# Patient Record
Sex: FEMALE | Race: WHITE | NOT HISPANIC OR LATINO | Employment: OTHER | URBAN - METROPOLITAN AREA
[De-identification: names, ages, dates, MRNs, and addresses within clinical notes are randomized per-mention and may not be internally consistent; named-entity substitution may affect disease eponyms.]

---

## 2019-12-22 ENCOUNTER — APPOINTMENT (OUTPATIENT)
Dept: GENERAL RADIOLOGY | Facility: HOSPITAL | Age: 79
End: 2019-12-22

## 2019-12-22 ENCOUNTER — HOSPITAL ENCOUNTER (OUTPATIENT)
Facility: HOSPITAL | Age: 79
Discharge: HOME HEALTH | End: 2019-12-26
Attending: EMERGENCY MEDICINE | Admitting: EMERGENCY MEDICINE

## 2019-12-22 DIAGNOSIS — S32.110A: Primary | ICD-10-CM
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
## 2019-12-22 PROBLEM — G30.1 DEMENTIA OF THE ALZHEIMER'S TYPE WITH LATE ONSET WITHOUT BEHAVIORAL DISTURBANCE (HCC): Chronic | Status: ACTIVE | Noted: 2019-12-22

## 2019-12-22 PROBLEM — F32.A DEPRESSION: Chronic | Status: ACTIVE | Noted: 2019-12-22

## 2019-12-22 PROBLEM — M19.90 OSTEOARTHRITIS: Chronic | Status: ACTIVE | Noted: 2019-12-22

## 2019-12-22 PROBLEM — F02.80 DEMENTIA OF THE ALZHEIMER'S TYPE WITH LATE ONSET WITHOUT BEHAVIORAL DISTURBANCE (HCC): Chronic | Status: ACTIVE | Noted: 2019-12-22

## 2019-12-22 PROBLEM — E78.5 HYPERLIPIDEMIA: Status: ACTIVE | Noted: 2019-12-22

## 2019-12-22 PROBLEM — E78.5 HYPERLIPIDEMIA: Chronic | Status: ACTIVE | Noted: 2019-12-22

## 2019-12-22 LAB
ANION GAP SERPL CALCULATED.3IONS-SCNC: 13 MMOL/L (ref 5–15)
BASOPHILS # BLD AUTO: 0 10*3/MM3 (ref 0–0.2)
BASOPHILS NFR BLD AUTO: 0.3 % (ref 0–1.5)
BUN BLD-MCNC: 21 MG/DL (ref 8–23)
BUN/CREAT SERPL: 22.6 (ref 7–25)
CALCIUM SPEC-SCNC: 9.5 MG/DL (ref 8.6–10.5)
CHLORIDE SERPL-SCNC: 104 MMOL/L (ref 98–107)
CO2 SERPL-SCNC: 24 MMOL/L (ref 22–29)
CREAT BLD-MCNC: 0.93 MG/DL (ref 0.57–1)
DEPRECATED RDW RBC AUTO: 46.4 FL (ref 37–54)
EOSINOPHIL # BLD AUTO: 0.1 10*3/MM3 (ref 0–0.4)
EOSINOPHIL NFR BLD AUTO: 1.2 % (ref 0.3–6.2)
ERYTHROCYTE [DISTWIDTH] IN BLOOD BY AUTOMATED COUNT: 14.6 % (ref 12.3–15.4)
GFR SERPL CREATININE-BSD FRML MDRD: 58 ML/MIN/1.73
GLUCOSE BLD-MCNC: 99 MG/DL (ref 65–99)
HCT VFR BLD AUTO: 39.7 % (ref 34–46.6)
HGB BLD-MCNC: 13.2 G/DL (ref 12–15.9)
LYMPHOCYTES # BLD AUTO: 1.1 10*3/MM3 (ref 0.7–3.1)
LYMPHOCYTES NFR BLD AUTO: 10.9 % (ref 19.6–45.3)
MCH RBC QN AUTO: 30.1 PG (ref 26.6–33)
MCHC RBC AUTO-ENTMCNC: 33.4 G/DL (ref 31.5–35.7)
MCV RBC AUTO: 90.2 FL (ref 79–97)
MONOCYTES # BLD AUTO: 0.5 10*3/MM3 (ref 0.1–0.9)
MONOCYTES NFR BLD AUTO: 4.7 % (ref 5–12)
NEUTROPHILS # BLD AUTO: 8.2 10*3/MM3 (ref 1.7–7)
NEUTROPHILS NFR BLD AUTO: 82.9 % (ref 42.7–76)
NRBC BLD AUTO-RTO: 0 /100 WBC (ref 0–0.2)
PLATELET # BLD AUTO: 165 10*3/MM3 (ref 140–450)
PMV BLD AUTO: 8.9 FL (ref 6–12)
POTASSIUM BLD-SCNC: 3.5 MMOL/L (ref 3.5–5.2)
RBC # BLD AUTO: 4.39 10*6/MM3 (ref 3.77–5.28)
SODIUM BLD-SCNC: 141 MMOL/L (ref 136–145)
WBC NRBC COR # BLD: 9.9 10*3/MM3 (ref 3.4–10.8)

## 2019-12-22 PROCEDURE — 25010000002 ONDANSETRON PER 1 MG: Performed by: NURSE PRACTITIONER

## 2019-12-22 PROCEDURE — 72170 X-RAY EXAM OF PELVIS: CPT

## 2019-12-22 PROCEDURE — 80048 BASIC METABOLIC PNL TOTAL CA: CPT | Performed by: EMERGENCY MEDICINE

## 2019-12-22 PROCEDURE — 25010000002 KETOROLAC TROMETHAMINE PER 15 MG: Performed by: NURSE PRACTITIONER

## 2019-12-22 PROCEDURE — G0378 HOSPITAL OBSERVATION PER HR: HCPCS

## 2019-12-22 PROCEDURE — 96375 TX/PRO/DX INJ NEW DRUG ADDON: CPT

## 2019-12-22 PROCEDURE — 85025 COMPLETE CBC W/AUTO DIFF WBC: CPT | Performed by: EMERGENCY MEDICINE

## 2019-12-22 PROCEDURE — 96374 THER/PROPH/DIAG INJ IV PUSH: CPT

## 2019-12-22 PROCEDURE — 99283 EMERGENCY DEPT VISIT LOW MDM: CPT

## 2019-12-22 PROCEDURE — 25010000002 HYDRALAZINE PER 20 MG: Performed by: NURSE PRACTITIONER

## 2019-12-22 PROCEDURE — 99219 PR INITIAL OBSERVATION CARE/DAY 50 MINUTES: CPT | Performed by: NURSE PRACTITIONER

## 2019-12-22 RX ORDER — ACETAMINOPHEN 325 MG/1
650 TABLET ORAL EVERY 4 HOURS PRN
Status: DISCONTINUED | OUTPATIENT
Start: 2019-12-22 | End: 2019-12-26 | Stop reason: HOSPADM

## 2019-12-22 RX ORDER — ONDANSETRON 4 MG/1
4 TABLET, FILM COATED ORAL EVERY 6 HOURS PRN
Status: DISCONTINUED | OUTPATIENT
Start: 2019-12-22 | End: 2019-12-26 | Stop reason: HOSPADM

## 2019-12-22 RX ORDER — ALUMINA, MAGNESIA, AND SIMETHICONE 2400; 2400; 240 MG/30ML; MG/30ML; MG/30ML
15 SUSPENSION ORAL EVERY 6 HOURS PRN
Status: DISCONTINUED | OUTPATIENT
Start: 2019-12-22 | End: 2019-12-26 | Stop reason: HOSPADM

## 2019-12-22 RX ORDER — PRAVASTATIN SODIUM 40 MG
80 TABLET ORAL DAILY
COMMUNITY
End: 2019-12-26 | Stop reason: HOSPADM

## 2019-12-22 RX ORDER — MELOXICAM 15 MG/1
15 TABLET ORAL DAILY
COMMUNITY

## 2019-12-22 RX ORDER — CHOLECALCIFEROL (VITAMIN D3) 125 MCG
5 CAPSULE ORAL NIGHTLY PRN
Status: DISCONTINUED | OUTPATIENT
Start: 2019-12-22 | End: 2019-12-26 | Stop reason: HOSPADM

## 2019-12-22 RX ORDER — KETOROLAC TROMETHAMINE 15 MG/ML
15 INJECTION, SOLUTION INTRAMUSCULAR; INTRAVENOUS ONCE
Status: COMPLETED | OUTPATIENT
Start: 2019-12-22 | End: 2019-12-22

## 2019-12-22 RX ORDER — ATORVASTATIN CALCIUM 20 MG/1
20 TABLET, FILM COATED ORAL DAILY
Status: DISCONTINUED | OUTPATIENT
Start: 2019-12-23 | End: 2019-12-26 | Stop reason: HOSPADM

## 2019-12-22 RX ORDER — ACETAMINOPHEN 650 MG/1
650 SUPPOSITORY RECTAL EVERY 4 HOURS PRN
Status: DISCONTINUED | OUTPATIENT
Start: 2019-12-22 | End: 2019-12-26 | Stop reason: HOSPADM

## 2019-12-22 RX ORDER — CITALOPRAM 20 MG/1
20 TABLET ORAL DAILY
COMMUNITY

## 2019-12-22 RX ORDER — AMLODIPINE BESYLATE 2.5 MG/1
2.5 TABLET ORAL DAILY
COMMUNITY
End: 2020-05-06 | Stop reason: HOSPADM

## 2019-12-22 RX ORDER — SODIUM CHLORIDE 0.9 % (FLUSH) 0.9 %
10 SYRINGE (ML) INJECTION AS NEEDED
Status: DISCONTINUED | OUTPATIENT
Start: 2019-12-22 | End: 2019-12-26 | Stop reason: HOSPADM

## 2019-12-22 RX ORDER — DONEPEZIL HYDROCHLORIDE 5 MG/1
10 TABLET, FILM COATED ORAL NIGHTLY
Status: DISCONTINUED | OUTPATIENT
Start: 2019-12-22 | End: 2019-12-26 | Stop reason: HOSPADM

## 2019-12-22 RX ORDER — CITALOPRAM 20 MG/1
20 TABLET ORAL DAILY
Status: DISCONTINUED | OUTPATIENT
Start: 2019-12-22 | End: 2019-12-26 | Stop reason: HOSPADM

## 2019-12-22 RX ORDER — SODIUM CHLORIDE 0.9 % (FLUSH) 0.9 %
10 SYRINGE (ML) INJECTION EVERY 12 HOURS SCHEDULED
Status: DISCONTINUED | OUTPATIENT
Start: 2019-12-22 | End: 2019-12-26 | Stop reason: HOSPADM

## 2019-12-22 RX ORDER — HYDRALAZINE HYDROCHLORIDE 20 MG/ML
10 INJECTION INTRAMUSCULAR; INTRAVENOUS ONCE AS NEEDED
Status: COMPLETED | OUTPATIENT
Start: 2019-12-22 | End: 2019-12-22

## 2019-12-22 RX ORDER — ONDANSETRON 2 MG/ML
4 INJECTION INTRAMUSCULAR; INTRAVENOUS EVERY 6 HOURS PRN
Status: DISCONTINUED | OUTPATIENT
Start: 2019-12-22 | End: 2019-12-26 | Stop reason: HOSPADM

## 2019-12-22 RX ORDER — BISACODYL 5 MG/1
5 TABLET, DELAYED RELEASE ORAL DAILY PRN
Status: DISCONTINUED | OUTPATIENT
Start: 2019-12-22 | End: 2019-12-26 | Stop reason: HOSPADM

## 2019-12-22 RX ORDER — PANTOPRAZOLE SODIUM 40 MG/1
40 TABLET, DELAYED RELEASE ORAL EVERY MORNING
Status: DISCONTINUED | OUTPATIENT
Start: 2019-12-23 | End: 2019-12-26 | Stop reason: HOSPADM

## 2019-12-22 RX ORDER — BENAZEPRIL HYDROCHLORIDE 40 MG/1
40 TABLET, FILM COATED ORAL DAILY
COMMUNITY
End: 2020-05-06 | Stop reason: HOSPADM

## 2019-12-22 RX ORDER — AMLODIPINE BESYLATE 5 MG/1
2.5 TABLET ORAL DAILY
Status: DISCONTINUED | OUTPATIENT
Start: 2019-12-23 | End: 2019-12-26 | Stop reason: HOSPADM

## 2019-12-22 RX ORDER — OMEPRAZOLE 20 MG/1
20 CAPSULE, DELAYED RELEASE ORAL DAILY
COMMUNITY

## 2019-12-22 RX ORDER — LISINOPRIL 20 MG/1
40 TABLET ORAL
Status: DISCONTINUED | OUTPATIENT
Start: 2019-12-23 | End: 2019-12-26 | Stop reason: HOSPADM

## 2019-12-22 RX ORDER — ACETAMINOPHEN 160 MG/5ML
650 SOLUTION ORAL EVERY 4 HOURS PRN
Status: DISCONTINUED | OUTPATIENT
Start: 2019-12-22 | End: 2019-12-26 | Stop reason: HOSPADM

## 2019-12-22 RX ORDER — MELOXICAM 15 MG/1
15 TABLET ORAL DAILY
Status: DISCONTINUED | OUTPATIENT
Start: 2019-12-23 | End: 2019-12-26 | Stop reason: HOSPADM

## 2019-12-22 RX ORDER — DONEPEZIL HYDROCHLORIDE 10 MG/1
10 TABLET, FILM COATED ORAL NIGHTLY
COMMUNITY

## 2019-12-22 RX ADMIN — HYDRALAZINE HYDROCHLORIDE 10 MG: 20 INJECTION INTRAMUSCULAR; INTRAVENOUS at 23:46

## 2019-12-22 RX ADMIN — ACETAMINOPHEN 325 MG: 325 TABLET ORAL at 21:40

## 2019-12-22 RX ADMIN — KETOROLAC TROMETHAMINE 15 MG: 15 INJECTION, SOLUTION INTRAMUSCULAR; INTRAVENOUS at 22:23

## 2019-12-22 RX ADMIN — Medication 10 ML: at 21:56

## 2019-12-22 RX ADMIN — ONDANSETRON 4 MG: 2 INJECTION INTRAMUSCULAR; INTRAVENOUS at 22:16

## 2019-12-22 NOTE — ED PROVIDER NOTES
Subjective   Patient is a 79-year-old female with severe dementia who apparently had wandered from her  while they were at a hotel.  The patient apparently had walked away and fallen per security when they found her.   states she has been unable to walk and bear weight.  The patient is unable to offer complaints due to her severe dementia.          Review of Systems  According to the  negative for recent fever cough congestion vomiting diarrhea or other complaint of injury.  Further review of systems are unable to be obtained due to the patient's dementia.  No past medical history on file.    No Known Allergies    No past surgical history on file.    No family history on file.    Social History     Socioeconomic History   • Marital status:      Spouse name: Not on file   • Number of children: Not on file   • Years of education: Not on file   • Highest education level: Not on file           Objective   Physical Exam  HEENT exam has no point tenderness.  TMs are clear.  Oropharynx clear.  Neck has no test.  Lungs are clear.  Chest nontender.  M soft nontender.  Back has no spinous tenderness.  Extremity exam shows the patient have no point tenderness.  She does have mild pain on palpation of motion of both legs.  Although is difficult to pinpoint position.  Attempts were made to stand the patient however she was unable to bear weight due to what appeared to be pain but very difficult to evaluate.  Procedures           ED Course      Xr Pelvis 1 Or 2 View    Result Date: 12/22/2019  1. Subtle buckling of the right S2 sacral arcuate line which may represent a nondisplaced fracture. Correlate clinically with history of trauma to the sacrum. 2. No evidence of hip fracture or dislocation.  Electronically Signed By-Denys Reid On:12/22/2019 5:29 PM This report was finalized on [id number] by  Denys Reid, .                    No data recorded                        MDM  Number of  Diagnoses or Management Options  Diagnosis management comments: Patient has findings consistent with a nondisplaced sacral fracture.  There is no evidence of other injury at this time.  Patient will be brought in the hospital for pain control and further evaluation for possible rehab as well.    Risk of Complications, Morbidity, and/or Mortality  Presenting problems: high  Diagnostic procedures: high  Management options: high    Patient Progress  Patient progress: stable      Final diagnoses:   Nondisplaced zone i fracture of sacrum, initial encounter for closed fracture (CMS/[facility])   Fall, initial encounter              Bradford Niño MD  12/22/19 6932

## 2019-12-23 ENCOUNTER — APPOINTMENT (OUTPATIENT)
Dept: GENERAL RADIOLOGY | Facility: HOSPITAL | Age: 79
End: 2019-12-23

## 2019-12-23 LAB
ANION GAP SERPL CALCULATED.3IONS-SCNC: 15 MMOL/L (ref 5–15)
BASOPHILS # BLD AUTO: 0 10*3/MM3 (ref 0–0.2)
BASOPHILS NFR BLD AUTO: 0.2 % (ref 0–1.5)
BUN BLD-MCNC: 18 MG/DL (ref 8–23)
BUN/CREAT SERPL: 22.8 (ref 7–25)
CALCIUM SPEC-SCNC: 9.1 MG/DL (ref 8.6–10.5)
CHLORIDE SERPL-SCNC: 108 MMOL/L (ref 98–107)
CO2 SERPL-SCNC: 18 MMOL/L (ref 22–29)
CREAT BLD-MCNC: 0.79 MG/DL (ref 0.57–1)
DEPRECATED RDW RBC AUTO: 46.8 FL (ref 37–54)
EOSINOPHIL # BLD AUTO: 0.1 10*3/MM3 (ref 0–0.4)
EOSINOPHIL NFR BLD AUTO: 0.9 % (ref 0.3–6.2)
ERYTHROCYTE [DISTWIDTH] IN BLOOD BY AUTOMATED COUNT: 14.9 % (ref 12.3–15.4)
GFR SERPL CREATININE-BSD FRML MDRD: 70 ML/MIN/1.73
GLUCOSE BLD-MCNC: 119 MG/DL (ref 65–99)
HCT VFR BLD AUTO: 40 % (ref 34–46.6)
HGB BLD-MCNC: 13.3 G/DL (ref 12–15.9)
LYMPHOCYTES # BLD AUTO: 1.2 10*3/MM3 (ref 0.7–3.1)
LYMPHOCYTES NFR BLD AUTO: 9.6 % (ref 19.6–45.3)
MCH RBC QN AUTO: 29.8 PG (ref 26.6–33)
MCHC RBC AUTO-ENTMCNC: 33.3 G/DL (ref 31.5–35.7)
MCV RBC AUTO: 89.5 FL (ref 79–97)
MONOCYTES # BLD AUTO: 0.6 10*3/MM3 (ref 0.1–0.9)
MONOCYTES NFR BLD AUTO: 4.8 % (ref 5–12)
NEUTROPHILS # BLD AUTO: 10.9 10*3/MM3 (ref 1.7–7)
NEUTROPHILS NFR BLD AUTO: 84.5 % (ref 42.7–76)
NRBC BLD AUTO-RTO: 0 /100 WBC (ref 0–0.2)
PLATELET # BLD AUTO: 176 10*3/MM3 (ref 140–450)
PMV BLD AUTO: 9.3 FL (ref 6–12)
POTASSIUM BLD-SCNC: 3 MMOL/L (ref 3.5–5.2)
RBC # BLD AUTO: 4.46 10*6/MM3 (ref 3.77–5.28)
SODIUM BLD-SCNC: 141 MMOL/L (ref 136–145)
WBC NRBC COR # BLD: 12.9 10*3/MM3 (ref 3.4–10.8)

## 2019-12-23 PROCEDURE — 85025 COMPLETE CBC W/AUTO DIFF WBC: CPT | Performed by: NURSE PRACTITIONER

## 2019-12-23 PROCEDURE — 97535 SELF CARE MNGMENT TRAINING: CPT

## 2019-12-23 PROCEDURE — G0378 HOSPITAL OBSERVATION PER HR: HCPCS

## 2019-12-23 PROCEDURE — 73590 X-RAY EXAM OF LOWER LEG: CPT

## 2019-12-23 PROCEDURE — 97530 THERAPEUTIC ACTIVITIES: CPT

## 2019-12-23 PROCEDURE — 99225 PR SBSQ OBSERVATION CARE/DAY 25 MINUTES: CPT | Performed by: HOSPITALIST

## 2019-12-23 PROCEDURE — 73630 X-RAY EXAM OF FOOT: CPT

## 2019-12-23 PROCEDURE — 80048 BASIC METABOLIC PNL TOTAL CA: CPT | Performed by: NURSE PRACTITIONER

## 2019-12-23 PROCEDURE — 71111 X-RAY EXAM RIBS/CHEST4/> VWS: CPT

## 2019-12-23 PROCEDURE — 97165 OT EVAL LOW COMPLEX 30 MIN: CPT

## 2019-12-23 PROCEDURE — 97162 PT EVAL MOD COMPLEX 30 MIN: CPT

## 2019-12-23 RX ORDER — POTASSIUM CHLORIDE 7.45 MG/ML
10 INJECTION INTRAVENOUS
Status: DISCONTINUED | OUTPATIENT
Start: 2019-12-23 | End: 2019-12-26 | Stop reason: HOSPADM

## 2019-12-23 RX ORDER — POTASSIUM CHLORIDE 20 MEQ/1
40 TABLET, EXTENDED RELEASE ORAL AS NEEDED
Status: DISCONTINUED | OUTPATIENT
Start: 2019-12-23 | End: 2019-12-26 | Stop reason: HOSPADM

## 2019-12-23 RX ORDER — POTASSIUM CHLORIDE 1.5 G/1.77G
40 POWDER, FOR SOLUTION ORAL AS NEEDED
Status: DISCONTINUED | OUTPATIENT
Start: 2019-12-23 | End: 2019-12-26 | Stop reason: HOSPADM

## 2019-12-23 RX ADMIN — MELATONIN TAB 5 MG 5 MG: 5 TAB at 20:58

## 2019-12-23 RX ADMIN — AMLODIPINE BESYLATE 2.5 MG: 5 TABLET ORAL at 09:47

## 2019-12-23 RX ADMIN — POTASSIUM CHLORIDE 40 MEQ: 1500 TABLET, EXTENDED RELEASE ORAL at 09:47

## 2019-12-23 RX ADMIN — Medication 10 ML: at 09:51

## 2019-12-23 RX ADMIN — MELOXICAM 15 MG: 15 TABLET ORAL at 09:47

## 2019-12-23 RX ADMIN — PANTOPRAZOLE SODIUM 40 MG: 40 TABLET, DELAYED RELEASE ORAL at 09:47

## 2019-12-23 RX ADMIN — DONEPEZIL HYDROCHLORIDE 10 MG: 5 TABLET, FILM COATED ORAL at 20:58

## 2019-12-23 RX ADMIN — LISINOPRIL 40 MG: 20 TABLET ORAL at 09:58

## 2019-12-23 RX ADMIN — OYSTER SHELL CALCIUM WITH VITAMIN D 2 TABLET: 500; 200 TABLET, FILM COATED ORAL at 09:47

## 2019-12-23 RX ADMIN — ATORVASTATIN CALCIUM 20 MG: 20 TABLET, FILM COATED ORAL at 09:47

## 2019-12-23 RX ADMIN — CITALOPRAM HYDROBROMIDE 20 MG: 20 TABLET ORAL at 09:47

## 2019-12-23 RX ADMIN — Medication 10 ML: at 21:00

## 2019-12-23 NOTE — NURSING NOTE
Spoke with  about the patient's family's concerns. The family stated that the patient didn't seem like she was at her baseline cognitively. The patient was struggling to tell the RN her name. The patient has also been complaining about her right foot. Dr. Todd said she would see the patient and put in orders

## 2019-12-23 NOTE — THERAPY EVALUATION
Acute Care - Occupational Therapy Initial Evaluation   Denis     Patient Name: Taylor Price  : 1940  MRN: [id number]  Today's Date: 2019             Admit Date: 2019       ICD-10-CM ICD-9-CM   1. Nondisplaced zone i fracture of sacrum, initial encounter for closed fracture (CMS/HCC) S32.110A 805.6   2. Fall, initial encounter W19.XXXA E888.9     Patient Active Problem List   Diagnosis   • Nondisplaced zone i fracture of sacrum, initial encounter for closed fracture (CMS/HCC)   • Hyperlipidemia   • Hypertension   • Dementia of the Alzheimer's type with late onset without behavioral disturbance (CMS/[facility])   • Osteoarthritis   • Depression     Past Medical History:   Diagnosis Date   • Alzheimer disease (CMS/[facility])    • Arthritis    • Depression    • GERD (gastroesophageal reflux disease)    • Hypertension      Past Surgical History:   Procedure Laterality Date   • HYSTERECTOMY            OT ASSESSMENT FLOWSHEET (last 12 hours)      Occupational Therapy Evaluation     Row Name 19 1247                   OT Evaluation Time/Intention    Subjective Information  no complaints  -SR        Document Type  evaluation  -SR        Mode of Treatment  individual therapy;occupational therapy  -SR        Comment  Pt lives at home with . Due to dementia she requires assist for all dressing, bathing, and mobility. She is able to walk with family using HHA.   -SR           General Information    Pertinent History of Current Functional Problem  Pt admitted for fall iwth don-displaced sacral fracture and R foot pain (x-ray (-)).   -SR        Existing Precautions/Restrictions  fall  -SR           Relationship/Environment    Lives With  spouse  -SR        Family Caregiver if Needed  child(tamar), adult;spouse  -SR           Resource/Environmental Concerns    Current Living Arrangements  home/apartment/condo  -SR           Cognitive Assessment/Intervention- PT/OT    Orientation Status (Cognition)  disoriented  to;person;place;situation;time  -SR           Safety Issues, Functional Mobility    Impairments Affecting Function (Mobility)  balance;cognition;pain  -SR           Mobility Assessment/Treatment    Right Lower Extremity (Weight-bearing Status)  weight-bearing as tolerated (WBAT)  -SR           Bed Mobility Assessment/Treatment    Bed Mobility Assessment/Treatment  supine-sit  -SR        Supine-Sit Calvert (Bed Mobility)  moderate assist (50% patient effort)  -SR           Transfer Assessment/Treatment    Transfer Assessment/Treatment  bed-chair transfer  -SR        Comment (Transfers)  cues for stepping and cues for motor planning to sit.    -SR           Bed-Chair Transfer    Bed-Chair Calvert (Transfers)  moderate assist (50% patient effort)  -SR        Assistive Device (Bed-Chair Transfers)  walker, front-wheeled  -SR           ADL Assessment/Intervention    BADL Assessment/Intervention  toileting  -SR           Toileting Assessment/Training    Calvert Level (Toileting)  moderate assist (50% patient effort)  -SR        Comment (Toileting)  Pt required assist for abiodun hygiene, though was able to pull undergarmet over hips with assist for standing.   -SR           BADL Safety/Performance    Impairments, BADL Safety/Performance  balance;cognition;strength  -SR           General ROM    GENERAL ROM COMMENTS  BUE WFL   -SR           MMT (Manual Muscle Testing)    General MMT Comments  BUE 4-/5  -SR           Static Standing Balance    Level of Calvert (Supported Standing, Static Balance)  maximal assist, 25 to 49% patient effort  -SR        Time Able to Maintain Position (Supported Standing, Static Balance)  2 to 3 minutes  -SR           Positioning and Restraints    Pre-Treatment Position  in bed  -SR        Post Treatment Position  chair  -SR        In Chair  exit alarm on;with family/caregiver;encouraged to call for assist;call light within reach  -SR           Pain Scale: FACES  Pre/Post-Treatment    Pain: FACES Scale, Pretreatment  2-->hurts little bit  -SR        Pain: FACES Scale, Post-Treatment  4-->hurts little more  -SR           Clinical Impression (OT)    Criteria for Skilled Therapeutic Interventions Met (OT Eval)  yes;treatment indicated  -SR        Rehab Potential (OT Eval)  good, to achieve stated therapy goals  -SR        Therapy Frequency (OT Eval)  3 times/wk  -SR        Predicted Duration of Therapy Intervention (Therapy Eval)  Until discharge   -SR        Anticipated Discharge Disposition (OT)  inpatient rehabilitation facility  -SR           Planned OT Interventions    Planned Therapy Interventions (OT Eval)  adaptive equipment training;BADL retraining;cognitive/visual perception retraining;functional balance retraining;occupation/activity based interventions;transfer/mobility retraining;strengthening exercise  -SR           OT Goals    Bed Mobility Goal Selection (OT)  bed mobility, OT goal 1  -SR        Transfer Goal Selection (OT)  transfer, OT goal 1  -SR        Toileting Goal Selection (OT)  toileting, OT goal 1  -SR           Bed Mobility Goal 1 (OT)    Activity/Assistive Device (Bed Mobility Goal 1, OT)  bed mobility activities, all  -SR        Redwood Level/Cues Needed (Bed Mobility Goal 1, OT)  minimum assist (75% or more patient effort)  -SR        Time Frame (Bed Mobility Goal 1, OT)  2 weeks  -SR           Transfer Goal 1 (OT)    Activity/Assistive Device (Transfer Goal 1, OT)  bed-to-chair/chair-to-bed  -SR        Redwood Level/Cues Needed (Transfer Goal 1, OT)  minimum assist (75% or more patient effort)  -SR        Time Frame (Transfer Goal 1, OT)  2 weeks  -SR           Toileting Goal 1 (OT)    Activity/Device (Toileting Goal 1, OT)  toileting skills, all  -SR        Redwood Level/Cues Needed (Toileting Goal 1, OT)  minimum assist (75% or more patient effort)  -SR        Time Frame (Toileting Goal 1, OT)  2 weeks  -SR          User Key  (r) =  Recorded By, (t) = Taken By, (c) = Cosigned By    Initials Name Effective Dates    SR Debby Burgos OT 03/01/19 -                OT Recommendation and Plan  Outcome Summary/Treatment Plan (OT)  Anticipated Discharge Disposition (OT): inpatient rehabilitation facility  Planned Therapy Interventions (OT Eval): adaptive equipment training, BADL retraining, cognitive/visual perception retraining, functional balance retraining, occupation/activity based interventions, transfer/mobility retraining, strengthening exercise  Therapy Frequency (OT Eval): 3 times/wk  Outcome Summary: Pt admitted s/p fall with fracture to sacrum, though (-) fracture in RLE.  Pt has impaired cognition at baseline due to dementia and  assists with all ADLs and mobility. She requires max cues for motor planning and assist for weight shifting with transfers. She is not safe to return home and will reuqire IP rehab at discharge.        Time Calculation:   Time Calculation- OT     Row Name 12/23/19 1610             Time Calculation- OT    OT Start Time  1247  -SR      OT Stop Time  1324  -SR      OT Time Calculation (min)  37 min  -SR      Total Timed Code Minutes- OT  20 minute(s)  -SR      OT Non-Billable Time (min)  17 min  -SR      OT Received On  12/23/19  -SR      OT - Next Appointment  12/24/19  -SR      OT Goal Re-Cert Due Date  01/06/20  -        User Key  (r) = Recorded By, (t) = Taken By, (c) = Cosigned By    Initials Name Provider Type    SR Debby Burgos OT Occupational Therapist        Therapy Charges for Today     Code Description Service Date Service Provider Modifiers Qty    40116540687 HC OT EVAL LOW COMPLEXITY 3 12/23/2019 Debby Burgos OT GO 1    75134765665 HC OT SELF CARE/MGMT/TRAIN EA 15 MIN 12/23/2019 Debby Burgos OT GO 1    40176206461 HC OT THERAPEUTIC ACT EA 15 MIN 12/23/2019 Debby Burgos OT GO 1               Debby Burgos OT  12/23/2019

## 2019-12-23 NOTE — PLAN OF CARE
Problem: Patient Care Overview  Goal: Plan of Care Review  Flowsheets (Taken 12/23/2019 2215)  Outcome Summary: 78 y/o F admitted following a fall in hotel that she was staying in with her . Per imaging, pt with sacrum fracture. (-) for R LE fracture however pts gait is analgic when weight bearing through R LE. Patientwith PMH dementia which spouse reports cognition is worse since fall. He typically assists with ADLs but pt is able to ambulate unassisted. Pt currently disoriented x 4. Requiring max A for 10' ambulation with HHA- will attempt ambulation with RW next session. (limited by pt leaving for xray). Patient will benefit from IP rehab as she has experienced a significant decline from her functional baseline. Will follow 3x/week.

## 2019-12-23 NOTE — PROGRESS NOTES
Discharge Planning Assessment   Denis     Patient Name: Taylor Price  MRN: [id number]  Today's Date: 12/23/2019    Admit Date: 12/22/2019    Discharge Needs Assessment     Row Name 12/23/19 1434       Living Environment    Lives With  spouse    Current Living Arrangements  home/apartment/condo    Primary Care Provided by  spouse/significant other    Provides Primary Care For  no one, unable/limited ability to care for self    Family Caregiver if Needed  spouse;child(tamar), adult    Quality of Family Relationships  helpful    Able to Return to Prior Arrangements  yes       Resource/Environmental Concerns    Resource/Environmental Concerns  none    Transportation Concerns  car, none       Transition Planning    Patient/Family Anticipates Transition to  inpatient rehabilitation facility;home with help/services    Patient/Family Anticipated Services at Transition  home health care;skilled nursing;rehabilitation services    Transportation Anticipated  family or friend will provide;health plan transportation       Discharge Needs Assessment    Readmission Within the Last 30 Days  no previous admission in last 30 days    Concerns to be Addressed  discharge planning    Equipment Currently Used at Home  wheelchair    Outpatient/Agency/Support Group Needs  inpatient rehabilitation facility;skilled nursing facility    Discharge Facility/Level of Care Needs  nursing facility, skilled    Provided post acute provider list?  Yes    Post Acute Provider Lists  Home Health;Inpatient Rehab;Nursing Home    Post Acuite Provider List  Delivered    Delivered To  Support Person    Support Person  spouse and daughter    Method of Delivery  In person    Current Discharge Risk  cognitively impaired;dependent with mobility/activities of daily living    Discharge Coordination/Progress  PT and OT ordered fpr patient . Spoke to family at bedside. They are agreeable to inpatient rehab and gave choices of [facility], [facility], and [facility]  "3. Referrals given to [facility]. Pending acceptance and will need precert        Discharge Plan     Row Name 12/23/19 1438       Plan    Plan  [facility], [facility] and [facility]. referrals made and pending acceptance and precert and pas[facility]    Patient/Family in Agreement with Plan  yes    Plan Comments  see assessemtn notes        Destination      Service Provider Request Status Selected Services Address Phone Number Fax Number    [facility] Pending - Request Sent N/A [address] [facility] [address] [phone]       Demographic Summary     Row Name 12/23/19 1431       General Information    Admission Type  observation Text to UR nurse Nereida to send to EHR     Arrived From  emergency department    Referral Source  admission list    Reason for Consult  discharge planning    Preferred Language  English        Functional Status     Row Name 12/23/19 1432       Functional Status    Usual Activity Tolerance  fair    Current Activity Tolerance  poor    Functional Status Comments  Patients spouse states he gets her up and dresses her and does all her care       Functional Status, IADL    Medications  completely dependent    Meal Preparation  completely dependent    Housekeeping  completely dependent    Laundry  completely dependent    Shopping  completely dependent       Mental Status    General Appearance WDL  WDL       Mental Status Summary    Recent Changes in Mental Status/Cognitive Functioning  ability to speak clearly    Mental Status Comments  Patients daughter states she knows patient has dementia but she is \"far below her baseline with her speaking and seems like she cant get words out. \" Bedside nurse informed and she notified md. Lor Fernández, RN    "

## 2019-12-23 NOTE — DISCHARGE PLACEMENT REQUEST
"Viraj Price (79 y.o. Female)     Date of Birth Social Security Number Address Home Phone MRN    1940  [address] [place] [address] [phone] [id number]    Evangelical Marital Status          Non-Mu-ism        Admission Date Admission Type Admitting Provider Attending Provider Department, Room/Bed    12/22/19 Emergency Joanne Todd MD Nallapuram, Divya, MD [facility] SURGICAL INPATIENT, 4120/1    Discharge Date Discharge Disposition Discharge Destination                       Attending Provider:  Joanne Todd MD    Allergies:  No Known Allergies    Isolation:  None   Infection:  None   Code Status:  CPR    Ht:  149.9 cm (59\")   Wt:  79.6 kg (175 lb 7.8 oz)    Admission Cmt:  None   Principal Problem:  Nondisplaced zone i fracture of sacrum, initial encounter for closed fracture (CMS/[facility]) [S32.110A]                 Active Insurance as of 12/22/2019     Primary Coverage     Payor Plan Insurance Group Employer/Plan Group    HUMANA MEDICARE REPLACEMENT HUMANA MEDICARE REPLACEMENT [id number]     Payor Plan Address Payor Plan Phone Number Payor Plan Fax Number Effective Dates    [address] [address] [phone]  1/1/2018 - None Entered    [facility] [address]       Subscriber Name Subscriber Birth Date Member ID       VIRAJ PRICE 1940 [id number]                 Emergency Contacts      (Rel.) Home Phone Work Phone Mobile Phone    ANDRESSA PRCIE (Spouse) -- -- [phone]            Emergency Contact Information     Name Relation Home Work Mobile    ANDRESSA PRICE Spouse   [phone]          Insurance Information                HUMANA MEDICARE REPLACEMENT/HUMANA MEDICARE REPLACEMENT Phone: [phone]    Subscriber: Viraj Price Subscriber#: [id number]    Group#: [id number] Precert#:           "

## 2019-12-23 NOTE — H&P
[facility] Medicine Services      Patient Name: Taylor Price  : 1940  MRN: [id number]  Primary Care Physician: Camelia Miller APRN  Date of admission: 2019    Patient Care Team:  Camelia Miller APRN as PCP - General          Subjective   History Present Illness     Chief Complaint:   Chief Complaint   Patient presents with   • Fall   • Foot Pain       Ms. Price is a 79 y.o. with a history of Dementia, HTN, hyperlipidemia and depression, presented to the [facility] ED on 2019 with a complaint of fall, with tailbone pain.  states he and the patient were out of town at a hotel, when the patient left their room and was found on the floor by security. Pt is alert to name only. According to her  she was unable to stand and bear weight.  states her functional ability was that she was able to walk before this fall earlier today.     In the ED, pelvis xray: Buckling of the right S2 sacral arcuate line, may represent a non displaced fracture.  WBC 9.9, Hbg 13.2, Hct 39.7, BUN 21, Cr 0.93.       Review of Systems   Unable to perform ROS: dementia           Personal History     Past Medical History:   Past Medical History:   Diagnosis Date   • Alzheimer disease (CMS/[facility])    • Arthritis    • Depression    • GERD (gastroesophageal reflux disease)    • Hypertension        Surgical History:      Past Surgical History:   Procedure Laterality Date   • HYSTERECTOMY             Family History: Family history is unknown by patient. Otherwise pertinent FHx was reviewed and unremarkable.     Social History:  reports that she has never smoked. She has never used smokeless tobacco. She reports that she does not drink alcohol or use drugs.      Medications:  Prior to Admission medications    Medication Sig Start Date End Date Taking? Authorizing Provider   amLODIPine (NORVASC) 2.5 MG tablet Take 2.5 mg by mouth Daily.   Yes Provider, MD Vero    benazepril (LOTENSIN) 20 MG tablet Take 40 mg by mouth Daily.   Yes Vero Martin MD   calcium carb-cholecalciferol (CALCIUM 600+D) 600-800 MG-UNIT tablet Take 1 tablet by mouth Daily.   Yes Vero Martin MD   citalopram (CeleXA) 20 MG tablet Take 20 mg by mouth Daily.   Yes Vero Martin MD   donepezil (ARICEPT) 10 MG tablet Take 10 mg by mouth Every Night.   Yes Vero Martin MD   meloxicam (MOBIC) 15 MG tablet Take 15 mg by mouth Daily.   Yes Vero Martin MD   omeprazole (priLOSEC) 20 MG capsule Take 20 mg by mouth Daily.   Yes Vero Martin MD   pravastatin (PRAVACHOL) 40 MG tablet Take 80 mg by mouth Daily.   Yes Vero Martin MD       Allergies:  No Known Allergies    Objective   Objective     Vital Signs  Temp:  [97.1 °F (36.2 °C)] 97.1 °F (36.2 °C)  Heart Rate:  [59] 59  Resp:  [16] 16  BP: (174)/(63) 174/63  SpO2:  [100 %] 100 %  on   ;      Body mass index is 35.35 kg/m².    Physical Exam   Constitutional: She appears well-developed and well-nourished. No distress.   HENT:   Head: Normocephalic.   Eyes: Pupils are equal, round, and reactive to light.   Neck: Normal range of motion. Neck supple.   Cardiovascular: Normal rate, regular rhythm, normal heart sounds and intact distal pulses.   Pulmonary/Chest: Effort normal and breath sounds normal.   Abdominal: Soft. Bowel sounds are normal.   Musculoskeletal: She exhibits tenderness.   Neurological: She is alert.   Oriented x 1   Skin: Skin is warm and dry.   Vitals reviewed.      Results Review:  I have personally reviewed most recent lab results and radiology images and interpretations and agree with findings.    Results from last 7 days   Lab Units 12/22/19  1827   WBC 10*3/mm3 9.90   HEMOGLOBIN g/dL 13.2   HEMATOCRIT % 39.7   PLATELETS 10*3/mm3 165     Results from last 7 days   Lab Units 12/22/19  1827   SODIUM mmol/L 141   POTASSIUM mmol/L 3.5   CHLORIDE mmol/L 104   CO2 mmol/L 24.0   BUN mg/dL  21   CREATININE mg/dL 0.93   GLUCOSE mg/dL 99   CALCIUM mg/dL 9.5     Estimated Creatinine Clearance: 47.2 mL/min (by C-G formula based on SCr of 0.93 mg/dL).  Brief Urine Lab Results     None          Microbiology Results (last 10 days)     ** No results found for the last 240 hours. **          ECG/EMG Results (most recent)     None                    Xr Pelvis 1 Or 2 View    Result Date: 12/22/2019  1. Subtle buckling of the right S2 sacral arcuate line which may represent a nondisplaced fracture. Correlate clinically with history of trauma to the sacrum. 2. No evidence of hip fracture or dislocation.  Electronically Signed By-Denys Reid On:12/22/2019 5:29 PM This report was finalized on [id number] by  Denys Reid, .        Estimated Creatinine Clearance: 47.2 mL/min (by C-G formula based on SCr of 0.93 mg/dL).    Assessment/Plan   Assessment/Plan       Active Hospital Problems    Diagnosis  POA   • **Nondisplaced zone i fracture of sacrum, initial encounter for closed fracture (CMS/[facility]) [S32.110A]  Yes     Priority: High   • Dementia of the Alzheimer's type with late onset without behavioral disturbance (CMS/HCC) [G30.1, F02.80]  Yes     Priority: Medium   • Osteoarthritis [M19.90]  Yes     Priority: Medium   • Hyperlipidemia [E78.5]  Yes     Priority: Medium   • Depression [F32.9]  Yes     Priority: Medium   • Hypertension [I10]  Yes     Priority: Medium      Resolved Hospital Problems   No resolved problems to display.       Active Hospital Problems:  No notes have been filed under this hospital service.  Service: Hospitalist    Non displaced sacral fracture  -pain medications as needed.  -PT to see and eval  -May need rehab,     Osteoarthritis  -check urine , pt   -continue Mobic    Dementia  -Continue Aricept    HTN  -Continue Norvasc and Lotensin    Hyperlipidemia  -continue statin    Depression  -citalopram      VTE Prophylaxis - SCDs.    CODE STATUS:    Code Status and Medical Interventions:    Ordered at: 12/22/19 2004     Level Of Support Discussed With:    Patient     Code Status:    CPR     Medical Interventions (Level of Support Prior to Arrest):    Full       Admission Status:  I believe this patient meets inpatient criteria.      I discussed the patients findings and my recommendations with patient and family.        Electronically signed by SALAS Stack, 12/22/19, 8:06 PM.  [facility] Hospitalist Team

## 2019-12-23 NOTE — THERAPY EVALUATION
Patient Name: Taylor Price  : 1940    MRN: [id number]                              Today's Date: 2019       Admit Date: 2019    Visit Dx:     ICD-10-CM ICD-9-CM   1. Nondisplaced zone i fracture of sacrum, initial encounter for closed fracture (CMS/[facility]) S32.110A 805.6   2. Fall, initial encounter W19.XXXA E888.9     Patient Active Problem List   Diagnosis   • Nondisplaced zone i fracture of sacrum, initial encounter for closed fracture (CMS/[facility])   • Hyperlipidemia   • Hypertension   • Dementia of the Alzheimer's type with late onset without behavioral disturbance (CMS/[facility])   • Osteoarthritis   • Depression     Past Medical History:   Diagnosis Date   • Alzheimer disease (CMS/[facility])    • Arthritis    • Depression    • GERD (gastroesophageal reflux disease)    • Hypertension      Past Surgical History:   Procedure Laterality Date   • HYSTERECTOMY       General Information     Row Name 19 1413          PT Evaluation Time/Intention    Document Type  evaluation  -     Mode of Treatment  physical therapy  -     Row Name 19 1413          General Information    Patient Profile Reviewed?  yes  -     Prior Level of Function  -- patient lives with her  who assists her with ADLs. She typically ambulates with hand held assist.  assists with bathing, dressing, meals, medications. Pt is somewhat incontinent at baseline per son.  -     Existing Precautions/Restrictions  fall  -     Row Name 19 1413          Relationship/Environment    Lives With  spouse  -     Row Name 19 1413          Resource/Environmental Concerns    Current Living Arrangements  home/apartment/condo  -     Row Name 19 1413          Cognitive Assessment/Intervention- PT/OT    Orientation Status (Cognition)  disoriented to;person;place;situation;time  -     Row Name 19 1413          Safety Issues, Functional Mobility    Impairments Affecting Function (Mobility)   cognition;balance;pain  -       User Key  (r) = Recorded By, (t) = Taken By, (c) = Cosigned By    Initials Name Provider Type    Loren Hopkins PT Physical Therapist        Mobility     Row Name 12/23/19 1415          Bed Mobility Assessment/Treatment    Bed Mobility Assessment/Treatment  sit-supine  -SS     Sit-Supine [place] (Bed Mobility)  moderate assist (50% patient effort)  -     Comment (Bed Mobility)  requires dependent assist for scooting superiorly in bed  -     Row Name 12/23/19 1415          Transfer Assessment/Treatment    Comment (Transfers)  increased time for pt to motor plan come to stand from chair  -     Row Name 12/23/19 1415          Sit-Stand Transfer    Sit-Stand [place] (Transfers)  maximum assist (25% patient effort)  -     Row Name 12/23/19 1415          Gait/Stairs Assessment/Training    [place] Level (Gait)  maximum assist (25% patient effort)  -     Assistive Device (Gait)  -- hand held assist  -     Distance in Feet (Gait)  10'  -     Deviations/Abnormal Patterns (Gait)  antalgic;gait speed decreased  -     Bilateral Gait Deviations  forward flexed posture  -     Comment (Gait/Stairs)  patient with pain with weight bearing through R LE. Patient would benefit from use of RW. Left one in room however did not assess pt with this due to time limited-- patient needing transport to [facility].   -     Row Name 12/23/19 1415          Mobility Assessment/Intervention    Extremity Weight-bearing Status  right lower extremity  -     Right Lower Extremity (Weight-bearing Status)  weight-bearing as tolerated (WBAT)  -       User Key  (r) = Recorded By, (t) = Taken By, (c) = Cosigned By    Initials Name Provider Type    Loren Hopkins PT Physical Therapist        Obj/Interventions     Row Name 12/23/19 1418          General ROM    GENERAL ROM COMMENTS  grossly LE WFL  -     Row Name 12/23/19 1418          MMT (Manual Muscle Testing)    General  MMT Comments  grossly LE 3/5  -     Row Name 12/23/19 1418          Static Standing Balance    Level of [place] (Supported Standing, Static Balance)  maximal assist, 25 to 49% patient effort  -SS     Time Able to Maintain Position (Supported Standing, Static Balance)  1 to 2 minutes  -     Row Name 12/23/19 1418          Sensory Assessment/Intervention    Sensory General Assessment  -- cognition limited ability to assess  -       User Key  (r) = Recorded By, (t) = Taken By, (c) = Cosigned By    Initials Name Provider Type     Loren Mendoza, PT Physical Therapist        Goals/Plan     [facility] Name 12/23/19 1423          Bed Mobility Goal 1 (PT)    Activity/Assistive Device (Bed Mobility Goal 1, PT)  bed mobility activities, all  -SS     [place] Level/Cues Needed (Bed Mobility Goal 1, PT)  minimum assist (75% or more patient effort)  -SS     Time Frame (Bed Mobility Goal 1, PT)  long term goal (LTG);2 weeks  -[facility] Name 12/23/19 1423          Transfer Goal 1 (PT)    Activity/Assistive Device (Transfer Goal 1, PT)  transfers, all  -SS     [place] Level/Cues Needed (Transfer Goal 1, PT)  contact guard assist  -SS     Time Frame (Transfer Goal 1, PT)  long term goal (LTG);2 weeks  -[facility] Name 12/23/19 1423          Gait Training Goal 1 (PT)    Activity/Assistive Device (Gait Training Goal 1, PT)  gait (walking locomotion);walker, rolling  -SS     [place] Level (Gait Training Goal 1, PT)  contact guard assist  -SS     Time Frame (Gait Training Goal 1, PT)  long term goal (LTG);2 weeks  -       User Key  (r) = Recorded By, (t) = Taken By, (c) = Cosigned By    Initials Name Provider Type     Loren Mendoza, PT Physical Therapist        Clinical Impression     Row Name 12/23/19 1419          Pain Assessment    Additional Documentation  Pain Scale: FACES Pre/Post-Treatment (Group)  -[facility] Name 12/23/19 1419          Pain Scale: Numbers Pre/Post-Treatment    Pain Location -  Side  Right  -SS     Pain Location  foot  -SS     Row Name 12/23/19 1419          Pain Scale: FACES Pre/Post-Treatment    Pain: FACES Scale, Pretreatment  2-->hurts little bit  -SS     Pain: FACES Scale, Post-Treatment  6-->hurts even more  -SS     Row Name 12/23/19 1419          Plan of Care Review    Plan of Care Reviewed With  patient;family  -SS     Outcome Summary  80 y/o F admitted following a fall in hotel that she was staying in with her . Per imaging, pt with sacrum fracture. (-) for R LE fracture however pts gait is analgic when weight bearing through R LE. Patientwith PMH dementia which spouse reports cognition is worse since fall. He typically assists with ADLs but pt is able to ambulate unassisted. Pt currently disoriented x 4. Requiring max A for 10' ambulation with HHA- will attempt ambulation with RW next session. (limited by pt leaving for xray). Patient will benefit from IP rehab as she has experienced a significant decline from her functional baseline. Will follow 3x/week.   -     Row Name 12/23/19 1419          Physical Therapy Clinical Impression    Criteria for Skilled Interventions Met (PT Clinical Impression)  yes;treatment indicated  -SS     Rehab Potential (PT Clinical Summary)  good, to achieve stated therapy goals  -SS     Predicted Duration of Therapy (PT)  until d/c  -     Row Name 12/23/19 1419          Positioning and Restraints    Pre-Treatment Position  sitting in chair/recliner  -SS     Post Treatment Position  bed  -SS       User Key  (r) = Recorded By, (t) = Taken By, (c) = Cosigned By    Initials Name Provider Type    Loren Hopkins, PT Physical Therapist        Outcome Measures    No documentation.         PT Recommendation and Plan  Planned Therapy Interventions (PT Eval): balance training, bed mobility training, gait training, home exercise program, patient/family education, strengthening, transfer training  Outcome Summary/Treatment Plan (PT)  Anticipated  Discharge Disposition (PT): inpatient rehabilitation facility  Plan of Care Reviewed With: patient, family  Outcome Summary: 80 y/o F admitted following a fall in hotel that she was staying in with her . Per imaging, pt with sacrum fracture. (-) for R LE fracture however pts gait is analgic when weight bearing through R LE. Patientwith PMH dementia which spouse reports cognition is worse since fall. He typically assists with ADLs but pt is able to ambulate unassisted. Pt currently disoriented x 4. Requiring max A for 10' ambulation with HHA- will attempt ambulation with RW next session. (limited by pt leaving for xray). Patient will benefit from IP rehab as she has experienced a significant decline from her functional baseline. Will follow 3x/week.      Time Calculation:   PT Charges     Row Name 12/23/19 1424             Time Calculation    Start Time  1115  -      Stop Time  1134  -      Time Calculation (min)  19 min  -      PT Received On  12/23/19  -      PT - Next Appointment  12/24/19  -      PT Goal Re-Cert Due Date  01/06/20  -         Time Calculation- PT    Total Timed Code Minutes- PT  0 minute(s)  -        User Key  (r) = Recorded By, (t) = Taken By, (c) = Cosigned By    Initials Name Provider Type     Loren Mendoza, PT Physical Therapist        Therapy Charges for Today     Code Description Service Date Service Provider Modifiers Qty    73412361673  PT EVAL MOD COMPLEXITY 3 12/23/2019 Loren Mendoza PT GP 1               Loren Mendoza PT  12/23/2019

## 2019-12-23 NOTE — PLAN OF CARE
Problem: Patient Care Overview  Goal: Plan of Care Review  Outcome: Ongoing (interventions implemented as appropriate)  Flowsheets (Taken 12/23/2019 1603)  Outcome Summary: Pt admitted s/p fall with fracture to sacrum, though (-) fracture in RLE.  Pt has impaired cognition at baseline due to dementia and  assists with all ADLs and mobility. She requires max cues for motor planning and assist for weight shifting with transfers. She is not safe to return home and will reuqire IP rehab at discharge.

## 2019-12-24 PROCEDURE — 97116 GAIT TRAINING THERAPY: CPT

## 2019-12-24 PROCEDURE — 99224 PR SBSQ OBSERVATION CARE/DAY 15 MINUTES: CPT | Performed by: HOSPITALIST

## 2019-12-24 PROCEDURE — G0378 HOSPITAL OBSERVATION PER HR: HCPCS

## 2019-12-24 PROCEDURE — 97530 THERAPEUTIC ACTIVITIES: CPT

## 2019-12-24 RX ADMIN — AMLODIPINE BESYLATE 2.5 MG: 5 TABLET ORAL at 09:33

## 2019-12-24 RX ADMIN — Medication 10 ML: at 09:35

## 2019-12-24 RX ADMIN — ATORVASTATIN CALCIUM 20 MG: 20 TABLET, FILM COATED ORAL at 09:34

## 2019-12-24 RX ADMIN — ACETAMINOPHEN 650 MG: 325 TABLET ORAL at 18:15

## 2019-12-24 RX ADMIN — DONEPEZIL HYDROCHLORIDE 10 MG: 5 TABLET, FILM COATED ORAL at 20:29

## 2019-12-24 RX ADMIN — PANTOPRAZOLE SODIUM 40 MG: 40 TABLET, DELAYED RELEASE ORAL at 06:01

## 2019-12-24 RX ADMIN — MELOXICAM 15 MG: 15 TABLET ORAL at 09:35

## 2019-12-24 RX ADMIN — ACETAMINOPHEN 650 MG: 325 TABLET ORAL at 13:12

## 2019-12-24 RX ADMIN — Medication 10 ML: at 20:29

## 2019-12-24 RX ADMIN — CITALOPRAM HYDROBROMIDE 20 MG: 20 TABLET ORAL at 09:33

## 2019-12-24 RX ADMIN — LISINOPRIL 40 MG: 20 TABLET ORAL at 09:34

## 2019-12-24 RX ADMIN — OYSTER SHELL CALCIUM WITH VITAMIN D 2 TABLET: 500; 200 TABLET, FILM COATED ORAL at 09:34

## 2019-12-24 NOTE — PROGRESS NOTES
Continued Stay Note   Denis     Patient Name: Taylor Price  MRN: [id number]  Today's Date: 12/24/2019    Admit Date: 12/22/2019    Discharge Plan     Row Name 12/24/19 1417       Plan    Plan  Final d/c plan will bed home with spouse and [facility] rolling walker and bedside commode from [facility]    Row Name 12/24/19 1407       Plan    Plan Comments  SW and RN met with pt and spouse at bedside. Spouse states he wants to take pt home as he is worried about her going to rehab. Spouse requests HH and DME (rolling walker and beside commode)    Row Name 12/24/19 6371       Plan    Plan  D/C Plan : [place] vs [facility] h/[facility] . Rolling wlaker order through [facility].     Plan Comments  Spoke with  today  and he is refusing rehab at this time . The nurse to speek with pt's daughter when she comes in . MWS notified .        Discharge Codes    No documentation.             Carmen Jiménez, RN

## 2019-12-24 NOTE — DISCHARGE PLACEMENT REQUEST
"Viraj Price (79 y.o. Female)     Date of Birth Social Security Number Address Home Phone MRN    1940  [address] [place] [address] [phone] [id number]    Yazdanism Marital Status          Non-Tenriism        Admission Date Admission Type Admitting Provider Attending Provider Department, Room/Bed    12/22/19 Emergency Joanne Todd MD Nallapuram, Divya, MD [facility] SURGICAL INPATIENT, 4120/1    Discharge Date Discharge Disposition Discharge Destination                       Attending Provider:  Joanne Todd MD    Allergies:  No Known Allergies    Isolation:  None   Infection:  None   Code Status:  CPR    Ht:  149.9 cm (59\")   Wt:  79.6 kg (175 lb 7.8 oz)    Admission Cmt:  None   Principal Problem:  Nondisplaced zone i fracture of sacrum, initial encounter for closed fracture (CMS/[facility]) [S32.110A]                 Active Insurance as of 12/22/2019     Primary Coverage     Payor Plan Insurance Group Employer/Plan Group    HUMANA MEDICARE REPLACEMENT HUMANA MEDICARE REPLACEMENT [id number]     Payor Plan Address Payor Plan Phone Number Payor Plan Fax Number Effective Dates    [address] [address] [phone]  1/1/2018 - None Entered    [facility] [address]       Subscriber Name Subscriber Birth Date Member ID       VIRAJ PRICE 1940 [id number]                 Emergency Contacts      (Rel.) Home Phone Work Phone Mobile Phone    ANDRESSA PRICE (Spouse) -- -- [phone]              "

## 2019-12-24 NOTE — THERAPY TREATMENT NOTE
Patient Name: Taylor Price  : 1940    MRN: [id number]                              Today's Date: 2019       Admit Date: 2019    Visit Dx:     ICD-10-CM ICD-9-CM   1. Nondisplaced zone i fracture of sacrum, initial encounter for closed fracture (CMS/[facility]) S32.110A 805.6   2. Fall, initial encounter W19.XXXA E888.9     Patient Active Problem List   Diagnosis   • Nondisplaced zone i fracture of sacrum, initial encounter for closed fracture (CMS/[facility])   • Hyperlipidemia   • Hypertension   • Dementia of the Alzheimer's type with late onset without behavioral disturbance (CMS/[facility])   • Osteoarthritis   • Depression     Past Medical History:   Diagnosis Date   • Alzheimer disease (CMS/[facility])    • Arthritis    • Depression    • GERD (gastroesophageal reflux disease)    • Hypertension      Past Surgical History:   Procedure Laterality Date   • HYSTERECTOMY       General Information     Row Name 19 1516          PT Evaluation Time/Intention    Document Type  progress note/recertification;therapy note (daily note)  -SC     Mode of Treatment  physical therapy  -SC     Row Name 19 1516          Cognitive Assessment/Intervention- PT/OT    Cognitive Assessment/Intervention Comment  pt has dementia and unable to answer orientation questions  -SC     Row Name 19 1516          Safety Issues, Functional Mobility    Safety Issues Affecting Function (Mobility)  ability to follow commands;awareness of need for assistance;insight into deficits/self awareness;judgment;problem solving  -SC     Impairments Affecting Function (Mobility)  cognition;balance;pain  -SC       User Key  (r) = Recorded By, (t) = Taken By, (c) = Cosigned By    Initials Name Provider Type    SC Ilene Garcia PTA Physical Therapy Assistant        Mobility     Row Name 19 1518          Bed Mobility Assessment/Treatment    Bed Mobility Assessment/Treatment  sit-supine  -SC     Supine-Sit [place] (Bed Mobility)  moderate  assist (50% patient effort);1 person assist  -SC     Row Name 12/24/19 1518          Transfer Assessment/Treatment    Comment (Transfers)  sit to stand from eob, from bs comode, from chair 2 times.  pt with painful rle and stops assisting after sit to stand initiating  -SC     Row Name 12/24/19 1518          Bed-Chair Transfer    Bed-Chair [place] (Transfers)  moderate assist (50% patient effort);1 person to manage equipment  -SC     Assistive Device (Bed-Chair Transfers)  walker, front-wheeled  -SC     Row Name 12/24/19 1518          Sit-Stand Transfer    Sit-Stand [place] (Transfers)  moderate assist (50% patient effort);2 person assist sit to stand multiple times.  pt did best with walker anchored and pt pulling up to standing.    -SC     Assistive Device (Sit-Stand Transfers)  walker, front-wheeled  -SC     Row Name 12/24/19 1518          Gait/Stairs Assessment/Training    [place] Level (Gait)  moderate assist (50% patient effort);1 person assist;1 person to manage equipment  -SC     Assistive Device (Gait)  walker, front-wheeled  -SC     Distance in Feet (Gait)  12',  pt wont initiate steps forward due to pain.  walker pushed forward and therapist moving pt forward.  very painful rle with weight bearing  -SC     Deviations/Abnormal Patterns (Gait)  antalgic;gait speed decreased  -SC     Bilateral Gait Deviations  heel strike decreased  -SC     Row Name 12/24/19 1518          Mobility Assessment/Intervention    Extremity Weight-bearing Status  right lower extremity  -SC     Right Lower Extremity (Weight-bearing Status)  weight-bearing as tolerated (WBAT)  -SC       User Key  (r) = Recorded By, (t) = Taken By, (c) = Cosigned By    Initials Name Provider Type    SC Ilene Garcia, PTA Physical Therapy Assistant        Obj/Interventions     Row Name 12/24/19 1527          Static Sitting Balance    Level of [place] (Unsupported Sitting, Static Balance)  supervision  -SC     Sitting Position  (Unsupported Sitting, Static Balance)  sitting on edge of bed  -SC     Time Able to Maintain Position (Unsupported Sitting, Static Balance)  4 to 5 minutes  -SC     Row Name 12/24/19 1527          Static Standing Balance    Level of [place] (Supported Standing, Static Balance)  moderate assist, 50 to 74% patient effort;1 person assist  -SC     Time Able to Maintain Position (Supported Standing, Static Balance)  2 to 3 minutes  -SC     Assistive Device Utilized (Supported Standing, Static Balance)  walker, rolling  -SC     Comment (Supported Standing, Static Balance)  pt able to stand with walker with no  weight to rle  -SC     Row Name 12/24/19 1527          Dynamic Standing Balance    Level of [place], Reaches Outside Midline (Standing, Dynamic Balance)  moderate assist, 50 to 74% patient effort;2 person assist  -SC     Time Able to Maintain Position, Reaches Outside Midline (Standing, Dynamic Balance)  1 to 2 minutes  -SC     Assistive Device Utilized (Supported Standing, Dynamic Balance)  walker, rolling  -SC       User Key  (r) = Recorded By, (t) = Taken By, (c) = Cosigned By    Initials Name Provider Type    Ilene Shirley PTA Physical Therapy Assistant        Goals/Plan    No documentation.       Clinical Impression     Row Name 12/24/19 1529          Pain Assessment    Additional Documentation  Pain Scale: FACES Pre/Post-Treatment (Group)  -[facility] Name 12/24/19 1529          Pain Scale: Numbers Pre/Post-Treatment    Pain Location - Side  Right  -SC     Pain Location - Orientation  lower  -SC     Pain Location  extremity  -SC     Pain Intervention(s)  Rest;Repositioned;Elevated  -SC     Row Name 12/24/19 1529          Pain Scale: FACES Pre/Post-Treatment    Pain: FACES Scale, Pretreatment  2-->hurts little bit  -SC     Pain: FACES Scale, Post-Treatment  8-->hurts whole lot  -SC     Pre/Post Treatment Pain Comment  sacral pain with weight bearing to e  -SC     Row Name 12/24/19 1527           Positioning and Restraints    Pre-Treatment Position  in bed  -SC     Post Treatment Position  chair  -SC     In Chair  notified nsg;reclined;call light within reach;with family/caregiver;exit alarm on  -SC       User Key  (r) = Recorded By, (t) = Taken By, (c) = Cosigned By    Initials Name Provider Type    Ilene Shirley PTA Physical Therapy Assistant        Outcome Measures     Row Name 12/24/19 3430          How much help from another person do you currently need...    Turning from your back to your side while in flat bed without using bedrails?  2  -SC     Moving from lying on back to sitting on the side of a flat bed without bedrails?  2  -SC     Moving to and from a bed to a chair (including a wheelchair)?  2  -SC     Standing up from a chair using your arms (e.g., wheelchair, bedside chair)?  2  -SC     Climbing 3-5 steps with a railing?  1  -SC     To walk in hospital room?  2  -SC     AM-PAC 6 Clicks Score (PT)  11  -SC     Row Name 12/24/19 6689          Functional Assessment    Outcome Measure Options  AM-PAC 6 Clicks Basic Mobility (PT)  -SC       User Key  (r) = Recorded By, (t) = Taken By, (c) = Cosigned By    Initials Name Provider Type    Ilene Shirley PTA Physical Therapy Assistant          PT Recommendation and Plan     Outcome Summary/Treatment Plan (PT)  Anticipated Discharge Disposition (PT): inpatient rehabilitation facility  Plan of Care Reviewed With: patient, spouse  Progress: improving  Outcome Summary: pt with poor tolerance for gait training and transfers.  pt able to stand fairly well with walker but unable to weight shift to rle due to pain.  pt does demonstrate potential to improve but feel this will take some time.  pt's   wants to take pt home but he demonstrated during tx session he is unable to manage her on his own.  recommend IP rehab at d/c to allow pt time to recover her mobility before returning home safely     Time Calculation:   PT Charges     Row  Name 12/24/19 1537             Time Calculation    Start Time  1000  -SC      Stop Time  1035  -SC      Time Calculation (min)  35 min  -SC      PT Received On  12/24/19  -SC      PT - Next Appointment  12/26/19  -SC         Time Calculation- PT    Total Timed Code Minutes- PT  35 minute(s)  -SC         Timed Charges    29305 - Gait Training Minutes   10  -SC      77728 - PT Therapeutic Activity Minutes  25  -SC        User Key  (r) = Recorded By, (t) = Taken By, (c) = Cosigned By    Initials Name Provider Type    SC Ilene Garcia, XANDER Physical Therapy Assistant        Therapy Charges for Today     Code Description Service Date Service Provider Modifiers Qty    50827203048 HC GAIT TRAINING EA 15 MIN 12/24/2019 Ilene Garcia, XANDER GP 1    97941191970 HC PT THERAPEUTIC ACT EA 15 MIN 12/24/2019 Ilene Garcia, XANDER GP 2          PT G-Codes  Outcome Measure Options: AM-PAC 6 Clicks Basic Mobility (PT)  AM-PAC 6 Clicks Score (PT): 11    Ilene Garcia PTA  12/24/2019

## 2019-12-24 NOTE — PROGRESS NOTES
Continued Stay Note  OSBADLO Barrios     Patient Name: Taylor Price  MRN: [id number]  Today's Date: 12/24/2019    Admit Date: 12/22/2019    Discharge Plan     Row Name 12/24/19 1409       Plan    Plan Comments  MILADIS and RN met with pt and spouse at bedside. Spouse states he wants to take pt home as he is worried about her going to rehab. Spouse requests HH and DME (rolling walker and beside commode)    Row Name 12/24/19 8310                         Discharge Codes    No documentation.         COLIN Meredith    Phone # [phone]  Cell #[phone]  Fax#[phone]  [email or web address]      COLIN Meredith

## 2019-12-24 NOTE — PROGRESS NOTES
"      [facility] Medicine Services Daily Progress Note      Hospitalist Team  LOS 0 days      Patient Care Team:  Camelia Miller APRN as PCP - General    Patient Location: 4120/1      Subjective   Subjective     Chief Complaint / Subjective  Chief Complaint   Patient presents with   • Fall   • Foot Pain       Present on Admission:  • Nondisplaced zone i fracture of sacrum, initial encounter for closed fracture (CMS/[facility])  • Dementia of the Alzheimer's type with late onset without behavioral disturbance (CMS/[facility])  • Osteoarthritis  • Hyperlipidemia  • Hypertension  • Depression      Brief Synopsis of Hospital Course/HPI  No complaints of pain today. Continues to require significant assistance with minimal transfers/ADLs.       Review of Systems   Constitution: Negative for chills, decreased appetite, diaphoresis, fever, malaise/fatigue, night sweats, weight gain and weight loss.   Musculoskeletal: Positive for joint pain and joint swelling. Negative for arthritis, back pain, falls, gout, muscle cramps, muscle weakness, myalgias, neck pain and stiffness.   Psychiatric/Behavioral: Positive for altered mental status and memory loss. Negative for depression, hallucinations, hypervigilance, substance abuse, suicidal ideas and thoughts of violence. The patient does not have insomnia and is not nervous/anxious.          Objective   Objective      Vital Signs  Temp:  [98.1 °F (36.7 °C)-98.4 °F (36.9 °C)] 98.2 °F (36.8 °C)  Heart Rate:  [64-76] 69  Resp:  [12-17] 12  BP: (118-163)/(67-81) 118/67  Oxygen Therapy  SpO2: 95 %  Pulse Oximetry Type: Intermittent  Device (Oxygen Therapy): room air  Flowsheet Rows      First Filed Value   Admission Height  149.9 cm (59\") Documented at 12/22/2019 1628   Admission Weight  79.4 kg (175 lb) Documented at 12/22/2019 1628        Intake & Output (last 3 days)       12/21 0701 - 12/22 0700 12/22 0701 - 12/23 0700 12/23 0701 - 12/24 0700 12/24 0701 - 12/25 0700    P.O. "   440 360    Total Intake(mL/kg)   440 (5.5) 360 (4.5)    Urine (mL/kg/hr)  0      Stool  1      Total Output  1      Net  -1 +440 +360            Urine Unmeasured Occurrence  4 x 6 x 1 x    Stool Unmeasured Occurrence  2 x 4 x         Lines, Drains & Airways    Active LDAs     Name:   Placement date:   Placement time:   Site:   Days:    Peripheral IV 12/22/19 1827 Left Antecubital   12/22/19 1827    Antecubital   1                  Physical Exam:    Physical Exam   Constitutional: She appears well-developed and well-nourished. No distress.   HENT:   Head: Normocephalic and atraumatic.   Nose: Nose normal.   Mouth/Throat: Oropharynx is clear and moist.   Eyes: Pupils are equal, round, and reactive to light. Conjunctivae are normal.   Neck: Normal range of motion. Neck supple. No tracheal deviation present.   Cardiovascular: Normal rate, regular rhythm, normal heart sounds and intact distal pulses. Exam reveals no gallop and no friction rub.   No murmur heard.  Pulmonary/Chest: Effort normal and breath sounds normal. No stridor. No respiratory distress. She has no wheezes. She has no rales. She exhibits no tenderness.   Abdominal: Soft. Bowel sounds are normal. She exhibits no distension. There is no tenderness. There is no guarding.   Musculoskeletal: She exhibits tenderness and deformity. She exhibits no edema.   Neurological: She is alert.   Not oriented x3   Skin: Skin is warm and dry. No rash noted. She is not diaphoretic. No erythema. No pallor.         Results Review:     I reviewed the patient's new clinical results.      Lab Results (last 24 hours)     ** No results found for the last 24 hours. **        No results found for: HGBA1C                Microbiology Results (last 10 days)     ** No results found for the last 240 hours. **          ECG/EMG Results (most recent)     None                    Xr Ribs Bilateral 4+ View With Pa Chest    Result Date: 12/23/2019   1. No acute osseous abnormality. No rib  fracture identified. 2. No acute cardiopulmonary process identified. There is a questionable nodular opacity present within the left upper lobe. CT evaluation is suggested to evaluate for underlying mass or nodule.  Electronically Signed ByJonas Hernandez On:12/23/2019 12:16 PM This report was finalized on [id number] by  Jeanette Hernandez, .    Xr Tibia Fibula 2 View Right    Result Date: 12/23/2019  No acute osseous abnormality.  Electronically Signed By-Jeanette Hernandez On:12/23/2019 12:14 PM This report was finalized on [id number] by  Jeanette Hernandez, .    Xr Foot 3+ View Right    Result Date: 12/23/2019  No acute osseous abnormality. Degenerative changes as above.  Electronically Signed By-Jeanette Hernandez On:12/23/2019 12:17 PM This report was finalized on [id number] by  Jeanette Hernandez, .    Xr Pelvis 1 Or 2 View    Result Date: 12/22/2019  1. Subtle buckling of the right S2 sacral arcuate line which may represent a nondisplaced fracture. Correlate clinically with history of trauma to the sacrum. 2. No evidence of hip fracture or dislocation.  Electronically Signed By-Denys Reid On:12/22/2019 5:29 PM This report was finalized on [id number] by  Denys Reid .      Xrays, labs reviewed personally by physician.    Medication Review:   I have reviewed the patient's current medication list      Scheduled Meds    amLODIPine 2.5 mg Oral Daily   atorvastatin 20 mg Oral Daily   calcium-vitamin D 2 tablet Oral Daily   citalopram 20 mg Oral Daily   donepezil 10 mg Oral Nightly   lisinopril 40 mg Oral Q24H   meloxicam 15 mg Oral Daily   pantoprazole 40 mg Oral QAM   sodium chloride 10 mL Intravenous Q12H       Meds Infusions       Meds PRN  •  acetaminophen **OR** acetaminophen **OR** acetaminophen  •  aluminum-magnesium hydroxide-simethicone  •  bisacodyl  •  magnesium hydroxide  •  melatonin  •  ondansetron **OR** ondansetron  •  potassium chloride **OR** potassium chloride **OR** potassium chloride  •  [COMPLETED] Insert  peripheral IV **AND** sodium chloride  •  sodium chloride      Assessment/Plan   Assessment/Plan     Active Hospital Problems    Diagnosis  POA   • **Nondisplaced zone i fracture of sacrum, initial encounter for closed fracture (CMS/[facility]) [S32.110A]  Yes   • Dementia of the Alzheimer's type with late onset without behavioral disturbance (CMS/[facility]) [G30.1, F02.80]  Yes   • Osteoarthritis [M19.90]  Yes   • Hyperlipidemia [E78.5]  Yes   • Depression [F32.9]  Yes   • Hypertension [I10]  Yes      Resolved Hospital Problems   No resolved problems to display.     R foot pain/Chest wall pain- Patient is poor historian complaining of pain in several places. Unclear history per family as well.   -XR are negative, patient not complaining of pain today     Non displaced sacral fracture  -PT/OT eval pending- likely rehab after discharge   -pain medications as needed.     Osteoarthritis  -continue Mobic     Dementia  -Continue Aricept     HTN  -Continue Norvasc and Lotensin     Hyperlipidemia  -continue statin     Depression  -citalopram    Weakness- multifactorial due to underlying medical conditions      -Patient requiring significant assistance with transfer and ADLs. Discussed with  at bedside, will likely need placement in rehab after discharge due to increased needs. Encouraged family discussion and continue work with PT/OT.     Dispo- Patient currently awaiting rehab placement. Medically cleared for discharge.    VTE Prophylaxis - SCDs.      Code Status -   Code Status and Medical Interventions:   Ordered at: 12/22/19 2004     Level Of Support Discussed With:    Patient     Code Status:    CPR     Medical Interventions (Level of Support Prior to Arrest):    Full       Discharge Planning    Destination      Service Provider Request Status Selected Services Address Phone Number Fax Number    [facility] Pending - Request Sent N/A [address] [facility] [place] [address] [phone]       Durable Medical Equipment      Coordination has not been started for this encounter.      Dialysis/Infusion      Coordination has not been started for this encounter.      Home Medical Care      Coordination has not been started for this encounter.      Therapy      Coordination has not been started for this encounter.      Community Resources      Coordination has not been started for this encounter.            Electronically signed by Joanne Todd MD, 12/24/19, 6:26 PM.  [facility] Hospitalist Team

## 2019-12-24 NOTE — PLAN OF CARE
Problem: Patient Care Overview  Goal: Plan of Care Review  Outcome: Ongoing (interventions implemented as appropriate)  Flowsheets (Taken 12/24/2019 1532)  Progress: improving  Plan of Care Reviewed With: patient; spouse  Outcome Summary: pt with poor tolerance for gait training and transfers.  pt able to stand fairly well with walker but unable to weight shift to rle due to pain.  pt does demonstrate potential to improve but feel this will take some time.  pt's   wants to take pt home but he demonstrated during tx session he is unable to manage her on his own.  recommend IP rehab at d/c to allow pt time to recover her mobility before returning home safely

## 2019-12-24 NOTE — PROGRESS NOTES
"      [facility] Medicine Services Daily Progress Note      Hospitalist Team  LOS 1 days      Patient Care Team:  Camelia Miller APRN as PCP - General    Patient Location: 4120/1      Subjective   Subjective     Chief Complaint / Subjective  Chief Complaint   Patient presents with   • Fall   • Foot Pain       Present on Admission:  • Nondisplaced zone i fracture of sacrum, initial encounter for closed fracture (CMS/[facility])  • Dementia of the Alzheimer's type with late onset without behavioral disturbance (CMS/[facility])  • Osteoarthritis  • Hyperlipidemia  • Hypertension  • Depression      Brief Synopsis of Hospital Course/HPI  Complaining of pain in right foot, right ankle region as well as chest wall. Poor historian, family at bedside.       Review of Systems   Constitution: Negative for chills, decreased appetite, diaphoresis, fever, malaise/fatigue, night sweats, weight gain and weight loss.   Musculoskeletal: Positive for joint pain and joint swelling. Negative for arthritis, back pain, falls, gout, muscle cramps, muscle weakness, myalgias, neck pain and stiffness.   Psychiatric/Behavioral: Positive for altered mental status and memory loss. Negative for depression, hallucinations, hypervigilance, substance abuse, suicidal ideas and thoughts of violence. The patient does not have insomnia and is not nervous/anxious.          Objective   Objective      Vital Signs  Temp:  [97.5 °F (36.4 °C)-98.4 °F (36.9 °C)] 98.4 °F (36.9 °C)  Heart Rate:  [64-93] 93  Resp:  [15-20] 18  BP: (110-195)/(60-77) 110/60  Oxygen Therapy  SpO2: 92 %  Pulse Oximetry Type: Intermittent  Device (Oxygen Therapy): room air  Flowsheet Rows      First Filed Value   Admission Height  149.9 cm (59\") Documented at 12/22/2019 1628   Admission Weight  79.4 kg (175 lb) Documented at 12/22/2019 1628        Intake & Output (last 3 days)       12/21 0701 - 12/22 0700 12/22 0701 - 12/23 0700 12/23 0701 - 12/24 0700    P.O.   440    " Total Intake(mL/kg)   440 (5.5)    Urine (mL/kg/hr)  0     Stool  1     Total Output  1     Net  -1 +440           Urine Unmeasured Occurrence  4 x 4 x    Stool Unmeasured Occurrence  2 x 4 x        Lines, Drains & Airways    Active LDAs     Name:   Placement date:   Placement time:   Site:   Days:    Peripheral IV 12/22/19 1827 Left Antecubital   12/22/19 1827    Antecubital   1                  Physical Exam:    Physical Exam   Constitutional: She appears well-developed and well-nourished. No distress.   HENT:   Head: Normocephalic and atraumatic.   Nose: Nose normal.   Mouth/Throat: Oropharynx is clear and moist.   Eyes: Pupils are equal, round, and reactive to light. Conjunctivae are normal.   Neck: Normal range of motion. Neck supple. No tracheal deviation present.   Cardiovascular: Normal rate, regular rhythm, normal heart sounds and intact distal pulses. Exam reveals no gallop and no friction rub.   No murmur heard.  Pulmonary/Chest: Effort normal and breath sounds normal. No stridor. No respiratory distress. She has no wheezes. She has no rales. She exhibits no tenderness.   Abdominal: Soft. Bowel sounds are normal. She exhibits no distension. There is no tenderness. There is no guarding.   Musculoskeletal: She exhibits tenderness and deformity. She exhibits no edema.   Neurological: She is alert.   Not oriented x3   Skin: Skin is warm and dry. No rash noted. She is not diaphoretic. No erythema. No pallor.         Procedures:              Results Review:     I reviewed the patient's new clinical results.      Lab Results (last 24 hours)     Procedure Component Value Units Date/Time    Basic Metabolic Panel [[id number]]  (Abnormal) Collected:  12/23/19 0341    Specimen:  Blood Updated:  12/23/19 0450     Glucose 119 mg/dL      BUN 18 mg/dL      Creatinine 0.79 mg/dL      Sodium 141 mmol/L      Potassium 3.0 mmol/L      Chloride 108 mmol/L      CO2 18.0 mmol/L      Calcium 9.1 mg/dL      eGFR Non African  Amer 70 mL/min/1.73      BUN/Creatinine Ratio 22.8     Anion Gap 15.0 mmol/L     Narrative:       GFR Normal >60  Chronic Kidney Disease <60  Kidney Failure <15      CBC Auto Differential [[id number]]  (Abnormal) Collected:  12/23/19 0341    Specimen:  Blood Updated:  12/23/19 0417     WBC 12.90 10*3/mm3      RBC 4.46 10*6/mm3      Hemoglobin 13.3 g/dL      Hematocrit 40.0 %      MCV 89.5 fL      MCH 29.8 pg      MCHC 33.3 g/dL      RDW 14.9 %      RDW-SD 46.8 fl      MPV 9.3 fL      Platelets 176 10*3/mm3      Neutrophil % 84.5 %      Lymphocyte % 9.6 %      Monocyte % 4.8 %      Eosinophil % 0.9 %      Basophil % 0.2 %      Neutrophils, Absolute 10.90 10*3/mm3      Lymphocytes, Absolute 1.20 10*3/mm3      Monocytes, Absolute 0.60 10*3/mm3      Eosinophils, Absolute 0.10 10*3/mm3      Basophils, Absolute 0.00 10*3/mm3      nRBC 0.0 /100 WBC         No results found for: HGBA1C                Microbiology Results (last 10 days)     ** No results found for the last 240 hours. **          ECG/EMG Results (most recent)     None                    Xr Ribs Bilateral 4+ View With Pa Chest    Result Date: 12/23/2019   1. No acute osseous abnormality. No rib fracture identified. 2. No acute cardiopulmonary process identified. There is a questionable nodular opacity present within the left upper lobe. CT evaluation is suggested to evaluate for underlying mass or nodule.  Electronically Signed ByJonas Hernandez On:12/23/2019 12:16 PM This report was finalized on [id number] by  Jeanette Hernandez, .    Xr Tibia Fibula 2 View Right    Result Date: 12/23/2019  No acute osseous abnormality.  Electronically Signed ByJonas Hernandez On:12/23/2019 12:14 PM This report was finalized on [id number] by  Rosa Tristan    Xr Foot 3+ View Right    Result Date: 12/23/2019  No acute osseous abnormality. Degenerative changes as above.  Electronically Signed ByJonas Hernandez On:12/23/2019 12:17 PM This report was finalized on [id number] by  Jeanette  Mary, .    Xr Pelvis 1 Or 2 View    Result Date: 12/22/2019  1. Subtle buckling of the right S2 sacral arcuate line which may represent a nondisplaced fracture. Correlate clinically with history of trauma to the sacrum. 2. No evidence of hip fracture or dislocation.  Electronically Signed By-Denys Reid On:12/22/2019 5:29 PM This report was finalized on [id number] by  Denys Reid .      Xrays, labs reviewed personally by physician.    Medication Review:   I have reviewed the patient's current medication list      Scheduled Meds    amLODIPine 2.5 mg Oral Daily   atorvastatin 20 mg Oral Daily   calcium-vitamin D 2 tablet Oral Daily   citalopram 20 mg Oral Daily   donepezil 10 mg Oral Nightly   lisinopril 40 mg Oral Q24H   meloxicam 15 mg Oral Daily   pantoprazole 40 mg Oral QAM   sodium chloride 10 mL Intravenous Q12H       Meds Infusions       Meds PRN  •  acetaminophen **OR** acetaminophen **OR** acetaminophen  •  aluminum-magnesium hydroxide-simethicone  •  bisacodyl  •  magnesium hydroxide  •  melatonin  •  ondansetron **OR** ondansetron  •  potassium chloride **OR** potassium chloride **OR** potassium chloride  •  [COMPLETED] Insert peripheral IV **AND** sodium chloride  •  sodium chloride      Assessment/Plan   Assessment/Plan     Active Hospital Problems    Diagnosis  POA   • **Nondisplaced zone i fracture of sacrum, initial encounter for closed fracture (CMS/[facility]) [S32.110A]  Yes   • Dementia of the Alzheimer's type with late onset without behavioral disturbance (CMS/[facility]) [G30.1, F02.80]  Yes   • Osteoarthritis [M19.90]  Yes   • Hyperlipidemia [E78.5]  Yes   • Depression [F32.9]  Yes   • Hypertension [I10]  Yes      Resolved Hospital Problems   No resolved problems to display.     R foot pain/Chest wall pain- Patient is poor historian complaining of pain in several places. Unclear history per family as well.   -XR of foot   -XR of chest wall     Non displaced sacral fracture  -PT/OT eval pending-  likely rehab after discharge   -pain medications as needed.     Osteoarthritis  -continue Mobic     Dementia  -Continue Aricept     HTN  -Continue Norvasc and Lotensin     Hyperlipidemia  -continue statin     Depression  -citalopram        VTE Prophylaxis - SCDs.      Code Status -   Code Status and Medical Interventions:   Ordered at: 12/22/19 2004     Level Of Support Discussed With:    Patient     Code Status:    CPR     Medical Interventions (Level of Support Prior to Arrest):    Full       Discharge Planning    Destination      Service Provider Request Status Selected Services Address Phone Number Fax Number    [facility] Pending - Request Sent N/A [address] [facility] [place] [address] [phone]      Durable Medical Equipment      Coordination has not been started for this encounter.      Dialysis/Infusion      Coordination has not been started for this encounter.      Home Medical Care      Coordination has not been started for this encounter.      Therapy      Coordination has not been started for this encounter.      Community Resources      Coordination has not been started for this encounter.            Electronically signed by Joanne Todd MD, 12/23/19, 9:14 PM.  [facility] Hospitalist Team

## 2019-12-24 NOTE — DISCHARGE PLACEMENT REQUEST
"Viraj Price (79 y.o. Female)     Date of Birth Social Security Number Address Home Phone MRN    1940  [id number] [place] [address] [phone] [id number]    [employer] Status          Non-Worship        Admission Date Admission Type Admitting Provider Attending Provider Department, Room/Bed    12/22/19 Emergency Joanne Todd MD Nallapuram, Divya, MD [facility] SURGICAL INPATIENT, 4120/1    Discharge Date Discharge Disposition Discharge Destination                       Attending Provider:  Joanne Todd MD    Allergies:  No Known Allergies    Isolation:  None   Infection:  None   Code Status:  CPR    Ht:  149.9 cm (59\")   Wt:  79.6 kg (175 lb 7.8 oz)    Admission Cmt:  None   Principal Problem:  Nondisplaced zone i fracture of sacrum, initial encounter for closed fracture (CMS/[facility]) [S32.110A]                 Active Insurance as of 12/22/2019     Primary Coverage     Payor Plan Insurance Group Employer/Plan Group    HUMANA MEDICARE REPLACEMENT HU[employer]NA MEDICARE REPLACEMENT [id number]     Payor Plan Address Payor Plan Phone Number Payor Plan Fax Number Effective Dates    [address] [address] [phone]  1/1/2018 - None Entered    [facility] [address]       Subscriber Name Subscriber Birth Date Member ID       VIRAJ PRICE 1940 [id number]                 Emergency Contacts      (Rel.) Home Phone Work Phone Mobile Phone    ANDRESSA PRICE (Spouse) -- -- [phone]          "

## 2019-12-24 NOTE — PLAN OF CARE
Patient is pending IP Rehab placement. [place], [facility], or [facility] are her choices at this time.  is very supportive and remains at bedside. Plan of care ongoing.

## 2019-12-24 NOTE — PLAN OF CARE
Patient and spouse not receptive to IP rehab. But Patient's daughter thinks rehab is thee best choice for patient and states she will speak with her parents. Patient's daughter states the that if her patient and spouse can stay at the rehab together she thinks they will be more receptive.

## 2019-12-25 PROCEDURE — 63710000001 MELATONIN 5 MG TABLET: Performed by: NURSE PRACTITIONER

## 2019-12-25 PROCEDURE — A9270 NON-COVERED ITEM OR SERVICE: HCPCS | Performed by: NURSE PRACTITIONER

## 2019-12-25 PROCEDURE — 63710000001 ATORVASTATIN 20 MG TABLET: Performed by: NURSE PRACTITIONER

## 2019-12-25 PROCEDURE — 63710000001 LISINOPRIL 20 MG TABLET: Performed by: NURSE PRACTITIONER

## 2019-12-25 PROCEDURE — 63710000001 ACETAMINOPHEN 325 MG TABLET: Performed by: NURSE PRACTITIONER

## 2019-12-25 PROCEDURE — 63710000001 AMLODIPINE 5 MG TABLET: Performed by: NURSE PRACTITIONER

## 2019-12-25 PROCEDURE — G0378 HOSPITAL OBSERVATION PER HR: HCPCS

## 2019-12-25 PROCEDURE — 63710000001 BISACODYL 5 MG TABLET DELAYED-RELEASE: Performed by: NURSE PRACTITIONER

## 2019-12-25 PROCEDURE — 63710000001 CALCIUM-VITAMIN D 500-200 MG-UNIT TABLET: Performed by: NURSE PRACTITIONER

## 2019-12-25 PROCEDURE — 63710000001 CITALOPRAM 20 MG TABLET: Performed by: NURSE PRACTITIONER

## 2019-12-25 PROCEDURE — 63710000001 MELOXICAM 15 MG TABLET: Performed by: NURSE PRACTITIONER

## 2019-12-25 PROCEDURE — 63710000001 PANTOPRAZOLE 40 MG TABLET DELAYED-RELEASE: Performed by: NURSE PRACTITIONER

## 2019-12-25 PROCEDURE — 63710000001 DONEPEZIL 5 MG TABLET: Performed by: NURSE PRACTITIONER

## 2019-12-25 PROCEDURE — 96376 TX/PRO/DX INJ SAME DRUG ADON: CPT

## 2019-12-25 PROCEDURE — 99225 PR SBSQ OBSERVATION CARE/DAY 25 MINUTES: CPT | Performed by: HOSPITALIST

## 2019-12-25 RX ORDER — HYDRALAZINE HYDROCHLORIDE 20 MG/ML
10 INJECTION INTRAMUSCULAR; INTRAVENOUS ONCE
Status: COMPLETED | OUTPATIENT
Start: 2019-12-26 | End: 2019-12-25

## 2019-12-25 RX ADMIN — MELOXICAM 15 MG: 15 TABLET ORAL at 08:33

## 2019-12-25 RX ADMIN — MELATONIN TAB 5 MG 5 MG: 5 TAB at 20:07

## 2019-12-25 RX ADMIN — ACETAMINOPHEN 650 MG: 325 TABLET ORAL at 18:22

## 2019-12-25 RX ADMIN — ACETAMINOPHEN 650 MG: 325 TABLET ORAL at 22:22

## 2019-12-25 RX ADMIN — DONEPEZIL HYDROCHLORIDE 10 MG: 5 TABLET, FILM COATED ORAL at 20:07

## 2019-12-25 RX ADMIN — Medication 10 ML: at 22:13

## 2019-12-25 RX ADMIN — BISACODYL 5 MG: 5 TABLET, COATED ORAL at 21:06

## 2019-12-25 RX ADMIN — HYDRALAZINE HYDROCHLORIDE 10 MG: 20 INJECTION INTRAMUSCULAR; INTRAVENOUS at 23:30

## 2019-12-25 RX ADMIN — ACETAMINOPHEN 650 MG: 325 TABLET ORAL at 07:33

## 2019-12-25 RX ADMIN — ATORVASTATIN CALCIUM 20 MG: 20 TABLET, FILM COATED ORAL at 08:34

## 2019-12-25 RX ADMIN — OYSTER SHELL CALCIUM WITH VITAMIN D 2 TABLET: 500; 200 TABLET, FILM COATED ORAL at 08:33

## 2019-12-25 RX ADMIN — CITALOPRAM HYDROBROMIDE 20 MG: 20 TABLET ORAL at 08:37

## 2019-12-25 RX ADMIN — LISINOPRIL 40 MG: 20 TABLET ORAL at 08:33

## 2019-12-25 RX ADMIN — PANTOPRAZOLE SODIUM 40 MG: 40 TABLET, DELAYED RELEASE ORAL at 08:32

## 2019-12-25 RX ADMIN — Medication 10 ML: at 08:34

## 2019-12-25 RX ADMIN — AMLODIPINE BESYLATE 2.5 MG: 5 TABLET ORAL at 08:33

## 2019-12-25 NOTE — PROGRESS NOTES
"      [facility] Medicine Services Daily Progress Note      Hospitalist Team  LOS 0 days      Patient Care Team:  Camelia Miller APRN as PCP - General    Patient Location: 4120/1      Subjective   Subjective     Chief Complaint / Subjective  Chief Complaint   Patient presents with   • Fall   • Foot Pain       Present on Admission:  • Nondisplaced zone i fracture of sacrum, initial encounter for closed fracture (CMS/[facility])  • Dementia of the Alzheimer's type with late onset without behavioral disturbance (CMS/[facility])  • Osteoarthritis  • Hyperlipidemia  • Hypertension  • Depression      Brief Synopsis of Hospital Course/HPI  Patient without complaints, remains at mental baseline, pleasantly confused.       Review of Systems   Constitution: Negative for chills, decreased appetite, diaphoresis, fever, malaise/fatigue, night sweats, weight gain and weight loss.   Musculoskeletal: Positive for joint pain and joint swelling. Negative for arthritis, back pain, falls, gout, muscle cramps, muscle weakness, myalgias, neck pain and stiffness.   Psychiatric/Behavioral: Positive for altered mental status and memory loss. Negative for depression, hallucinations, hypervigilance, substance abuse, suicidal ideas and thoughts of violence. The patient does not have insomnia and is not nervous/anxious.          Objective   Objective      Vital Signs  Temp:  [98.1 °F (36.7 °C)-98.2 °F (36.8 °C)] 98.1 °F (36.7 °C)  Heart Rate:  [65-70] 65  Resp:  [15] 15  BP: (144-151)/(64-72) 151/72  Oxygen Therapy  SpO2: 95 %  Pulse Oximetry Type: Intermittent  Device (Oxygen Therapy): room air  Flowsheet Rows      First Filed Value   Admission Height  149.9 cm (59\") Documented at 12/22/2019 1628   Admission Weight  79.4 kg (175 lb) Documented at 12/22/2019 1628        Intake & Output (last 3 days)       12/22 0701 - 12/23 0700 12/23 0701 - 12/24 0700 12/24 0701 - 12/25 0700 12/25 0701 - 12/26 0700    P.O.  440 600 720    Total " Intake(mL/kg)  440 (5.5) 600 (7.5) 720 (9)    Urine (mL/kg/hr) 0  200 (0.1) 250 (0.3)    Stool 1       Total Output 1  200 250    Net -1 +440 +400 +470            Urine Unmeasured Occurrence 4 x 6 x 2 x     Stool Unmeasured Occurrence 2 x 4 x          Lines, Drains & Airways    Active LDAs     Name:   Placement date:   Placement time:   Site:   Days:    Peripheral IV 12/22/19 1827 Left Antecubital   12/22/19 1827    Antecubital   1                  Physical Exam:    Physical Exam   Constitutional: She appears well-developed and well-nourished. No distress.   HENT:   Head: Normocephalic and atraumatic.   Nose: Nose normal.   Mouth/Throat: Oropharynx is clear and moist.   Eyes: Pupils are equal, round, and reactive to light. Conjunctivae are normal.   Neck: Normal range of motion. Neck supple. No tracheal deviation present.   Cardiovascular: Normal rate, regular rhythm, normal heart sounds and intact distal pulses. Exam reveals no gallop and no friction rub.   No murmur heard.  Pulmonary/Chest: Effort normal and breath sounds normal. No stridor. No respiratory distress. She has no wheezes. She has no rales. She exhibits no tenderness.   Abdominal: Soft. Bowel sounds are normal. She exhibits no distension. There is no tenderness. There is no guarding.   Musculoskeletal: She exhibits tenderness and deformity. She exhibits no edema.   Neurological: She is alert.   Not oriented x3   Skin: Skin is warm and dry. No rash noted. She is not diaphoretic. No erythema. No pallor.         Results Review:     I reviewed the patient's new clinical results.      Lab Results (last 24 hours)     ** No results found for the last 24 hours. **        No results found for: HGBA1C                Microbiology Results (last 10 days)     ** No results found for the last 240 hours. **          ECG/EMG Results (most recent)     None                    Xr Ribs Bilateral 4+ View With Pa Chest    Result Date: 12/23/2019   1. No acute osseous  abnormality. No rib fracture identified. 2. No acute cardiopulmonary process identified. There is a questionable nodular opacity present within the left upper lobe. CT evaluation is suggested to evaluate for underlying mass or nodule.  Electronically Signed ByJonas Hernandez On:12/23/2019 12:16 PM This report was finalized on [id number] by  Jeanette Hernandze, .    Xr Tibia Fibula 2 View Right    Result Date: 12/23/2019  No acute osseous abnormality.  Electronically Signed ByJonas Hernandez On:12/23/2019 12:14 PM This report was finalized on [id number] by  Jeanette Hernandez, .    Xr Foot 3+ View Right    Result Date: 12/23/2019  No acute osseous abnormality. Degenerative changes as above.  Electronically Signed By-Jeanette Hernandez On:12/23/2019 12:17 PM This report was finalized on [id number] by  Jeanette Hernandez, .    Xr Pelvis 1 Or 2 View    Result Date: 12/22/2019  1. Subtle buckling of the right S2 sacral arcuate line which may represent a nondisplaced fracture. Correlate clinically with history of trauma to the sacrum. 2. No evidence of hip fracture or dislocation.  Electronically Signed ByArleth Reid On:12/22/2019 5:29 PM This report was finalized on [id number] by  Denys Reid .      Xrays, labs reviewed personally by physician.    Medication Review:   I have reviewed the patient's current medication list      Scheduled Meds    amLODIPine 2.5 mg Oral Daily   atorvastatin 20 mg Oral Daily   calcium-vitamin D 2 tablet Oral Daily   citalopram 20 mg Oral Daily   donepezil 10 mg Oral Nightly   lisinopril 40 mg Oral Q24H   meloxicam 15 mg Oral Daily   pantoprazole 40 mg Oral QAM   sodium chloride 10 mL Intravenous Q12H       Meds Infusions       Meds PRN  •  acetaminophen **OR** acetaminophen **OR** acetaminophen  •  aluminum-magnesium hydroxide-simethicone  •  bisacodyl  •  magnesium hydroxide  •  melatonin  •  ondansetron **OR** ondansetron  •  potassium chloride **OR** potassium chloride **OR** potassium chloride  •   [COMPLETED] Insert peripheral IV **AND** sodium chloride  •  sodium chloride      Assessment/Plan   Assessment/Plan     Active Hospital Problems    Diagnosis  POA   • **Nondisplaced zone i fracture of sacrum, initial encounter for closed fracture (CMS/[facility]) [S32.110A]  Yes   • Dementia of the Alzheimer's type with late onset without behavioral disturbance (CMS/[facility]) [G30.1, F02.80]  Yes   • Osteoarthritis [M19.90]  Yes   • Hyperlipidemia [E78.5]  Yes   • Depression [F32.9]  Yes   • Hypertension [I10]  Yes      Resolved Hospital Problems   No resolved problems to display.     R foot pain/Chest wall pain- Resolved per patient     Non displaced sacral fracture  -PT/OT eval pending- likely rehab after discharge   -pain medications as needed.     Osteoarthritis  -continue Mobic     Dementia  -Continue Aricept     HTN  -Continue Norvasc and Lotensin     Hyperlipidemia  -continue statin     Depression  -citalopram    Weakness- multifactorial due to underlying medical conditions      -Patient requiring significant assistance with transfer and ADLs. Discussed with  at bedside, will likely need placement in rehab after discharge due to increased needs. Encouraged family discussion and continue work with PT/OT.     Dispo- Patient currently awaiting rehab placement. Medically cleared for discharge.    Spent over 20 minutes today counseling patient's  in regards to patient's significant fall risk at home, however  continuing to request taking patient home. Will encourage PT/OT to work with  tomorrow.     VTE Prophylaxis - SCDs.      Code Status -   Code Status and Medical Interventions:   Ordered at: 12/22/19 2004     Level Of Support Discussed With:    Patient     Code Status:    CPR     Medical Interventions (Level of Support Prior to Arrest):    Full       Discharge Planning    Destination      Service Provider Request Status Selected Services Address Phone Number Fax Number    [facility]  [facility] Pending - Request Sent N/A [address], [place] [address] [phone]      Durable Medical Equipment      Coordination has not been started for this encounter.      Dialysis/Infusion      Coordination has not been started for this encounter.      Home Medical Care      Coordination has not been started for this encounter.      Therapy      Coordination has not been started for this encounter.      Community Resources      Coordination has not been started for this encounter.            Electronically signed by Joanne Todd MD, 12/25/19, 5:56 PM.  [facility] Hospitalist Team

## 2019-12-25 NOTE — PLAN OF CARE
Patient pain is control with tylenol. Patient ambulates x2 assist with walker and constant cues while ambulating. Hopefully a decision from  on rehab vs HH soon

## 2019-12-26 VITALS
HEART RATE: 66 BPM | OXYGEN SATURATION: 98 % | HEIGHT: 59 IN | TEMPERATURE: 97.9 F | WEIGHT: 175.49 LBS | BODY MASS INDEX: 35.38 KG/M2 | RESPIRATION RATE: 16 BRPM | SYSTOLIC BLOOD PRESSURE: 138 MMHG | DIASTOLIC BLOOD PRESSURE: 70 MMHG

## 2019-12-26 PROBLEM — R41.0 DELIRIUM: Status: ACTIVE | Noted: 2019-12-26

## 2019-12-26 PROBLEM — R54 AGE-RELATED PHYSICAL DEBILITY: Status: ACTIVE | Noted: 2019-12-26

## 2019-12-26 PROCEDURE — A9270 NON-COVERED ITEM OR SERVICE: HCPCS | Performed by: NURSE PRACTITIONER

## 2019-12-26 PROCEDURE — 63710000001 PANTOPRAZOLE 40 MG TABLET DELAYED-RELEASE: Performed by: NURSE PRACTITIONER

## 2019-12-26 PROCEDURE — 63710000001 CITALOPRAM 20 MG TABLET: Performed by: NURSE PRACTITIONER

## 2019-12-26 PROCEDURE — 97535 SELF CARE MNGMENT TRAINING: CPT

## 2019-12-26 PROCEDURE — 63710000001 ACETAMINOPHEN 325 MG TABLET: Performed by: NURSE PRACTITIONER

## 2019-12-26 PROCEDURE — 63710000001 AMLODIPINE 5 MG TABLET: Performed by: NURSE PRACTITIONER

## 2019-12-26 PROCEDURE — 63710000001: Performed by: NURSE PRACTITIONER

## 2019-12-26 PROCEDURE — 63710000001 ATORVASTATIN 20 MG TABLET: Performed by: NURSE PRACTITIONER

## 2019-12-26 PROCEDURE — 63710000001 LISINOPRIL 20 MG TABLET: Performed by: NURSE PRACTITIONER

## 2019-12-26 PROCEDURE — G0378 HOSPITAL OBSERVATION PER HR: HCPCS

## 2019-12-26 PROCEDURE — 63710000001 MELOXICAM 15 MG TABLET: Performed by: NURSE PRACTITIONER

## 2019-12-26 PROCEDURE — 25010000002 HYDRALAZINE PER 20 MG: Performed by: HOSPITALIST

## 2019-12-26 PROCEDURE — 97530 THERAPEUTIC ACTIVITIES: CPT

## 2019-12-26 PROCEDURE — 99217 PR OBSERVATION CARE DISCHARGE MANAGEMENT: CPT | Performed by: HOSPITALIST

## 2019-12-26 RX ADMIN — CITALOPRAM HYDROBROMIDE 20 MG: 20 TABLET ORAL at 08:32

## 2019-12-26 RX ADMIN — ATORVASTATIN CALCIUM 20 MG: 20 TABLET, FILM COATED ORAL at 08:32

## 2019-12-26 RX ADMIN — OYSTER SHELL CALCIUM WITH VITAMIN D 2 TABLET: 500; 200 TABLET, FILM COATED ORAL at 08:32

## 2019-12-26 RX ADMIN — MELOXICAM 15 MG: 15 TABLET ORAL at 08:32

## 2019-12-26 RX ADMIN — ACETAMINOPHEN 650 MG: 325 TABLET ORAL at 10:35

## 2019-12-26 RX ADMIN — LISINOPRIL 40 MG: 20 TABLET ORAL at 08:32

## 2019-12-26 RX ADMIN — Medication 10 ML: at 08:33

## 2019-12-26 RX ADMIN — PANTOPRAZOLE SODIUM 40 MG: 40 TABLET, DELAYED RELEASE ORAL at 06:01

## 2019-12-26 RX ADMIN — ACETAMINOPHEN 650 MG: 325 TABLET ORAL at 02:18

## 2019-12-26 RX ADMIN — ACETAMINOPHEN 650 MG: 325 TABLET ORAL at 05:55

## 2019-12-26 RX ADMIN — ACETAMINOPHEN 650 MG: 325 TABLET ORAL at 16:23

## 2019-12-26 RX ADMIN — AMLODIPINE BESYLATE 2.5 MG: 5 TABLET ORAL at 08:32

## 2019-12-26 NOTE — PLAN OF CARE
Patient was complaining of pain upon shift change. Pain now under control. Resting. Anticipating DC with HH. Plan of Care ongoing.

## 2019-12-26 NOTE — DISCHARGE PLACEMENT REQUEST
"Viraj Price (79 y.o. Female)     Date of Birth Social Security Number Address Home Phone MRN    1940  [address] [place] [address] [phone] [id number]    Buddhism Marital Status          Non-Adventism        Admission Date Admission Type Admitting Provider Attending Provider Department, Room/Bed    12/22/19 Emergency Joanne Todd MD Nallapuram, Divya, MD [facility] INPATIENT, 4120/1    Discharge Date Discharge Disposition Discharge Destination                       Attending Provider:  Joanne Todd MD    Allergies:  No Known Allergies    Isolation:  None   Infection:  None   Code Status:  CPR    Ht:  149.9 cm (59\")   Wt:  79.6 kg (175 lb 7.8 oz)    Admission Cmt:  None   Principal Problem:  Nondisplaced zone i fracture of sacrum, initial encounter for closed fracture (CMS/[facility]) [S32.110A]                 Active Insurance as of 12/22/2019     Primary Coverage     Payor Plan Insurance Group Employer/Plan Group    HUMANA MEDICARE REPLACEMENT HUMANA MEDICARE REPLACEMENT [id number]     Payor Plan Address Payor Plan Phone Number Payor Plan Fax Number Effective Dates    [address] [address] [phone]  1/1/2018 - None Entered    [facility] [address]       Subscriber Name Subscriber Birth Date Member ID       VIRAJ PRICE 1940 [id number]                 Emergency Contacts      (Rel.) Home Phone Work Phone Mobile Phone    ANDRESSA PRICE (Spouse) -- -- [phone]              "

## 2019-12-26 NOTE — THERAPY TREATMENT NOTE
Patient Name: Taylor Price  : 1940    MRN: [id number]                              Today's Date: 2019       Admit Date: 2019    Visit Dx:     ICD-10-CM ICD-9-CM   1. Nondisplaced zone i fracture of sacrum, initial encounter for closed fracture (CMS/[facility]) S32.110A 805.6   2. Fall, initial encounter W19.XXXA E888.9     Patient Active Problem List   Diagnosis   • Nondisplaced zone i fracture of sacrum, initial encounter for closed fracture (CMS/[facility])   • Hyperlipidemia   • Hypertension   • Dementia of the Alzheimer's type with late onset without behavioral disturbance (CMS/[facility])   • Osteoarthritis   • Depression   • Age-related physical debility   • Delirium     Past Medical History:   Diagnosis Date   • Alzheimer disease (CMS/[facility])    • Arthritis    • Depression    • GERD (gastroesophageal reflux disease)    • Hypertension      Past Surgical History:   Procedure Laterality Date   • HYSTERECTOMY       General Information     Row Name 19 1410          PT Evaluation Time/Intention    Document Type  therapy note (daily note)  -SC     Mode of Treatment  physical therapy;individual therapy  -SC     Row Name 19 1410          General Information    Existing Precautions/Restrictions  fall  -SC     Row Name 19 1410          Cognitive Assessment/Intervention- PT/OT    Orientation Status (Cognition)  oriented to;person  -SC     Cognitive Assessment/Intervention Comment  pt only able to tell me her name  -SC     Row Name 19 1410          Safety Issues, Functional Mobility    Safety Issues Affecting Function (Mobility)  ability to follow commands;insight into deficits/self awareness;problem solving;safety precautions follow-through/compliance;safety precaution awareness  -SC     Impairments Affecting Function (Mobility)  cognition;endurance/activity tolerance;pain;strength;balance  -SC       User Key  (r) = Recorded By, (t) = Taken By, (c) = Cosigned By    Initials Name Provider Type    SC  Ilene Garcia PTA Physical Therapy Assistant        Mobility     Row Name 12/26/19 1411          Bed Mobility Assessment/Treatment    Comment (Bed Mobility)  pt up to bedside comode  -SC     Row Name 12/26/19 1411          Transfer Assessment/Treatment    Comment (Transfers)  pt's  present for transfer training.  gait belt on patient.  pt's  correctly instructed pt to push up from arms of bs comode.  pt stood with assist of her .  pt then was able to correctly instruct pt to reach back to sitting surface and lower herself down into the chair.  practiced 2 times.  pt's  use gait  belt correctely.    -SC     Row Name 12/26/19 1411          Bed-Chair Transfer    Bed-Chair Summers (Transfers)  minimum assist (75% patient effort)  -SC     Assistive Device (Bed-Chair Transfers)  walker, front-wheeled  -SC     Row Name 12/26/19 1411          Sit-Stand Transfer    Sit-Stand Summers (Transfers)  minimum assist (75% patient effort)  -SC     Assistive Device (Sit-Stand Transfers)  walker, front-wheeled  -SC     Row Name 12/26/19 1411          Gait/Stairs Assessment/Training    Distance in Feet (Gait)  2 to 3 steps in order for pt to work on transfers  -SC     Deviations/Abnormal Patterns (Gait)  antalgic;gait speed decreased;base of support, narrow  -SC     Right Sided Gait Deviations  heel strike decreased  -SC     Comment (Gait/Stairs)  with weight bearing to rle pt did not demonstrate as much pain as she did on 12/24/2019  -SC     Row Name 12/26/19 1411          Mobility Assessment/Intervention    Extremity Weight-bearing Status  right lower extremity  -SC     Right Lower Extremity (Weight-bearing Status)  weight-bearing as tolerated (WBAT)  -SC       User Key  (r) = Recorded By, (t) = Taken By, (c) = Cosigned By    Initials Name Provider Type    SC Ilene Garcia PTA Physical Therapy Assistant        Obj/Interventions     Row Name 12/26/19 1416          Static Standing Balance     Level of Terry (Supported Standing, Static Balance)  contact guard assist  -SC     Time Able to Maintain Position (Supported Standing, Static Balance)  3 to 4 minutes  -SC     Assistive Device Utilized (Supported Standing, Static Balance)  walker, rolling  -SC     Comment (Supported Standing, Static Balance)  pt stood long enough for nurse to clean pt up and for pt's  to transfer pt to the chair  -[facility] Name 12/26/19 1416          Dynamic Standing Balance    Level of Terry, Reaches Outside Midline (Standing, Dynamic Balance)  minimal assist, 75% patient effort  -SC     Time Able to Maintain Position, Reaches Outside Midline (Standing, Dynamic Balance)  1 to 2 minutes  -SC     Assistive Device Utilized (Supported Standing, Dynamic Balance)  walker, rolling  -SC       User Key  (r) = Recorded By, (t) = Taken By, (c) = Cosigned By    Initials Name Provider Type    Ilene Shirley PTA Physical Therapy Assistant        Goals/Plan     [facility] Name 12/26/19 1423          Bed Mobility Goal 1 (PT)    Progress/Outcomes (Bed Mobility Goal 1, PT)  goal not met  -SC     Row Name 12/26/19 1423          Transfer Goal 1 (PT)    Progress/Outcome (Transfer Goal 1, PT)  goal not met  -SC     Row Name 12/26/19 1423          Gait Training Goal 1 (PT)    Progress/Outcome (Gait Training Goal 1, PT)  goal not met  -SC       User Key  (r) = Recorded By, (t) = Taken By, (c) = Cosigned By    Initials Name Provider Type    Ilene Shirley PTA Physical Therapy Assistant        Clinical Impression     [facility] Name 12/26/19 1418          Pain Assessment    Additional Documentation  Pain Scale: FACES Pre/Post-Treatment (Group)  -[facility] Name 12/26/19 1418          Pain Scale: Numbers Pre/Post-Treatment    Pain Location - Side  Right  -SC     Pain Location  hip  -[facility] Name 12/26/19 1418          Pain Scale: FACES Pre/Post-Treatment    Pain: FACES Scale, Pretreatment  0-->no hurt  -SC     Pain: FACES Scale,  Post-Treatment  6-->hurts even more  -SC     Pre/Post Treatment Pain Comment  sacral pain with weight bearing to rle.  no pain at rest  -SC       User Key  (r) = Recorded By, (t) = Taken By, (c) = Cosigned By    Initials Name Provider Type    Ilene Shirley PTA Physical Therapy Assistant        Outcome Measures     Row Name 12/26/19 2255          How much help from another person do you currently need...    Turning from your back to your side while in flat bed without using bedrails?  2  -SC     Moving from lying on back to sitting on the side of a flat bed without bedrails?  2  -SC     Moving to and from a bed to a chair (including a wheelchair)?  3  -SC     Standing up from a chair using your arms (e.g., wheelchair, bedside chair)?  3  -SC     Climbing 3-5 steps with a railing?  1  -SC     To walk in hospital room?  2  -SC     AM-PAC 6 Clicks Score (PT)  13  -SC     Row Name 12/26/19 5151          Functional Assessment    Outcome Measure Options  AM-PAC 6 Clicks Basic Mobility (PT)  -SC       User Key  (r) = Recorded By, (t) = Taken By, (c) = Cosigned By    Initials Name Provider Type    Ilene Shirley PTA Physical Therapy Assistant          PT Recommendation and Plan     Outcome Summary/Treatment Plan (PT)  Anticipated Discharge Disposition (PT): inpatient rehabilitation facility(PT has recommended IP rehab but  has declined.  he want to take her home with HHPT)  Plan of Care Reviewed With: spouse, patient  Progress: improving  Outcome Summary: pt's  demonstrated improved ability to assist pt (his wife) with transfers.  He demonstrated correct use of gait belt to assist with transfers.  PT recommends IP rehab this time but pt's  declined due to her dementia.  pt's  reported that they now have a wheelchair and will have HHPT when they get home.     Time Calculation:   PT Charges     Row Name 12/26/19 4714             Time Calculation    Start Time  1020  -SC      Stop Time   1045  -SC      Time Calculation (min)  25 min  -SC      PT Received On  12/26/19  -SC      PT - Next Appointment  12/30/19  -SC         Time Calculation- PT    Total Timed Code Minutes- PT  25 minute(s)  -SC         Timed Charges    98936 - Gait Training Minutes   --  -SC      97640 - PT Therapeutic Activity Minutes  25  -SC        User Key  (r) = Recorded By, (t) = Taken By, (c) = Cosigned By    Initials Name Provider Type    SC Ilene Garcia, XANDER Physical Therapy Assistant        Therapy Charges for Today     Code Description Service Date Service Provider Modifiers Qty    [id number]  PT THERAPEUTIC ACT EA 15 MIN 12/26/2019 Ilene Garcia PTA GP 2          PT G-Codes  Outcome Measure Options: AM-PAC 6 Clicks Basic Mobility (PT)  AM-PAC 6 Clicks Score (PT): 13    Ilene Garcia PTA  12/26/2019

## 2019-12-26 NOTE — PROGRESS NOTES
Continued Stay Note   Denis     Patient Name: Taylor Price  MRN: [id number]  Today's Date: 12/26/2019    Admit Date: 12/22/2019    Discharge Plan     Row Name 12/26/19 1525       Plan    Plan  home with [facility], order in [facility] and liason notified.  family refused rehab    Patient/Family in Agreement with Plan  yes    Plan Comments   refusing rehab, states wants to take patient home with home health, [facility] not [facility] able to [facility] untill mid next week.  contacted [facility], [facility] and [facility] per patient choices, all unable to accept contacted [facility].  [facility] able to accept patient, MD notified            Expected Discharge Date and Time     Expected Discharge Date Expected Discharge Time    Dec 26, 2019             Rosalina Lizama RN

## 2019-12-26 NOTE — NURSING NOTE
Patients  turned off bed alarm and assisted patient to bedside commode independently. Spouse had difficulty getting her back to bed and educated spouse on the importance of letting us help her for her safety. Spouse states he understands. Bed alarm set. Will continue to monitor.

## 2019-12-26 NOTE — PLAN OF CARE
Pt is discharging today to home with home health. Did well with PT and family has been given discharge instructions.

## 2019-12-26 NOTE — THERAPY TREATMENT NOTE
Acute Care - Occupational Therapy Treatment Note   [facility]     Patient Name: Taylor Price  : 1940  MRN: [id number]  Today's Date: 2019             Admit Date: 2019       ICD-10-CM ICD-9-CM   1. Nondisplaced zone i fracture of sacrum, initial encounter for closed fracture (CMS/HCC) S32.110A 805.6   2. Fall, initial encounter W19.XXXA E888.9     Patient Active Problem List   Diagnosis   • Nondisplaced zone i fracture of sacrum, initial encounter for closed fracture (CMS/HCC)   • Hyperlipidemia   • Hypertension   • Dementia of the Alzheimer's type with late onset without behavioral disturbance (CMS/[facility])   • Osteoarthritis   • Depression   • Age-related physical debility   • Delirium     Past Medical History:   Diagnosis Date   • Alzheimer disease (CMS/[facility])    • Arthritis    • Depression    • GERD (gastroesophageal reflux disease)    • Hypertension      Past Surgical History:   Procedure Laterality Date   • HYSTERECTOMY         Therapy Treatment    Rehabilitation Treatment Summary     Row Name 19 1108             Treatment Time/Intention    Discipline  occupational therapist  -SR      Document Type  therapy note (daily note)  -SR      Subjective Information  no complaints  -SR      Recorded by [SR] Debby Burgos OT 19 4095      Row Name 19 1101             Cognitive Assessment/Intervention- PT/OT    Orientation Status (Cognition)  disoriented to;person;place;situation  -SR      Recorded by [SR] Debby Burgos OT 19 9765      Row Name 19 1102             Transfer Assessment/Treatment    Transfer Assessment/Treatment  stand pivot/stand step transfer;sit-stand transfer  -SR      Comment (Transfers)  Upon transferring to and from [facility] pt requires cues/assist for each step.  Daughter was present and participated in education.  She was advised to take gait belt home for transfers.    -SR      Recorded by [SR] Debby Burgos OT 19 1140       Row Name 12/26/19 1108             Sit-Stand Transfer    Sit-Stand [place] (Transfers)  moderate assist (50% patient effort)  -SR      Assistive Device (Sit-Stand Transfers)  walker, front-wheeled  -SR      Recorded by [SR] Debby Burgos, OT 12/26/19 1335      Row Name 12/26/19 1108             Stand Pivot/Stand Step Transfer    Stand Pivot/Stand Step [place]  moderate assist (50% patient effort)  -SR      Assistive Device (Stand Pivot Stand Step Transfer)  walker, front-wheeled  -SR      Recorded by [SR] Debby Burgos, OT 12/26/19 1335      Row Name 12/26/19 1108             ADL Assessment/Intervention    BADL Assessment/Intervention  grooming  -SR      Recorded by [SR] Debyb Burgos, OT 12/26/19 1335      Row Name 12/26/19 1108             Grooming Assessment/Training    [place] Level (Grooming)  maximum assist (25% patient effort);hair care, combing/brushing  -SR      Recorded by [SR] Debby Burgos, OT 12/26/19 1335      Row Name 12/26/19 1108             Toileting Assessment/Training    [place] Level (Toileting)  maximum assist (25% patient effort)  -SR      Recorded by [SR] Debby Burgos, OT 12/26/19 1335      Row [facility] 12/26/19 1108             Static Sitting Balance    Level of [place] (Unsupported Sitting, Static Balance)  supervision  -SR      Recorded by [SR] Debby Burgos, OT 12/26/19 1335      Row Name 12/26/19 1108             Static Standing Balance    Level of [place] (Supported Standing, Static Balance)  minimal assist, 75% patient effort  -SR      Assistive Device Utilized (Supported Standing, Static Balance)  walker, rolling  -SR      Recorded by [SR] Debby Burgos, OT 12/26/19 1335      Row Name 12/26/19 1108             Dynamic Standing Balance    Level of [place], Reaches Outside Midline (Standing, Dynamic Balance)  moderate assist, 50 to 74% patient effort  -SR      Assistive Device Utilized  (Supported Standing, Dynamic Balance)  walker, rolling  -SR      Recorded by [SR] Debby Burgos, OT 12/26/19 1335      Row Name 12/26/19 1108             Positioning and Restraints    Pre-Treatment Position  sitting in chair/recliner  -SR      Post Treatment Position  chair  -SR      In Chair  call light within reach;encouraged to call for assist;exit alarm on;with family/caregiver  -SR      Recorded by [SR] Debby Burgos, OT 12/26/19 1335      Row Name 12/26/19 1108             Pain Scale: FACES Pre/Post-Treatment    Pain: FACES Scale, Pretreatment  0-->no hurt  -SR      Pain: FACES Scale, Post-Treatment  2-->hurts little bit  -SR      Recorded by [SR] Debby Burgos, OT 12/26/19 1335      Row Name 12/26/19 1108             Plan of Care Review    Outcome Summary  Pt demonstrates improvement with mobility this date. She requires assist for all activities due to cognitive status.  Family very supportive and planning to take patient home.  Family was educated on transfer techniques.  She will require HH therapy at discharge.   -SR      Recorded by [SR] Debby Burgos, OT 12/26/19 3989      Row Name 12/26/19 1108             Outcome Summary/Treatment Plan (OT)    Daily Summary of Progress (OT)  progress towards functional goals is fair  -SR      Recorded by [SR] Debby Burgos, OT 12/26/19 133        User Key  (r) = Recorded By, (t) = Taken By, (c) = Cosigned By    Initials Name Effective Dates Discipline    SR Debby Burgos, OT 03/01/19 -  OT                 OT Recommendation and Plan  Outcome Summary/Treatment Plan (OT)  Daily Summary of Progress (OT): progress towards functional goals is fair  Anticipated Discharge Disposition (OT): inpatient rehabilitation facility  Planned Therapy Interventions (OT Eval): adaptive equipment training, BADL retraining, cognitive/visual perception retraining, functional balance retraining, occupation/activity based interventions,  transfer/mobility retraining, strengthening exercise  Therapy Frequency (OT Eval): 3 times/wk  Daily Summary of Progress (OT): progress towards functional goals is fair  Outcome Summary: Pt demonstrates improvement with mobility this date. She requires assist for all activities due to cognitive status.  Family very supportive and planning to take patient home.  Family was educated on transfer techniques.  She will require HH therapy at discharge.        Time Calculation:   Time Calculation- OT     Row Name 12/26/19 1337             Time Calculation- OT    OT Start Time  1108  -SR      OT Stop Time  1127  -SR      OT Time Calculation (min)  19 min  -SR      Total Timed Code Minutes- OT  19 minute(s)  -SR      OT Received On  12/26/19  -SR      OT - Next Appointment  12/27/19  -SR        User Key  (r) = Recorded By, (t) = Taken By, (c) = Cosigned By    Initials Name Provider Type    SR Debby Burgos OT Occupational Therapist        Therapy Charges for Today     Code Description Service Date Service Provider Modifiers Qty    [id number] HC OT SELF CARE/MGMT/TRAIN EA 15 MIN 12/26/2019 Debby Burgos OT GO 1               Debby Burgos OT  12/26/2019

## 2019-12-26 NOTE — PLAN OF CARE
Problem: Patient Care Overview  Goal: Plan of Care Review  Outcome: Ongoing (interventions implemented as appropriate)  Flowsheets (Taken 12/26/2019 1108)  Outcome Summary: Pt demonstrates improvement with mobility this date. She requires assist for all activities due to cognitive status.  Family very supportive and planning to take patient home.  Family was educated on transfer techniques.  She will require HH therapy at discharge.

## 2019-12-26 NOTE — PLAN OF CARE
Problem: Patient Care Overview  Goal: Plan of Care Review  Outcome: Ongoing (interventions implemented as appropriate)  Flowsheets (Taken 12/26/2019 142)  Progress: improving  Plan of Care Reviewed With: spouse; patient  Outcome Summary: pt's  demonstrated improved ability to assist pt (his wife) with transfers.  He demonstrated correct use of gait belt to assist with transfers.  PT recommends IP rehab this time but pt's  declined due to her dementia.  pt's  reported that they now have a wheelchair and will have HHPT when they get home.

## 2019-12-27 ENCOUNTER — READMISSION MANAGEMENT (OUTPATIENT)
Dept: CALL CENTER | Facility: HOSPITAL | Age: 79
End: 2019-12-27

## 2019-12-27 NOTE — OUTREACH NOTE
Prep Survey      Responses   Facility patient discharged from?  [facility]   Is patient eligible?  Yes   Discharge diagnosis  fall,  foot pain,  fracture of sacrum   Does the patient have one of the following disease processes/diagnoses(primary or secondary)?  Other   Does the patient have Home health ordered?  Yes   What is the Home health agency?   [facility]   Is there a DME ordered?  No   Prep survey completed?  Yes          Georgina Mosquera RN

## 2019-12-27 NOTE — PROGRESS NOTES
Case Management Discharge Note      Final Note: DC to home with [facility]    Provided post acute provider list?: Yes  Post Acute Provider Lists: Home Health, Inpatient Rehab, Nursing Home  Post Acuite Provider List: Delivered  Delivered To: Support Person  Support Person: spouse and daughter  Method of Delivery: In person          Home Medical Care      Service Provider Request Status Selected Services Address Phone Number Fax Number    [facility] [address] [address] [phone]        Final Discharge Disposition Code: 06 - home with home health care

## 2019-12-30 ENCOUNTER — READMISSION MANAGEMENT (OUTPATIENT)
Dept: CALL CENTER | Facility: HOSPITAL | Age: 79
End: 2019-12-30

## 2019-12-30 NOTE — OUTREACH NOTE
Medical Week 1 Survey      Responses   Facility patient discharged from?  [facility]   Does the patient have one of the following disease processes/diagnoses(primary or secondary)?  Other   Is there a successful TCM telephone encounter documented?  No   Week 1 attempt successful?  No   Rescheduled  Revoked   Revoke  Decline to participate [NO ANSWER, LEFT VM]          Angeles Mendoza LPN

## 2020-05-03 ENCOUNTER — APPOINTMENT (OUTPATIENT)
Dept: GENERAL RADIOLOGY | Facility: HOSPITAL | Age: 80
End: 2020-05-03

## 2020-05-03 ENCOUNTER — APPOINTMENT (OUTPATIENT)
Dept: CT IMAGING | Facility: HOSPITAL | Age: 80
End: 2020-05-03

## 2020-05-03 ENCOUNTER — HOSPITAL ENCOUNTER (INPATIENT)
Facility: HOSPITAL | Age: 80
LOS: 3 days | Discharge: HOME HEALTH | End: 2020-05-06
Attending: EMERGENCY MEDICINE | Admitting: INTERNAL MEDICINE

## 2020-05-03 DIAGNOSIS — E87.0 HYPERNATREMIA: ICD-10-CM

## 2020-05-03 DIAGNOSIS — E87.6 HYPOKALEMIA: ICD-10-CM

## 2020-05-03 DIAGNOSIS — N17.9 AKI (ACUTE KIDNEY INJURY) (HCC): ICD-10-CM

## 2020-05-03 DIAGNOSIS — N39.0 URINARY TRACT INFECTION WITHOUT HEMATURIA, SITE UNSPECIFIED: ICD-10-CM

## 2020-05-03 DIAGNOSIS — R53.1 WEAKNESS: Primary | ICD-10-CM

## 2020-05-03 DIAGNOSIS — R41.82 ALTERED MENTAL STATUS, UNSPECIFIED ALTERED MENTAL STATUS TYPE: ICD-10-CM

## 2020-05-03 DIAGNOSIS — E46 MALNUTRITION, UNSPECIFIED TYPE (HCC): Chronic | ICD-10-CM

## 2020-05-03 PROBLEM — E87.1 HYPONATREMIA: Status: ACTIVE | Noted: 2020-05-03

## 2020-05-03 LAB
ALBUMIN SERPL-MCNC: 4.2 G/DL (ref 3.5–5.2)
ALBUMIN/GLOB SERPL: 1.3 G/DL
ALP SERPL-CCNC: 72 U/L (ref 39–117)
ALT SERPL W P-5'-P-CCNC: 48 U/L (ref 1–33)
ANION GAP SERPL CALCULATED.3IONS-SCNC: 15 MMOL/L (ref 5–15)
AST SERPL-CCNC: 55 U/L (ref 1–32)
BACTERIA UR QL AUTO: ABNORMAL /HPF
BASOPHILS # BLD AUTO: 0.1 10*3/MM3 (ref 0–0.2)
BASOPHILS NFR BLD AUTO: 0.6 % (ref 0–1.5)
BILIRUB SERPL-MCNC: 0.5 MG/DL (ref 0.2–1.2)
BILIRUB UR QL STRIP: NEGATIVE
BUN BLD-MCNC: 36 MG/DL (ref 8–23)
BUN/CREAT SERPL: 33.3 (ref 7–25)
CALCIUM SPEC-SCNC: 10 MG/DL (ref 8.6–10.5)
CHLORIDE SERPL-SCNC: 113 MMOL/L (ref 98–107)
CLARITY UR: CLEAR
CO2 SERPL-SCNC: 23 MMOL/L (ref 22–29)
COLOR UR: ABNORMAL
CREAT BLD-MCNC: 1.08 MG/DL (ref 0.57–1)
DEPRECATED RDW RBC AUTO: 45.5 FL (ref 37–54)
EOSINOPHIL # BLD AUTO: 0 10*3/MM3 (ref 0–0.4)
EOSINOPHIL NFR BLD AUTO: 0.1 % (ref 0.3–6.2)
ERYTHROCYTE [DISTWIDTH] IN BLOOD BY AUTOMATED COUNT: 14.1 % (ref 12.3–15.4)
GFR SERPL CREATININE-BSD FRML MDRD: 49 ML/MIN/1.73
GLOBULIN UR ELPH-MCNC: 3.3 GM/DL
GLUCOSE BLD-MCNC: 118 MG/DL (ref 65–99)
GLUCOSE UR STRIP-MCNC: NEGATIVE MG/DL
HCT VFR BLD AUTO: 40.9 % (ref 34–46.6)
HGB BLD-MCNC: 13.9 G/DL (ref 12–15.9)
HGB UR QL STRIP.AUTO: NEGATIVE
HYALINE CASTS UR QL AUTO: ABNORMAL /LPF
KETONES UR QL STRIP: ABNORMAL
LEUKOCYTE ESTERASE UR QL STRIP.AUTO: ABNORMAL
LIPASE SERPL-CCNC: 88 U/L (ref 13–60)
LYMPHOCYTES # BLD AUTO: 1.9 10*3/MM3 (ref 0.7–3.1)
LYMPHOCYTES NFR BLD AUTO: 18.3 % (ref 19.6–45.3)
MAGNESIUM SERPL-MCNC: 2.5 MG/DL (ref 1.6–2.4)
MCH RBC QN AUTO: 31.1 PG (ref 26.6–33)
MCHC RBC AUTO-ENTMCNC: 34.1 G/DL (ref 31.5–35.7)
MCV RBC AUTO: 91.2 FL (ref 79–97)
MONOCYTES # BLD AUTO: 0.7 10*3/MM3 (ref 0.1–0.9)
MONOCYTES NFR BLD AUTO: 6.6 % (ref 5–12)
MUCOUS THREADS URNS QL MICRO: ABNORMAL /HPF
NEUTROPHILS # BLD AUTO: 7.6 10*3/MM3 (ref 1.7–7)
NEUTROPHILS NFR BLD AUTO: 74.4 % (ref 42.7–76)
NITRITE UR QL STRIP: NEGATIVE
NRBC BLD AUTO-RTO: 0.1 /100 WBC (ref 0–0.2)
NT-PROBNP SERPL-MCNC: 453.9 PG/ML (ref 5–1800)
PH UR STRIP.AUTO: 6 [PH] (ref 5–8)
PLATELET # BLD AUTO: 283 10*3/MM3 (ref 140–450)
PMV BLD AUTO: 8.5 FL (ref 6–12)
POTASSIUM BLD-SCNC: 2.7 MMOL/L (ref 3.5–5.2)
POTASSIUM BLD-SCNC: 3 MMOL/L (ref 3.5–5.2)
PROT SERPL-MCNC: 7.5 G/DL (ref 6–8.5)
PROT UR QL STRIP: ABNORMAL
RBC # BLD AUTO: 4.49 10*6/MM3 (ref 3.77–5.28)
RBC # UR: ABNORMAL /HPF
REF LAB TEST METHOD: ABNORMAL
SODIUM BLD-SCNC: 151 MMOL/L (ref 136–145)
SP GR UR STRIP: 1.03 (ref 1–1.03)
SQUAMOUS #/AREA URNS HPF: ABNORMAL /HPF
TRANS CELLS #/AREA URNS HPF: ABNORMAL /HPF
TROPONIN T SERPL-MCNC: <0.01 NG/ML (ref 0–0.03)
TROPONIN T SERPL-MCNC: <0.01 NG/ML (ref 0–0.03)
TSH SERPL DL<=0.05 MIU/L-ACNC: 1.35 UIU/ML (ref 0.27–4.2)
UROBILINOGEN UR QL STRIP: ABNORMAL
WBC NRBC COR # BLD: 10.2 10*3/MM3 (ref 3.4–10.8)
WBC UR QL AUTO: ABNORMAL /HPF

## 2020-05-03 PROCEDURE — 99285 EMERGENCY DEPT VISIT HI MDM: CPT

## 2020-05-03 PROCEDURE — 70450 CT HEAD/BRAIN W/O DYE: CPT

## 2020-05-03 PROCEDURE — 84132 ASSAY OF SERUM POTASSIUM: CPT | Performed by: PHYSICIAN ASSISTANT

## 2020-05-03 PROCEDURE — 99222 1ST HOSP IP/OBS MODERATE 55: CPT | Performed by: PHYSICIAN ASSISTANT

## 2020-05-03 PROCEDURE — 84443 ASSAY THYROID STIM HORMONE: CPT | Performed by: EMERGENCY MEDICINE

## 2020-05-03 PROCEDURE — 84484 ASSAY OF TROPONIN QUANT: CPT | Performed by: EMERGENCY MEDICINE

## 2020-05-03 PROCEDURE — 71045 X-RAY EXAM CHEST 1 VIEW: CPT

## 2020-05-03 PROCEDURE — 25010000002 CEFTRIAXONE PER 250 MG: Performed by: EMERGENCY MEDICINE

## 2020-05-03 PROCEDURE — 83690 ASSAY OF LIPASE: CPT | Performed by: EMERGENCY MEDICINE

## 2020-05-03 PROCEDURE — 83880 ASSAY OF NATRIURETIC PEPTIDE: CPT | Performed by: PHYSICIAN ASSISTANT

## 2020-05-03 PROCEDURE — 83735 ASSAY OF MAGNESIUM: CPT | Performed by: EMERGENCY MEDICINE

## 2020-05-03 PROCEDURE — 84484 ASSAY OF TROPONIN QUANT: CPT | Performed by: PHYSICIAN ASSISTANT

## 2020-05-03 PROCEDURE — 81001 URINALYSIS AUTO W/SCOPE: CPT | Performed by: EMERGENCY MEDICINE

## 2020-05-03 PROCEDURE — 74176 CT ABD & PELVIS W/O CONTRAST: CPT

## 2020-05-03 PROCEDURE — 80053 COMPREHEN METABOLIC PANEL: CPT | Performed by: EMERGENCY MEDICINE

## 2020-05-03 PROCEDURE — 25010000002 ENOXAPARIN PER 10 MG: Performed by: PHYSICIAN ASSISTANT

## 2020-05-03 PROCEDURE — 85025 COMPLETE CBC W/AUTO DIFF WBC: CPT | Performed by: EMERGENCY MEDICINE

## 2020-05-03 PROCEDURE — 87086 URINE CULTURE/COLONY COUNT: CPT | Performed by: EMERGENCY MEDICINE

## 2020-05-03 PROCEDURE — 93005 ELECTROCARDIOGRAM TRACING: CPT | Performed by: EMERGENCY MEDICINE

## 2020-05-03 PROCEDURE — P9612 CATHETERIZE FOR URINE SPEC: HCPCS

## 2020-05-03 RX ORDER — MAGNESIUM SULFATE HEPTAHYDRATE 40 MG/ML
2 INJECTION, SOLUTION INTRAVENOUS AS NEEDED
Status: DISCONTINUED | OUTPATIENT
Start: 2020-05-03 | End: 2020-05-06 | Stop reason: HOSPADM

## 2020-05-03 RX ORDER — ONDANSETRON 2 MG/ML
4 INJECTION INTRAMUSCULAR; INTRAVENOUS EVERY 6 HOURS PRN
Status: DISCONTINUED | OUTPATIENT
Start: 2020-05-03 | End: 2020-05-06 | Stop reason: HOSPADM

## 2020-05-03 RX ORDER — POTASSIUM CHLORIDE 20 MEQ/1
40 TABLET, EXTENDED RELEASE ORAL AS NEEDED
Status: DISCONTINUED | OUTPATIENT
Start: 2020-05-03 | End: 2020-05-06 | Stop reason: HOSPADM

## 2020-05-03 RX ORDER — LISINOPRIL 20 MG/1
40 TABLET ORAL
Status: DISCONTINUED | OUTPATIENT
Start: 2020-05-04 | End: 2020-05-03

## 2020-05-03 RX ORDER — SODIUM CHLORIDE 0.9 % (FLUSH) 0.9 %
10 SYRINGE (ML) INJECTION AS NEEDED
Status: DISCONTINUED | OUTPATIENT
Start: 2020-05-03 | End: 2020-05-06 | Stop reason: HOSPADM

## 2020-05-03 RX ORDER — ASPIRIN 81 MG/1
81 TABLET, CHEWABLE ORAL DAILY
COMMUNITY

## 2020-05-03 RX ORDER — ATORVASTATIN CALCIUM 20 MG/1
20 TABLET, FILM COATED ORAL DAILY
Status: DISCONTINUED | OUTPATIENT
Start: 2020-05-04 | End: 2020-05-06 | Stop reason: HOSPADM

## 2020-05-03 RX ORDER — HYDRALAZINE HYDROCHLORIDE 20 MG/ML
10 INJECTION INTRAMUSCULAR; INTRAVENOUS EVERY 6 HOURS PRN
Status: DISCONTINUED | OUTPATIENT
Start: 2020-05-03 | End: 2020-05-06 | Stop reason: HOSPADM

## 2020-05-03 RX ORDER — PANTOPRAZOLE SODIUM 40 MG/1
40 TABLET, DELAYED RELEASE ORAL EVERY MORNING
Status: DISCONTINUED | OUTPATIENT
Start: 2020-05-04 | End: 2020-05-03

## 2020-05-03 RX ORDER — POTASSIUM CHLORIDE 1.5 G/1.77G
40 POWDER, FOR SOLUTION ORAL AS NEEDED
Status: DISCONTINUED | OUTPATIENT
Start: 2020-05-03 | End: 2020-05-06 | Stop reason: HOSPADM

## 2020-05-03 RX ORDER — SODIUM CHLORIDE 0.9 % (FLUSH) 0.9 %
10 SYRINGE (ML) INJECTION EVERY 12 HOURS SCHEDULED
Status: DISCONTINUED | OUTPATIENT
Start: 2020-05-03 | End: 2020-05-06 | Stop reason: HOSPADM

## 2020-05-03 RX ORDER — DEXTROSE AND SODIUM CHLORIDE 5; .45 G/100ML; G/100ML
100 INJECTION, SOLUTION INTRAVENOUS CONTINUOUS
Status: DISCONTINUED | OUTPATIENT
Start: 2020-05-03 | End: 2020-05-04

## 2020-05-03 RX ORDER — CHOLECALCIFEROL (VITAMIN D3) 125 MCG
5 CAPSULE ORAL NIGHTLY PRN
Status: DISCONTINUED | OUTPATIENT
Start: 2020-05-03 | End: 2020-05-06 | Stop reason: HOSPADM

## 2020-05-03 RX ORDER — ACETAMINOPHEN 160 MG/5ML
650 SOLUTION ORAL EVERY 4 HOURS PRN
Status: DISCONTINUED | OUTPATIENT
Start: 2020-05-03 | End: 2020-05-06 | Stop reason: HOSPADM

## 2020-05-03 RX ORDER — CALCIUM GLUCONATE 20 MG/ML
2 INJECTION, SOLUTION INTRAVENOUS AS NEEDED
Status: DISCONTINUED | OUTPATIENT
Start: 2020-05-03 | End: 2020-05-06 | Stop reason: HOSPADM

## 2020-05-03 RX ORDER — PRAVASTATIN SODIUM 80 MG/1
80 TABLET ORAL DAILY
COMMUNITY

## 2020-05-03 RX ORDER — CITALOPRAM 20 MG/1
20 TABLET ORAL DAILY
Status: DISCONTINUED | OUTPATIENT
Start: 2020-05-04 | End: 2020-05-06 | Stop reason: HOSPADM

## 2020-05-03 RX ORDER — MAGNESIUM SULFATE HEPTAHYDRATE 40 MG/ML
4 INJECTION, SOLUTION INTRAVENOUS AS NEEDED
Status: DISCONTINUED | OUTPATIENT
Start: 2020-05-03 | End: 2020-05-06 | Stop reason: HOSPADM

## 2020-05-03 RX ORDER — ACETAMINOPHEN 650 MG/1
650 SUPPOSITORY RECTAL EVERY 4 HOURS PRN
Status: DISCONTINUED | OUTPATIENT
Start: 2020-05-03 | End: 2020-05-06 | Stop reason: HOSPADM

## 2020-05-03 RX ORDER — ASPIRIN 81 MG/1
81 TABLET, CHEWABLE ORAL DAILY
Status: DISCONTINUED | OUTPATIENT
Start: 2020-05-04 | End: 2020-05-06 | Stop reason: HOSPADM

## 2020-05-03 RX ORDER — NITROGLYCERIN 0.4 MG/1
0.4 TABLET SUBLINGUAL
Status: DISCONTINUED | OUTPATIENT
Start: 2020-05-03 | End: 2020-05-06 | Stop reason: HOSPADM

## 2020-05-03 RX ORDER — DONEPEZIL HYDROCHLORIDE 5 MG/1
10 TABLET, FILM COATED ORAL NIGHTLY
Status: DISCONTINUED | OUTPATIENT
Start: 2020-05-03 | End: 2020-05-06 | Stop reason: HOSPADM

## 2020-05-03 RX ORDER — DOCUSATE SODIUM 100 MG/1
100 CAPSULE, LIQUID FILLED ORAL 2 TIMES DAILY PRN
Status: DISCONTINUED | OUTPATIENT
Start: 2020-05-03 | End: 2020-05-06 | Stop reason: HOSPADM

## 2020-05-03 RX ORDER — ALUMINA, MAGNESIA, AND SIMETHICONE 2400; 2400; 240 MG/30ML; MG/30ML; MG/30ML
15 SUSPENSION ORAL EVERY 6 HOURS PRN
Status: DISCONTINUED | OUTPATIENT
Start: 2020-05-03 | End: 2020-05-06 | Stop reason: HOSPADM

## 2020-05-03 RX ORDER — ACETAMINOPHEN 325 MG/1
650 TABLET ORAL EVERY 4 HOURS PRN
Status: DISCONTINUED | OUTPATIENT
Start: 2020-05-03 | End: 2020-05-06 | Stop reason: HOSPADM

## 2020-05-03 RX ORDER — ONDANSETRON 4 MG/1
4 TABLET, FILM COATED ORAL EVERY 6 HOURS PRN
Status: DISCONTINUED | OUTPATIENT
Start: 2020-05-03 | End: 2020-05-06 | Stop reason: HOSPADM

## 2020-05-03 RX ORDER — CALCIUM GLUCONATE 20 MG/ML
1 INJECTION, SOLUTION INTRAVENOUS AS NEEDED
Status: DISCONTINUED | OUTPATIENT
Start: 2020-05-03 | End: 2020-05-06 | Stop reason: HOSPADM

## 2020-05-03 RX ORDER — AMLODIPINE BESYLATE 5 MG/1
2.5 TABLET ORAL DAILY
Status: DISCONTINUED | OUTPATIENT
Start: 2020-05-04 | End: 2020-05-04

## 2020-05-03 RX ADMIN — CEFTRIAXONE SODIUM 1 G: 10 INJECTION, POWDER, FOR SOLUTION INTRAVENOUS at 20:44

## 2020-05-03 RX ADMIN — DEXTROSE AND SODIUM CHLORIDE 100 ML/HR: 5; .45 INJECTION, SOLUTION INTRAVENOUS at 23:15

## 2020-05-03 RX ADMIN — ENOXAPARIN SODIUM 40 MG: 40 INJECTION SUBCUTANEOUS at 23:16

## 2020-05-03 RX ADMIN — DONEPEZIL HYDROCHLORIDE 10 MG: 5 TABLET, FILM COATED ORAL at 23:16

## 2020-05-03 RX ADMIN — SODIUM CHLORIDE 1000 ML: 0.9 INJECTION, SOLUTION INTRAVENOUS at 17:45

## 2020-05-03 RX ADMIN — Medication 10 ML: at 22:30

## 2020-05-04 LAB
ANION GAP SERPL CALCULATED.3IONS-SCNC: 13 MMOL/L (ref 5–15)
BASOPHILS # BLD AUTO: 0 10*3/MM3 (ref 0–0.2)
BASOPHILS NFR BLD AUTO: 0.3 % (ref 0–1.5)
BUN BLD-MCNC: 35 MG/DL (ref 8–23)
BUN/CREAT SERPL: 42.7 (ref 7–25)
CALCIUM SPEC-SCNC: 9.2 MG/DL (ref 8.6–10.5)
CHLORIDE SERPL-SCNC: 115 MMOL/L (ref 98–107)
CHOLEST SERPL-MCNC: 283 MG/DL (ref 0–200)
CK SERPL-CCNC: 177 U/L (ref 20–180)
CO2 SERPL-SCNC: 25 MMOL/L (ref 22–29)
CREAT BLD-MCNC: 0.82 MG/DL (ref 0.57–1)
DEPRECATED RDW RBC AUTO: 45.9 FL (ref 37–54)
EOSINOPHIL # BLD AUTO: 0.1 10*3/MM3 (ref 0–0.4)
EOSINOPHIL NFR BLD AUTO: 0.9 % (ref 0.3–6.2)
ERYTHROCYTE [DISTWIDTH] IN BLOOD BY AUTOMATED COUNT: 14.1 % (ref 12.3–15.4)
GFR SERPL CREATININE-BSD FRML MDRD: 67 ML/MIN/1.73
GLUCOSE BLD-MCNC: 124 MG/DL (ref 65–99)
HBA1C MFR BLD: 5 % (ref 3.5–5.6)
HCT VFR BLD AUTO: 38.2 % (ref 34–46.6)
HDLC SERPL-MCNC: 57 MG/DL (ref 40–60)
HGB BLD-MCNC: 12.9 G/DL (ref 12–15.9)
LDLC SERPL CALC-MCNC: 200 MG/DL (ref 0–100)
LDLC/HDLC SERPL: 3.5 {RATIO}
LYMPHOCYTES # BLD AUTO: 2.6 10*3/MM3 (ref 0.7–3.1)
LYMPHOCYTES NFR BLD AUTO: 24.7 % (ref 19.6–45.3)
MAGNESIUM SERPL-MCNC: 2.3 MG/DL (ref 1.6–2.4)
MCH RBC QN AUTO: 31 PG (ref 26.6–33)
MCHC RBC AUTO-ENTMCNC: 33.7 G/DL (ref 31.5–35.7)
MCV RBC AUTO: 92 FL (ref 79–97)
MONOCYTES # BLD AUTO: 0.7 10*3/MM3 (ref 0.1–0.9)
MONOCYTES NFR BLD AUTO: 6.4 % (ref 5–12)
NEUTROPHILS # BLD AUTO: 7.3 10*3/MM3 (ref 1.7–7)
NEUTROPHILS NFR BLD AUTO: 67.7 % (ref 42.7–76)
NRBC BLD AUTO-RTO: 0.1 /100 WBC (ref 0–0.2)
PHOSPHATE SERPL-MCNC: 1.7 MG/DL (ref 2.5–4.5)
PLATELET # BLD AUTO: 242 10*3/MM3 (ref 140–450)
PMV BLD AUTO: 9 FL (ref 6–12)
POTASSIUM BLD-SCNC: 2.6 MMOL/L (ref 3.5–5.2)
RBC # BLD AUTO: 4.15 10*6/MM3 (ref 3.77–5.28)
SODIUM BLD-SCNC: 153 MMOL/L (ref 136–145)
TRIGL SERPL-MCNC: 132 MG/DL (ref 0–150)
TROPONIN T SERPL-MCNC: <0.01 NG/ML (ref 0–0.03)
TROPONIN T SERPL-MCNC: <0.01 NG/ML (ref 0–0.03)
VIT B12 BLD-MCNC: 950 PG/ML (ref 211–946)
VLDLC SERPL-MCNC: 26.4 MG/DL
WBC NRBC COR # BLD: 10.7 10*3/MM3 (ref 3.4–10.8)

## 2020-05-04 PROCEDURE — 80048 BASIC METABOLIC PNL TOTAL CA: CPT | Performed by: PHYSICIAN ASSISTANT

## 2020-05-04 PROCEDURE — 97530 THERAPEUTIC ACTIVITIES: CPT

## 2020-05-04 PROCEDURE — 82607 VITAMIN B-12: CPT | Performed by: PHYSICIAN ASSISTANT

## 2020-05-04 PROCEDURE — 97166 OT EVAL MOD COMPLEX 45 MIN: CPT

## 2020-05-04 PROCEDURE — 25010000002 CEFTRIAXONE PER 250 MG: Performed by: INTERNAL MEDICINE

## 2020-05-04 PROCEDURE — 84100 ASSAY OF PHOSPHORUS: CPT | Performed by: PHYSICIAN ASSISTANT

## 2020-05-04 PROCEDURE — 97162 PT EVAL MOD COMPLEX 30 MIN: CPT

## 2020-05-04 PROCEDURE — 84484 ASSAY OF TROPONIN QUANT: CPT | Performed by: PHYSICIAN ASSISTANT

## 2020-05-04 PROCEDURE — 83735 ASSAY OF MAGNESIUM: CPT | Performed by: PHYSICIAN ASSISTANT

## 2020-05-04 PROCEDURE — 99233 SBSQ HOSP IP/OBS HIGH 50: CPT | Performed by: INTERNAL MEDICINE

## 2020-05-04 PROCEDURE — 80061 LIPID PANEL: CPT | Performed by: PHYSICIAN ASSISTANT

## 2020-05-04 PROCEDURE — 83036 HEMOGLOBIN GLYCOSYLATED A1C: CPT | Performed by: PHYSICIAN ASSISTANT

## 2020-05-04 PROCEDURE — 85025 COMPLETE CBC W/AUTO DIFF WBC: CPT | Performed by: PHYSICIAN ASSISTANT

## 2020-05-04 PROCEDURE — 25010000002 POTASSIUM CHLORIDE PER 2 MEQ OF POTASSIUM: Performed by: INTERNAL MEDICINE

## 2020-05-04 PROCEDURE — 82550 ASSAY OF CK (CPK): CPT | Performed by: PHYSICIAN ASSISTANT

## 2020-05-04 PROCEDURE — 92610 EVALUATE SWALLOWING FUNCTION: CPT

## 2020-05-04 RX ORDER — POTASSIUM CHLORIDE 1.5 G/1.77G
40 POWDER, FOR SOLUTION ORAL AS NEEDED
Status: DISCONTINUED | OUTPATIENT
Start: 2020-05-04 | End: 2020-05-04 | Stop reason: SDUPTHER

## 2020-05-04 RX ORDER — POTASSIUM CHLORIDE 20 MEQ/1
40 TABLET, EXTENDED RELEASE ORAL AS NEEDED
Status: DISCONTINUED | OUTPATIENT
Start: 2020-05-04 | End: 2020-05-04 | Stop reason: SDUPTHER

## 2020-05-04 RX ORDER — POTASSIUM CHLORIDE, DEXTROSE MONOHYDRATE 150; 5 MG/100ML; G/100ML
75 INJECTION, SOLUTION INTRAVENOUS CONTINUOUS
Status: DISCONTINUED | OUTPATIENT
Start: 2020-05-04 | End: 2020-05-04

## 2020-05-04 RX ORDER — MAGNESIUM SULFATE HEPTAHYDRATE 40 MG/ML
2 INJECTION, SOLUTION INTRAVENOUS AS NEEDED
Status: DISCONTINUED | OUTPATIENT
Start: 2020-05-04 | End: 2020-05-04 | Stop reason: SDUPTHER

## 2020-05-04 RX ORDER — MAGNESIUM SULFATE HEPTAHYDRATE 40 MG/ML
4 INJECTION, SOLUTION INTRAVENOUS AS NEEDED
Status: DISCONTINUED | OUTPATIENT
Start: 2020-05-04 | End: 2020-05-04 | Stop reason: SDUPTHER

## 2020-05-04 RX ORDER — AMLODIPINE BESYLATE 5 MG/1
5 TABLET ORAL DAILY
Status: DISCONTINUED | OUTPATIENT
Start: 2020-05-05 | End: 2020-05-06

## 2020-05-04 RX ADMIN — POTASSIUM CHLORIDE 40 MEQ: 1.5 POWDER, FOR SOLUTION ORAL at 02:00

## 2020-05-04 RX ADMIN — Medication 10 ML: at 20:56

## 2020-05-04 RX ADMIN — POTASSIUM CHLORIDE 75 ML/HR: 149 INJECTION, SOLUTION, CONCENTRATE INTRAVENOUS at 11:13

## 2020-05-04 RX ADMIN — DONEPEZIL HYDROCHLORIDE 10 MG: 5 TABLET, FILM COATED ORAL at 20:56

## 2020-05-04 RX ADMIN — CEFTRIAXONE SODIUM 1 G: 1 INJECTION, POWDER, FOR SOLUTION INTRAMUSCULAR; INTRAVENOUS at 20:56

## 2020-05-04 RX ADMIN — Medication 10 ML: at 08:41

## 2020-05-04 RX ADMIN — POTASSIUM CHLORIDE 40 MEQ: 1.5 POWDER, FOR SOLUTION ORAL at 05:35

## 2020-05-04 NOTE — THERAPY EVALUATION
Acute Care - Speech Language Pathology   Swallow Initial Evaluation  [facility]     Patient Name: Taylor Price  : 1940  MRN: [id number]  Today's Date: 2020               Admit Date: 5/3/2020    Visit Dx:     ICD-10-CM ICD-9-CM   1. Weakness R53.1 780.79   2. Urinary tract infection without hematuria, site unspecified N39.0 599.0   3. Hypokalemia E87.6 276.8   4. Altered mental status, unspecified altered mental status type R41.82 780.97     Patient Active Problem List   Diagnosis   • Nondisplaced zone i fracture of sacrum, initial encounter for closed fracture (CMS/[facility])   • Hyperlipidemia   • Hypertension   • Dementia of the Alzheimer's type with late onset without behavioral disturbance (CMS/[facility])   • Osteoarthritis   • Depression   • Age-related physical debility   • Delirium   • GERD (gastroesophageal reflux disease)   • Alzheimer disease (CMS/[facility])   • Weakness   • Hypernatremia   • CORNELIO (acute kidney injury) (CMS/[facility])   • Malnutrition (CMS/[facility])   • Hypokalemia     Past Medical History:   Diagnosis Date   • Alzheimer disease (CMS/[facility])    • Arthritis    • Depression    • GERD (gastroesophageal reflux disease)    • Hypertension      Past Surgical History:   Procedure Laterality Date   • HYSTERECTOMY          SWALLOW EVALUATION (last 72 hours)      SLP Adult Swallow Evaluation     Row Name 20 1200       Rehab Evaluation    Document Type  evaluation  -MM    Subjective Information  no complaints  -MM    Patient Observations  alert;cooperative;agree to therapy  -MM    Patient/Family Observations  Patient is verbal and answers yes no questions.  Sometimes she will answer questions in a phrase or have appropriate sponanteous speech  -MM    Patient Effort  good  -MM    Comment  Per report patient has been refusing majority of PO at home and pushing it out of mouth.  Patient was accepting of all PO trials on this date  -MM       General Information    Patient Profile Reviewed  yes  -MM    Pertinent History  Of Current Problem  Patient with PMH of Alzheimers, depression, GERD, HLD, HTN presents to [facility] after family noted she was having increasing weakness and failure to thrive.  Per family patient has lost 65 pounds since December.  Patient has been unwilling to move from recliner and when attempted to given p.o. will either push it away or spit it out.    -MM    Current Method of Nutrition  NPO  -MM    Prior Level of Function-Swallowing  no diet consistency restrictions  -MM    Plans/Goals Discussed with  patient  -MM       Oral Motor and Function    Dentition Assessment  natural, present and adequate  -MM    Secretion Management  WNL/WFL  -MM    Mucosal Quality  moist, healthy  -MM       Oral Musculature and Cranial Nerve Assessment    Oral Motor General Assessment  WFL  -MM       General Eating/Swallowing Observations    Respiratory Support Currently in Use  room air  -MM    Eating/Swallowing Skills  fed by SLP  -MM    Positioning During Eating  upright in bed;upright 90 degree  -MM    Utensils Used  spoon;straw  -MM    Consistencies Trialed  regular textures;chopped;pureed;thin liquids  -MM       Respiratory    Respiratory Status  WFL  -MM       Clinical Swallow Eval    Oral Prep Phase  impaired  -MM    Oral Transit  WFL  -MM    Oral Residue  WFL  -MM    Pharyngeal Phase  no overt signs/symptoms of pharyngeal impairment  -MM    Clinical Swallow Evaluation Summary  Patient accepting of trial of thin water by straw, applesauce, peaches and cracker.  Slow but functional mastication noted for peaches and cracker.  Minimal oral residue post swallow.  Patient takes multiple consecutive sips of water by straw no coughing, throat clearing or wet vocal quality following.  Timely oral transit noted for all consistencies.  No overt s/s of aspiration with any trial.  Recommend a soft to chew and thin liquid diet.  -MM       Clinical Impression    SLP Swallowing Diagnosis  functional oral phase;functional pharyngeal phase  -MM     Functional Impact  no impact on function  -MM    Rehab Potential/Prognosis, Swallowing  good, to achieve stated therapy goals  -MM    Swallow Criteria for Skilled Therapeutic Interventions Met  demonstrates skilled criteria  -MM       Recommendations    Therapy Frequency (Swallow)  PRN  -MM    Predicted Duration Therapy Intervention (Days)  until discharge  -MM    SLP Diet Recommendation  soft textures;thin liquids  -MM    Recommended Precautions and Strategies  upright posture during/after eating;small bites of food and sips of liquid  -MM    Monitor for Signs of Aspiration  yes;notify SLP if any concerns;cough  -MM       Swallow Goals (SLP)    Oral Nutrition/Hydration Goal Selection (SLP)  oral nutrition/hydration, SLP goal 1;oral nutrition/hydration, SLP goal 2  -MM       Oral Nutrition/Hydration Goal 1 (SLP)    Oral Nutrition/Hydration Goal 1, SLP  Patient will be seen at a meal within 24-48 hours to assess tolerance of current diet with further recommendations to be given as indicated.  -MM    Time Frame (Oral Nutrition/Hydration Goal 1, SLP)  2 days  -MM       Oral Nutrition/Hydration Goal 2 (SLP)    Oral Nutrition/Hydration Goal 2, SLP  Patient will tolearting safest and least restrictive diet with no complications from aspiration.   -MM    Time Frame (Oral Nutrition/Hydration Goal 2, SLP)  by discharge  -MM      User Key  (r) = Recorded By, (t) = Taken By, (c) = Cosigned By    Initials Name Effective Dates    MM Julita Wren, PHILL 03/01/19 -           EDUCATION  The patient has been educated in the following areas:   Modified Diet Instruction.    SLP Recommendation and Plan  SLP Swallowing Diagnosis: functional oral phase, functional pharyngeal phase  SLP Diet Recommendation: soft textures, thin liquids  Recommended Precautions and Strategies: upright posture during/after eating, small bites of food and sips of liquid        Monitor for Signs of Aspiration: yes, notify SLP if any concerns, cough      Swallow Criteria for Skilled Therapeutic Interventions Met: demonstrates skilled criteria     Rehab Potential/Prognosis, Swallowing: good, to achieve stated therapy goals  Therapy Frequency (Swallow): PRN  Predicted Duration Therapy Intervention (Days): until discharge            SLP GOALS     Row Name 05/04/20 1200       Oral Nutrition/Hydration Goal 1 (SLP)    Oral Nutrition/Hydration Goal 1, SLP  Patient will be seen at a meal within 24-48 hours to assess tolerance of current diet with further recommendations to be given as indicated.  -MM    Time Frame (Oral Nutrition/Hydration Goal 1, SLP)  2 days  -MM       Oral Nutrition/Hydration Goal 2 (SLP)    Oral Nutrition/Hydration Goal 2, SLP  Patient will tolearting safest and least restrictive diet with no complications from aspiration.   -MM    Time Frame (Oral Nutrition/Hydration Goal 2, SLP)  by discharge  -MM      User Key  (r) = Recorded By, (t) = Taken By, (c) = Cosigned By    Initials Name Provider Type    Julita Gray SLP Speech and Language Pathologist             Time Calculation:       Therapy Charges for Today     Code Description Service Date Service Provider Modifiers Qty    [id number]  ST EVAL ORAL PHARYNG SWALLOW 4 5/4/2020 Julita Wren SLP GN 1               PHILL Stratton  5/4/2020

## 2020-05-04 NOTE — THERAPY EVALUATION
Patient Name: Taylor Price  : 1940    MRN: [id number]                              Today's Date: 2020       Admit Date: 5/3/2020    Visit Dx:     ICD-10-CM ICD-9-CM   1. Weakness R53.1 780.79   2. Urinary tract infection without hematuria, site unspecified N39.0 599.0   3. Hypokalemia E87.6 276.8   4. Altered mental status, unspecified altered mental status type R41.82 780.97     Patient Active Problem List   Diagnosis   • Nondisplaced zone i fracture of sacrum, initial encounter for closed fracture (CMS/Formerly [facility])   • Hyperlipidemia   • Hypertension   • Dementia of the Alzheimer's type with late onset without behavioral disturbance (CMS/Formerly [facility])   • Osteoarthritis   • Depression   • Age-related physical debility   • Delirium   • GERD (gastroesophageal reflux disease)   • Alzheimer disease (CMS/Formerly [facility])   • Weakness   • Hypernatremia   • CORNELIO (acute kidney injury) (CMS/Formerly [facility])   • Malnutrition (CMS/Formerly [facility])   • Hypokalemia     Past Medical History:   Diagnosis Date   • Alzheimer disease (CMS/[facility]ly [facility])    • Arthritis    • Depression    • GERD (gastroesophageal reflux disease)    • Hypertension      Past Surgical History:   Procedure Laterality Date   • HYSTERECTOMY       General Information     Row Name 20 1257          PT Evaluation Time/Intention    Document Type  evaluation  -HC     Mode of Treatment  physical therapy  -HC     Row Name 20 1257          General Information    Patient Profile Reviewed?  yes  -HC     Prior Level of Function  -- pt with baseline dementia, husbands assists with ADLs at home, pt was ambulating with SUP without AD  -HC     Existing Precautions/Restrictions  fall confusion  -HC     Row Name 20 1257          Relationship/Environment    Lives With  spouse spouse reports he is available    -HC     Row Name 20 1257          Resource/Environmental Concerns    Current Living Arrangements  home/apartment/condo  -HC     Row Name 20 1257          Home Main Entrance    Number  of Stairs, Main Entrance  three  -     Row Name 05/04/20 1257          Cognitive Assessment/Intervention- PT/OT    Orientation Status (Cognition)  disoriented to;person;place;situation;time  -     Cognitive Assessment/Intervention Comment  able to state first name only  -     Row Name 05/04/20 1257          Safety Issues, Functional Mobility    Safety Issues Affecting Function (Mobility)  insight into deficits/self awareness;judgment;safety precaution awareness;problem solving;impulsivity  -     Impairments Affecting Function (Mobility)  balance;cognition;postural/trunk control;endurance/activity tolerance;strength  -       User Key  (r) = Recorded By, (t) = Taken By, (c) = Cosigned By    Initials Name Provider Type     Love Camacho, PT Physical Therapist        Mobility     Row Name 05/04/20 1258          Bed Mobility Assessment/Treatment    Bed Mobility Assessment/Treatment  supine-sit  -     Supine-Sit [place] (Bed Mobility)  minimum assist (75% patient effort)  -     Row Name 05/04/20 1258          Bed-Chair Transfer    Bed-Chair [place] (Transfers)  minimum assist (75% patient effort)  -     Row Name 05/04/20 1258          Sit-Stand Transfer    Sit-Stand [place] (Transfers)  minimum assist (75% patient effort)  -     Row Name 05/04/20 1258          Gait/Stairs Assessment/Training    [place] Level (Gait)  unable to assess  -       User Key  (r) = Recorded By, (t) = Taken By, (c) = Cosigned By    Initials Name Provider Type     Love Camacho, PT Physical Therapist        Obj/Interventions     Row Name 05/04/20 1258          General ROM    GENERAL ROM COMMENTS  PROM BUEs WFL, PROM BLEs WFL  -     Row Name 05/04/20 1258          MMT (Manual Muscle Testing)    General MMT Comments  unable to formally test due to impaired cognition, BLEs grossly 4-/5 MMT   -     Row Name 05/04/20 1258          Static Sitting Balance    Level of [place] (Unsupported  Sitting, Static Balance)  contact guard assist  -HC     Sitting Position (Unsupported Sitting, Static Balance)  sitting on edge of bed  -HC     Time Able to Maintain Position (Unsupported Sitting, Static Balance)  1 to 2 minutes  -HC     Row Name 05/04/20 1258          Dynamic Sitting Balance    Level of [place], Reaches Outside Midline (Sitting, Dynamic Balance)  contact guard assist;minimal assist, 75% patient effort  -HC     Sitting Position, Reaches Outside Midline (Sitting, Dynamic Balance)  sitting on edge of bed  -HC     Row Name 05/04/20 1258          Static Standing Balance    Level of [place] (Supported Standing, Static Balance)  minimal assist, 75% patient effort  -HC     Time Able to Maintain Position (Supported Standing, Static Balance)  3 to 4 minutes  -HC     Row Name 05/04/20 1258          Dynamic Standing Balance    Level of [place], Reaches Outside Midline (Standing, Dynamic Balance)  moderate assist, 50 to 74% patient effort  -HC     Time Able to Maintain Position, Reaches Outside Midline (Standing, Dynamic Balance)  1 to 2 minutes  -       User Key  (r) = Recorded By, (t) = Taken By, (c) = Cosigned By    Initials Name Provider Type    HC Love Camacho, PT Physical Therapist        Goals/Plan     Row Name 05/04/20 1302          Bed Mobility Goal 1 (PT)    Activity/Assistive Device (Bed Mobility Goal 1, PT)  bed mobility activities, all  -HC     [place] Level/Cues Needed (Bed Mobility Goal 1, PT)  supervision required  -HC     Time Frame (Bed Mobility Goal 1, PT)  2 weeks  -     Row Name 05/04/20 1302          Transfer Goal 1 (PT)    Activity/Assistive Device (Transfer Goal 1, PT)  transfers, all  -HC     [place] Level/Cues Needed (Transfer Goal 1, PT)  supervision required  -HC     Time Frame (Transfer Goal 1, PT)  2 weeks  -     Row Name 05/04/20 1302          Gait Training Goal 1 (PT)    Activity/Assistive Device (Gait Training Goal 1, PT)  gait (walking  locomotion);assistive device use  -HC     [place] Level (Gait Training Goal 1, PT)  contact guard assist  -HC     Distance (Gait Goal 1, PT)  100 ft  -HC     Time Frame (Gait Training Goal 1, PT)  2 weeks  -HC       User Key  (r) = Recorded By, (t) = Taken By, (c) = Cosigned By    Initials Name Provider Type     Love Camacho, PT Physical Therapist        Clinical Impression     Row Name 05/04/20 125          Pain Assessment    Additional Documentation  Pain Scale: FACES Pre/Post-Treatment (Group)  -HC     Row Name 05/04/20 1254          Pain Scale: FACES Pre/Post-Treatment    Pain: FACES Scale, Pretreatment  0-->no hurt  -HC     Pain: FACES Scale, Post-Treatment  0-->no hurt  -HC     Row Name 05/04/20 1252          Physical Therapy Clinical Impression    Patient/Family Goals Statement (PT Clinical Impression)  Pt is 81 yo female admitted for AMS, weakness, UTI, hypokalemia.  CT head (-).  CT abdomen showing b/l inferior pubic rami fx that appear subacute.  Pt with PMH of Alzheimer's dementia.    -HC     Criteria for Skilled Interventions Met (PT Clinical Impression)  yes;treatment indicated  -HC     Rehab Potential (PT Clinical Summary)  fair, will monitor progress closely  -HC     Predicted Duration of Therapy (PT)  2 weeks   -HC     Row Name 05/04/20 3989          Positioning and Restraints    Pre-Treatment Position  in bed  -HC     Post Treatment Position  chair  -HC     In Chair  notified nsg;call light within reach;encouraged to call for assist;exit alarm on;with nsg  -HC       User Key  (r) = Recorded By, (t) = Taken By, (c) = Cosigned By    Initials Name Provider Type     Love Camacho, PT Physical Therapist        Outcome Measures     Row Name 05/04/20 9978          How much help from another person do you currently need...    Turning from your back to your side while in flat bed without using bedrails?  2  -HC     Moving from lying on back to sitting on the side of a flat bed without  bedrails?  2  -HC     Moving to and from a bed to a chair (including a wheelchair)?  2  -HC     Standing up from a chair using your arms (e.g., wheelchair, bedside chair)?  2  -HC     Climbing 3-5 steps with a railing?  1  -HC     To walk in hospital room?  1  -HC     AM-PAC 6 Clicks Score (PT)  10  -HC     Row Name 05/04/20 1302          Modified [place] Scale    Modified [place] Scale  5 - Severe disability.  Bedridden, incontinent, and requiring constant nursing care and attention.  -     Row Name 05/04/20 1302          Functional Assessment    Outcome Measure Options  Modified [place];AM-PAC 6 Clicks Basic Mobility (PT)  -HC       User Key  (r) = Recorded By, (t) = Taken By, (c) = Cosigned By    Initials Name Provider Type    HC Love Camacho, PT Physical Therapist          PT Recommendation and Plan  Planned Therapy Interventions (PT Eval): balance training, bed mobility training, gait training, postural re-education, transfer training, neuromuscular re-education, strengthening, patient/family education  Outcome Summary/Treatment Plan (PT)  Anticipated Discharge Disposition (PT): inpatient rehabilitation facility  Plan of Care Reviewed With: patient  Outcome Summary: Pt is 81 yo female admitted for AMS, weakness, UTI, hypokalemia.  CT head (-).  CT abdomen showing b/l inferior pubic rami fx that appear subacute.  Pt with PMH of Alzheimer's dementia.   reports via phone that pt was ambulating with SUP in house and has 24/7 assist.  Pt requiring Theodore-modA for safety with bed mobility and transfers.  Pt exhibits impaired cognitive functioning and not oriented to full name, time, or situation.  Pt far from functional mobility baseline and PT is recommending IP rehab to address deficits.  However if pt's  is wanting to take pt home, PT is recommending home with 24/7 assist and HHPT.  PPE donned: mask with faceshield, gloves.     Time Calculation:   PT Charges     Row Name 05/04/20 6444              Time Calculation    Start Time  1051  -      Stop Time  1114  -      Time Calculation (min)  23 min  -      PT Received On  05/04/20  -      PT - Next Appointment  05/05/20  -      PT Goal Re-Cert Due Date  05/18/20  -         Time Calculation- PT    Total Timed Code Minutes- PT  10 minute(s)  -        User Key  (r) = Recorded By, (t) = Taken By, (c) = Cosigned By    Initials Name Provider Type     Love Camacho, PT Physical Therapist        Therapy Charges for Today     Code Description Service Date Service Provider Modifiers Qty    [id number]  PT EVAL MOD COMPLEXITY 2 5/4/2020 Love Camacho, PT GP 1    [id number]  PT THERAPEUTIC ACT EA 15 MIN 5/4/2020 Love Camacho, PT GP 1          PT G-Codes  Outcome Measure Options: Modified [place], AM-PAC 6 Clicks Basic Mobility (PT)  AM-PAC 6 Clicks Score (PT): 10  Modified [place] Scale: 5 - Severe disability.  Bedridden, incontinent, and requiring constant nursing care and attention.    Love Camacho, PT  5/4/2020

## 2020-05-04 NOTE — PLAN OF CARE
Problem: Patient Care Overview  Goal: Plan of Care Review  Outcome: Ongoing (interventions implemented as appropriate)  Flowsheets (Taken 5/4/2020 0912)  Outcome Summary: Pt admitted for UTI, increased weakness, AMS, and subacute pubic rami fracture.  She presents with significant confusion and unable to follow any commands.  She trasnferred with mod assist though unable to participate in any ADLs.   assists pt with all ADLs at baseline, though pt normally fairly independent with mobility at baseline.  At this time, recommend IP rehab at discharge, though  reports he plans to take pt home.  If so, pt will reuqire 24 hour assist and HH PT/OT at discharge.  PPE: mask, gloves, faceshield.

## 2020-05-04 NOTE — PROGRESS NOTES
Discharge Planning Assessment   Denis     Patient Name: Taylor Price  MRN: [id number]  Today's Date: 5/4/2020    Admit Date: 5/3/2020    Discharge Needs Assessment     Row Name 05/04/20 1455       Living Environment    Lives With  spouse    Name(s) of Who Lives With Patient  David - spoke with per phone. [phone]    Unique Family Situation  [place]    Current Living Arrangements  home/apartment/condo    Primary Care Provided by  self;spouse/significant other    Provides Primary Care For  no one, unable/limited ability to care for self    Family Caregiver if Needed  spouse    Family Caregiver Names  Daughter and son also help parents.    Able to Return to Prior Arrangements  yes       Resource/Environmental Concerns    Resource/Environmental Concerns  none    Transportation Concerns  car, none       Transition Planning    Patient/Family Anticipates Transition to  home with family    Patient/Family Anticipated Services at Transition  none    Transportation Anticipated  family or friend will provide       Discharge Needs Assessment    Readmission Within the Last 30 Days  no previous admission in last 30 days    Concerns to be Addressed  discharge planning    Concerns Comments  PT/OT pending - may require placement.    Equipment Currently Used at Home  walker, rolling;commode    Anticipated Changes Related to Illness  inability to care for self    Discharge Coordination/Progress  DC Plan: Pending PT/OT - probable placement for inpt rehab.        Discharge Plan     Row Name 05/04/20 1459       Plan    Plan  DC Plan: Pending PT/OT - probable placement for inpt rehab.    Plan Comments  Palliative Care Team consult pending.                  Demographic Summary     Row Name 05/04/20 1459       General Information    Admission Type  inpatient    Arrived From  emergency department    Required Notices Provided  Important Message from Medicare    Referral Source  admission list    Reason for Consult  discharge  planning    Preferred Language  English                     Torsten Alves RN

## 2020-05-04 NOTE — H&P
[facility] Hospital Medicine Services      Patient Name: Taylor Price  : 1940  MRN: [id number]  Primary Care Physician: Camelia Miller APRN  Date of admission: 5/3/2020    Patient Care Team:  Camelia Miller APRN as PCP - General          Subjective   History Present Illness     Chief Complaint:   Chief Complaint   Patient presents with   • Failure To Thrive      reports pt not eating or drinking        Ms. Price is a 80 y.o. female with past medical history of Alzheimer's, depression, GERD, hyperlipidemia, hypertension and osteoarthritis who presents to [facility] via ambulance after patient's family noted she was having increasing weakness and failure to thrive.  The time of my exam patient is alert and she will say okay thank you any time she has explained information but speakes incomprehensible words when asked most questions.  ED provider spoke with patient's family who state for the past several days she has been unwilling to move from her recliner and when attempted to give any p.o. intake she either pushes it away or spits it out.  No recent trauma, fever or other obvious illnesses reported by family.  Patient is unable to provide any additional ROS information    Labs in the ED were significant for troponin of less than 0.010, sodium: 151, potassium: 3.0, creatinine: 1.08, BUN: 36, BUN/creatinine ratio: 33.3, EGFR: 49, lipase: 88, magnesium: 2.5 and TSH: 1.350.  CBC reviewed and is generally unremarkable.  UA noted with 6-12 WBCs, no bacteria, 0-2 RBCs, 1+ protein, 2+ leukocyte esterase, 1+ ketones however specimen appears to be contaminated with 3-sick squamous epithelial cells noted.  Urine cultures obtained and is pending.  Chest x-ray reviewed and shows no acute cardiopulmonary process.  CT of the abdomen and pelvis reported with air in the urinary bladder, fractures of the bilateral inferior pubic rami and left superior pubic rami which appear to  be subacute.  There also appears to be an insufficiency fracture involving the right sacral ala.  A small nonobstructing left renal stone is noted as well as some sigmoid diverticulosis and a 6.4 cm left adnexal cyst slightly increased from prior exam.  CT of the head without contrast reported as showing no acute intracranial process with findings suggestive of moderate chronic small vessel ischemic disease.  EKG shows sinus arrhythmia at 66 with multiple premature supraventricular complexes noted as well as diffuse T wave inversion and a QTC of 459 ms.         History of Present Illness    Review of Systems   Unable to perform ROS: dementia           Personal History     Past Medical History:   Past Medical History:   Diagnosis Date   • Alzheimer disease (CMS/HCC)    • Arthritis    • Depression    • GERD (gastroesophageal reflux disease)    • Hypertension        Surgical History:      Past Surgical History:   Procedure Laterality Date   • HYSTERECTOMY             Family History: Family history is unknown by patient. Otherwise pertinent FHx was reviewed and unremarkable.     Social History:  reports that she has never smoked. She has never used smokeless tobacco. She reports that she does not drink alcohol or use drugs.      Medications:  Prior to Admission medications    Medication Sig Start Date End Date Taking? Authorizing Provider   aspirin 81 MG chewable tablet Chew 81 mg Daily.   Yes Vero Martin MD   pravastatin (PRAVACHOL) 80 MG tablet Take 80 mg by mouth Daily.   Yes Vero Martin MD   amLODIPine (NORVASC) 2.5 MG tablet Take 2.5 mg by mouth Daily.    Vero Martin MD   benazepril (LOTENSIN) 40 MG tablet Take 40 mg by mouth Daily.    Vero Martin MD   calcium carb-cholecalciferol (CALCIUM 600+D) 600-800 MG-UNIT tablet Take 1 tablet by mouth Daily.    Vero Martin MD   citalopram (CeleXA) 20 MG tablet Take 20 mg by mouth Daily.    Vero Martin MD   donepezil  (ARICEPT) 10 MG tablet Take 10 mg by mouth Every Night.    Provider, MD Vero   meloxicam (MOBIC) 15 MG tablet Take 15 mg by mouth Daily.    Provider, MD Vero   omeprazole (priLOSEC) 20 MG capsule Take 20 mg by mouth Daily.    Provider, MD Vero       Allergies:  No Known Allergies    Objective   Objective     Vital Signs  Temp:  [99.1 °F (37.3 °C)] 99.1 °F (37.3 °C)  Heart Rate:  [62-79] 73  Resp:  [14-21] 21  BP: (118-171)/(74-89) 165/74  SpO2:  [97 %-100 %] 97 %  on   ;      Body mass index is 25.39 kg/m².    Physical Exam   Constitutional: She appears well-developed. No distress.   HENT:   Head: Normocephalic and atraumatic.   Right Ear: External ear normal.   Left Ear: External ear normal.   Nose: Nose normal.   Eyes: Conjunctivae and EOM are normal.   Pupils equally constricted bilaterally   Neck: Normal range of motion. Neck supple. No JVD present.   Cardiovascular: Normal rate, regular rhythm, normal heart sounds and intact distal pulses.   Pulmonary/Chest: Effort normal and breath sounds normal.   Abdominal: Soft. Bowel sounds are normal.   Musculoskeletal: Normal range of motion.   Lymphadenopathy:     She has no cervical adenopathy.   Neurological: She is alert.   Will follow basic commands, speaks incomprehensive words when asked questions but will look at examiner and smile throughout exam   Skin: Skin is warm and dry.   Psychiatric: She has a normal mood and affect.   Vitals reviewed.      Results Review:  I have personally reviewed most recent cardiac tracings, lab results and radiology images and interpretations and agree with findings, most notably: Sodium, potassium, creatinine, BUN, CT of head and abdomen and EKG.    Results from last 7 days   Lab Units 05/03/20  1745   WBC 10*3/mm3 10.20   HEMOGLOBIN g/dL 13.9   HEMATOCRIT % 40.9   PLATELETS 10*3/mm3 283     Results from last 7 days   Lab Units 05/03/20  1745   SODIUM mmol/L 151*   POTASSIUM mmol/L 3.0*   CHLORIDE mmol/L 113*      CO2 mmol/L 23.0   BUN mg/dL 36*   CREATININE mg/dL 1.08*   GLUCOSE mg/dL 118*   CALCIUM mg/dL 10.0   ALT (SGPT) U/L 48*   AST (SGOT) U/L 55*   TROPONIN T ng/mL <0.010   PROBNP pg/mL 453.9     Estimated Creatinine Clearance: 33.4 mL/min (A) (by C-G formula based on SCr of 1.08 mg/dL (H)).  Brief Urine Lab Results  (Last result in the past 365 days)      Color   Clarity   Blood   Leuk Est   Nitrite   Protein   CREAT   Urine HCG        05/03/20 1838 Dark Yellow  Comment:  Result checked Visual Confirmation Clear Negative Moderate (2+) Negative 30 mg/dL (1+)               Microbiology Results (last 10 days)     ** No results found for the last 240 hours. **          ECG/EMG Results (most recent)     Procedure Component Value Units Date/Time    ECG 12 Lead [[id number]] Collected:  05/03/20 1757     Updated:  05/03/20 1759    Narrative:       HEART RATE= 66  bpm  RR Interval= 1060  ms  MT Interval= 61  ms  P Horizontal Axis=   deg  P Front Axis= 0  deg  QRSD Interval= 76  ms  QT Interval= 445  ms  QRS Axis= 51  deg  T Wave Axis= 257  deg  - ABNORMAL ECG -  Sinus rhythm  Multiple premature complexes, vent & supraven  Abnormal T, consider ischemia, diffuse leads  Electronically Signed By:   Date and Time of Study: 2020-05-03 17:57:57                    Ct Abdomen Pelvis Without Contrast    Result Date: 5/3/2020  1.Air within urinary bladder. Correlate for recent instrumentation or cystitis. 2.Fractures of bilateral inferior pubic rami and left superior pubic rami that appear subacute. There also appears to be an insufficiency fracture involving the right sacral ala. Correlate with focal tenderness and history of recent trauma. 3.Small nonobstructing left renal stone. 4.Sigmoid diverticulosis. 5.6.4 cm left adnexal cyst, slightly increased from prior exam. Recommend follow-up pelvic ultrasound in one year.    Electronically Signed By-DR. Hira Rocha MD On:5/3/2020 8:13 PM This report was finalized on [id number] by  DR. Hira Rocha MD.    Ct Head Without Contrast    Result Date: 5/3/2020  1.No acute intracranial process identified. 2.Findings suggestive of moderate chronic small vessel ischemic disease.  Electronically Signed By-DR. Hira Rocha MD On:5/3/2020 8:07 PM This report was finalized on [id number] by DR. Hira Rocha MD.    Xr Chest 1 View    Result Date: 5/3/2020  No acute cardiopulmonary process identified.  Electronically Signed By-DR. Hira Rocha MD On:5/3/2020 6:17 PM This report was finalized on [id number] by DR. Hira Rocha MD.        Estimated Creatinine Clearance: 33.4 mL/min (A) (by C-G formula based on SCr of 1.08 mg/dL (H)).    Assessment/Plan   Assessment/Plan       Active Hospital Problems    Diagnosis  POA   • **Weakness [R53.1]  Yes     Priority: High   • CORNELIO (acute kidney injury) (CMS/HCC) [N17.9]  Yes     Priority: High   • Hypernatremia [E87.0]  Yes     Priority: Medium   • Malnutrition (CMS/HCC) [E46]  Yes     Priority: Medium   • Hypokalemia [E87.6]  Yes     Priority: Medium   • Hypertension [I10]  Yes     Priority: Medium   • Alzheimer disease (CMS/HCC) [G30.9, F02.80]  Yes     Priority: Low   • GERD (gastroesophageal reflux disease) [K21.9]  Yes     Priority: Low   • Depression [F32.9]  Yes     Priority: Low   • Hyperlipidemia [E78.5]  Yes     Priority: Low   • Osteoarthritis [M19.90]  Yes     Priority: Low      Resolved Hospital Problems   No resolved problems to display.     1.  Increasing weakness with failure to thrive  -Family notes patient having increasing weakness, confusion and generalized failure to thrive over the past 3 days with significantly decreased p.o. Intake  -CT of the abdomen and pelvis reported with air in the urinary bladder, fractures of the bilateral inferior pubic rami and left superior pubic rami which appear to be subacute.  There also appears to be an insufficiency fracture involving the right sacral ala.  A small nonobstructing left  renal stone is noted as well as some sigmoid diverticulosis and a 6.4 cm left adnexal cyst slightly increased from prior exam.    -CT of the head without contrast reported as showing no acute intracranial process with findings suggestive of moderate chronic small vessel ischemic disease.    -EKG shows sinus arrhythmia at 66 with multiple premature supraventricular complexes noted as well as diffuse T wave inversion and a QTC of 459 ms.  -Sterile pyuria noted on UA though some contamination appears to be present, ceftriaxone given in the ED  -PT/OT consult  -N.p.o. pending swallow study  -Continue fluids as below  -Case management consult placed for possible placement at discharge  -Patient family may benefit from possible GI or palliative care consult to discuss future goals and treatment options    2.  CORNELIO (likely prerenal)  -Creatinine: 1.08, BUN: 36, BUN/creatinine ratio: 33.3 with an EGFR of 49  -1 L fluid bolus given in the ED  -Continue D5 half-normal saline at 100 mL/h x 5 hours  -ACEI, Mobic and PPI held  -Monitor BMP    3.  Hypernatremia  -Sodium: 151, patient given 1 L bolus normal saline in the ED  -D5W with half-normal saline ordered at 100 mL's per hour x5 hours  -Monitor BMP    4.  Malnutrition (current BMI: 21.3)  -Patient appears to have lost approximately 65 pounds since 12/3/2019  -Swallow study ordered  -Nutrition consult    5.  Hypokalemia  -Potassium: 3.0  -Magnesium: 2.5  -Replacement protocol ordered    6.  Hypertension  -Poorly controlled with a blood pressure on admission of 171/81  -ACEI held pending resolution of CORNELIO noted above  - Continue amlodipine  -Hydralazine PRN  - Monitor while admitted    7.  Alzheimer's dementia  -Patient's mental condition noted is worsened from baseline per ED provider who spoke with family  -Continue Aricept    8.  GERD  -PPI held pending resolution of CORNELIO noted above  -Maalox as needed    9.  Depression  -Continue Celexa    10.  Hyperlipidemia  -Continue  statin    12.  Osteoarthritis  -CT reports multiple subacute fractures of the bilateral inferior pubic rami and left superior pubic rami  -Continue calcium/vitamin D  - maintain falls precautions            VTE Prophylaxis -Lovenox  Mechanical Order History:     None      Pharmalogical Order History:     None          CODE STATUS: Full  Code Status and Medical Interventions:   Ordered at: 05/03/20 2116     Level Of Support Discussed With:    Patient     Code Status:    CPR     Medical Interventions (Level of Support Prior to Arrest):    Full           I discussed the patient's findings and my recommendations with patient.        Electronically signed by Keith Love PA-C, 05/03/20, 8:37 PM.  [facility], [place], operated by [facility] Hospitalist Team

## 2020-05-04 NOTE — THERAPY EVALUATION
Acute Care - Occupational Therapy Initial Evaluation   Denis     Patient Name: Taylor Price  : 1940  MRN: [id number]  Today's Date: 2020             Admit Date: 5/3/2020       ICD-10-CM ICD-9-CM   1. Weakness R53.1 780.79   2. Urinary tract infection without hematuria, site unspecified N39.0 599.0   3. Hypokalemia E87.6 276.8   4. Altered mental status, unspecified altered mental status type R41.82 780.97     Patient Active Problem List   Diagnosis   • Nondisplaced zone i fracture of sacrum, initial encounter for closed fracture (CMS/[facility])   • Hyperlipidemia   • Hypertension   • Dementia of the Alzheimer's type with late onset without behavioral disturbance (CMS/[facility])   • Osteoarthritis   • Depression   • Age-related physical debility   • Delirium   • GERD (gastroesophageal reflux disease)   • Alzheimer disease (CMS/[facility])   • Weakness   • Hypernatremia   • CORNELIO (acute kidney injury) (CMS/[facility])   • Malnutrition (CMS/[facility])   • Hypokalemia     Past Medical History:   Diagnosis Date   • Alzheimer disease (CMS/[facility])    • Arthritis    • Depression    • GERD (gastroesophageal reflux disease)    • Hypertension      Past Surgical History:   Procedure Laterality Date   • HYSTERECTOMY            OT ASSESSMENT FLOWSHEET (last 12 hours)      Occupational Therapy Evaluation     Row Name 20 0912                   OT Evaluation Time/Intention    Subjective Information  no complaints  -SR        Document Type  evaluation  -SR        Mode of Treatment  occupational therapy  -SR        Comment  Pt lives at home with .   assists her with all ADLs.  Assists her to complete sink baths.  She normally walks without rwx though has one if needed.  She is limited conversationally due to dementia.     -SR           General Information    Pertinent History of Current Functional Problem  Pt admitted for UTI, increased weakness, AMS, and subacute pubic rami fracture.    -SR        Existing Precautions/Restrictions   fall  -SR           Relationship/Environment    Lives With  spouse  -SR        Family Caregiver if Needed  spouse  -SR           Resource/Environmental Concerns    Current Living Arrangements  home/apartment/condo  -SR           Cognitive Assessment/Intervention- PT/OT    Orientation Status (Cognition)  disoriented to;person;place;situation;time Unable to state name   -SR           Bed Mobility Assessment/Treatment    Bed Mobility Assessment/Treatment  supine-sit;sit-supine  -SR        Supine-Sit [place] (Bed Mobility)  minimum assist (75% patient effort)  -SR        Sit-Supine [place] (Bed Mobility)  minimum assist (75% patient effort)  -SR           Transfer Assessment/Treatment    Transfer Assessment/Treatment  chair-bed transfer;bed-chair transfer  -SR           Bed-Chair Transfer    Bed-Chair [place] (Transfers)  moderate assist (50% patient effort)  -SR           Chair-Bed Transfer    Chair-Bed [place] (Transfers)  moderate assist (50% patient effort)  -SR           ADL Assessment/Intervention    BADL Assessment/Intervention  grooming;toileting;lower body dressing  -SR           Lower Body Dressing Assessment/Training    Lower Body Dressing [place] Level  doff;socks;dependent (less than 25% patient effort)  -SR           Grooming Assessment/Training    [place] Level (Grooming)  hair care, combing/brushing;dependent (less than 25% patient effort)  -SR           Toileting Assessment/Training    [place] Level (Toileting)  dependent (less than 25% patient effort)  -SR           General ROM    GENERAL ROM COMMENTS  Unable to follow commands for ROM testing, though shoulders seemed slightly impaired with flexion.  Good distal ROM functionally.    -SR           MMT (Manual Muscle Testing)    General MMT Comments  Unable to test due to significant confusion.    -SR           Motor Assessment/Interventions    Additional Documentation  Balance (Group)  -SR           Balance    Balance   static sitting balance;static standing balance;dynamic sitting balance;dynamic standing balance  -SR           Static Sitting Balance    Level of [place] (Unsupported Sitting, Static Balance)  supervision  -SR           Dynamic Sitting Balance    Level of [place], Reaches Outside Midline (Sitting, Dynamic Balance)  contact guard assist  -SR           Static Standing Balance    Level of [place] (Supported Standing, Static Balance)  moderate assist, 50 to 74% patient effort  -SR        Time Able to Maintain Position (Supported Standing, Static Balance)  less than 15 seconds  -SR           Dynamic Standing Balance    Level of [place], Reaches Outside Midline (Standing, Dynamic Balance)  moderate assist, 50 to 74% patient effort  -SR        Time Able to Maintain Position, Reaches Outside Midline (Standing, Dynamic Balance)  less than 15 seconds  -SR           Sensory Assessment/Intervention    Sensory General Assessment  no sensation deficits identified  -SR           Positioning and Restraints    Pre-Treatment Position  in bed  -SR        Post Treatment Position  bed  -SR        In Bed  sitting EOB;encouraged to call for assist;call light within reach  -SR           Pain Assessment    Additional Documentation  Pain Scale: FACES Pre/Post-Treatment (Group)  -SR           Pain Scale: FACES Pre/Post-Treatment    Pain: FACES Scale, Pretreatment  0-->no hurt  -SR        Pain: FACES Scale, Post-Treatment  0-->no hurt  -SR           Plan of Care Review    Outcome Summary  Pt admitted for UTI, increased weakness, AMS, and subacute pubic rami fracture.  She presents with significant confusion and unable to follow any commands.  She trasnferred with mod assist though unable to participate in any ADLs.   assists pt with all ADLs at baseline, though pt normally fairly independent with mobility at baseline.  At this time, recommend IP rehab at discharge, though  reports he plans to take pt home.  If  so, pt will reuqire 24 hour assist and HH PT/OT at discharge.  PPE: mask, gloves, faceshield.   -SR           Clinical Impression (OT)    Criteria for Skilled Therapeutic Interventions Met (OT Eval)  yes;treatment indicated  -SR        Rehab Potential (OT Eval)  good, to achieve stated therapy goals  -SR        Therapy Frequency (OT Eval)  daily  -SR        Predicted Duration of Therapy Intervention (Therapy Eval)  Until discharge   -SR        Anticipated Discharge Disposition (OT)  inpatient rehabilitation facility  -SR           Planned OT Interventions    Planned Therapy Interventions (OT Eval)  activity tolerance training;BADL retraining;functional balance retraining;transfer/mobility retraining;strengthening exercise;occupation/activity based interventions  -SR           OT Goals    Transfer Goal Selection (OT)  transfer, OT goal 1  -SR        Toileting Goal Selection (OT)  toileting, OT goal 1  -SR           Transfer Goal 1 (OT)    Activity/Assistive Device (Transfer Goal 1, OT)  bed-to-chair/chair-to-bed  -SR        [place] Level/Cues Needed (Transfer Goal 1, OT)  supervision required  -SR        Time Frame (Transfer Goal 1, OT)  2 weeks  -SR           Toileting Goal 1 (OT)    Activity/Device (Toileting Goal 1, OT)  toileting skills, all  -SR        [place] Level/Cues Needed (Toileting Goal 1, OT)  minimum assist (75% or more patient effort)  -SR        Time Frame (Toileting Goal 1, OT)  2 weeks  -SR          User Key  (r) = Recorded By, (t) = Taken By, (c) = Cosigned By    Initials Name Effective Dates    SR Debby Burgos, OT 03/01/19 -                OT Recommendation and Plan  Outcome Summary/Treatment Plan (OT)  Anticipated Discharge Disposition (OT): inpatient rehabilitation facility  Planned Therapy Interventions (OT Eval): activity tolerance training, BADL retraining, functional balance retraining, transfer/mobility retraining, strengthening exercise, occupation/activity based  interventions  Therapy Frequency (OT Eval): daily  Outcome Summary: Pt admitted for UTI, increased weakness, AMS, and subacute pubic rami fracture.  She presents with significant confusion and unable to follow any commands.  She trasnferred with mod assist though unable to participate in any ADLs.   assists pt with all ADLs at baseline, though pt normally fairly independent with mobility at baseline.  At this time, recommend IP rehab at discharge, though  reports he plans to take pt home.  If so, pt will reuqire 24 hour assist and HH PT/OT at discharge.  PPE: mask, gloves, faceshield.         Time Calculation:   Time Calculation- OT     Row Name 05/04/20 1302             Time Calculation- OT    OT Start Time  0912  -SR      OT Stop Time  0935  -SR      OT Time Calculation (min)  23 min  -SR      Total Timed Code Minutes- OT  10 minute(s)  -SR      OT Non-Billable Time (min)  13 min  -SR      OT Received On  05/04/20  -SR      OT - Next Appointment  05/05/20  -SR      OT Goal Re-Cert Due Date  05/18/20  -SR        User Key  (r) = Recorded By, (t) = Taken By, (c) = Cosigned By    Initials Name Provider Type    SR Debby Burgos OT Occupational Therapist        Therapy Charges for Today     Code Description Service Date Service Provider Modifiers Qty    [id number]  OT EVAL MOD COMPLEXITY 3 5/4/2020 Debby Burgos OT GO 1               Debby Burgos OT  5/4/2020

## 2020-05-04 NOTE — PLAN OF CARE
Problem: Patient Care Overview  Goal: Plan of Care Review  Outcome: Ongoing (interventions implemented as appropriate)  Flowsheets  Taken 5/4/2020 0255  Progress: no change  Taken 5/3/2020 2158  Plan of Care Reviewed With: patient  Goal: Individualization and Mutuality  Outcome: Ongoing (interventions implemented as appropriate)  Goal: Discharge Needs Assessment  Outcome: Ongoing (interventions implemented as appropriate)  Flowsheets  Taken 5/3/2020 2151  Equipment Currently Used at Home: none  Taken 5/3/2020 2153  Transportation Anticipated: family or friend will provide  Patient/Family Anticipated Services at Transition: none  Patient/Family Anticipates Transition to: home  Goal: Interprofessional Rounds/Family Conf  Outcome: Ongoing (interventions implemented as appropriate)     Problem: Fall Risk (Adult)  Goal: Identify Related Risk Factors and Signs and Symptoms  Outcome: Ongoing (interventions implemented as appropriate)  Goal: Absence of Fall  Outcome: Ongoing (interventions implemented as appropriate)  Flowsheets (Taken 5/4/2020 0255)  Absence of Fall: making progress toward outcome     Problem: Pain, Acute (Adult)  Goal: Identify Related Risk Factors and Signs and Symptoms  Outcome: Ongoing (interventions implemented as appropriate)  Goal: Acceptable Pain Control/Comfort Level  Outcome: Ongoing (interventions implemented as appropriate)  Flowsheets (Taken 5/4/2020 0255)  Acceptable Pain Control/Comfort Level: making progress toward outcome     Problem: Skin Injury Risk (Adult)  Goal: Identify Related Risk Factors and Signs and Symptoms  Outcome: Ongoing (interventions implemented as appropriate)  Flowsheets (Taken 5/4/2020 0255)  Related Risk Factors (Skin Injury Risk): cognitive impairment; advanced age; fluid intake inadequate  Goal: Skin Health and Integrity  Outcome: Ongoing (interventions implemented as appropriate)  Flowsheets (Taken 5/4/2020 0255)  Skin Health and Integrity: making progress toward  outcome

## 2020-05-04 NOTE — PROGRESS NOTES
[facility] Medicine Services Daily Progress Note      Hospitalist Team  LOS 1 days      Patient Care Team:  Camelia Miller APRN as PCP - General    Patient Location: 233/1      Subjective   Subjective     Chief Complaint / Subjective  Chief Complaint   Patient presents with   • Failure To Thrive      reports pt not eating or drinking          Brief Synopsis of Hospital Course/HPI    Ms. Price is a 80 y.o. female with past medical history of Alzheimer's, depression, GERD, hyperlipidemia, hypertension and osteoarthritis who presents to [facility] via ambulance after patient's family noted she was having increasing weakness and failure to thrive.  The time of my exam patient is alert and she will say okay thank you any time she has explained information but speakes incomprehensible words when asked most questions.  ED provider spoke with patient's family who state for the past several days she has been unwilling to move from her recliner and when attempted to give any p.o. intake she either pushes it away or spits it out.  No recent trauma, fever or other obvious illnesses reported by family.  Patient is unable to provide any additional ROS information     Labs in the ED were significant for troponin of less than 0.010, sodium: 151, potassium: 3.0, creatinine: 1.08, BUN: 36, BUN/creatinine ratio: 33.3, EGFR: 49, lipase: 88, magnesium: 2.5 and TSH: 1.350.  CBC reviewed and is generally unremarkable.  UA noted with 6-12 WBCs, no bacteria, 0-2 RBCs, 1+ protein, 2+ leukocyte esterase, 1+ ketones however specimen appears to be contaminated with 3-sick squamous epithelial cells noted.  Urine cultures obtained and is pending.  Chest x-ray reviewed and shows no acute cardiopulmonary process.  CT of the abdomen and pelvis reported with air in the urinary bladder, fractures of the bilateral inferior pubic rami and left superior pubic rami which appear to be subacute.  There also  "appears to be an insufficiency fracture involving the right sacral ala.  A small nonobstructing left renal stone is noted as well as some sigmoid diverticulosis and a 6.4 cm left adnexal cyst slightly increased from prior exam.  CT of the head without contrast reported as showing no acute intracranial process with findings suggestive of moderate chronic small vessel ischemic disease.  EKG shows sinus arrhythmia at 66 with multiple premature supraventricular complexes noted as well as diffuse T wave inversion and a QTC of 459 ms.                  Date::    5/4  No verbal responses  Data and notes reveiwed  Awake but only utters some words      ROS  Unable to review as, no verbal reposnse    Objective   Objective      Vital Signs  Temp:  [97.9 °F (36.6 °C)-99.1 °F (37.3 °C)] 97.9 °F (36.6 °C)  Heart Rate:  [62-79] 74  Resp:  [14-21] 18  BP: (118-171)/(74-89) 166/78  Oxygen Therapy  SpO2: 98 %  Pulse Oximetry Type: Continuous  Device (Oxygen Therapy): room air  Flowsheet Rows      First Filed Value   Admission Height  152.4 cm (60\") Documented at 05/03/2020 1703   Admission Weight  59 kg (130 lb) Documented at 05/03/2020 1703        Intake & Output (last 3 days)       05/01 0701 - 05/02 0700 05/02 0701 - 05/03 0700 05/03 0701 - 05/04 0700 05/04 0701 - 05/05 0700    Urine (mL/kg/hr)    0 (0)    Stool    0    Total Output    0    Net    0            Urine Unmeasured Occurrence   1 x 1 x        Lines, Drains & Airways    Active LDAs     Name:   Placement date:   Placement time:   Site:   Days:    Peripheral IV 05/03/20 1743 Right Forearm   05/03/20 1743    Forearm   less than 1                  Physical Exam:    Physical Exam   Constitutional: She appears well-developed and well-nourished. No distress.   HENT:   Head: Normocephalic and atraumatic.   Right Ear: External ear normal.   Left Ear: External ear normal.   Nose: Nose normal.   Mouth/Throat: Oropharynx is clear and moist. No oropharyngeal exudate.   Eyes: " Pupils are equal, round, and reactive to light. Conjunctivae, EOM and lids are normal. Right eye exhibits no discharge. Left eye exhibits no discharge. No scleral icterus.   Neck: Normal range of motion. No JVD present. No tracheal deviation present. No thyromegaly present.   Cardiovascular: Normal rate, regular rhythm, normal heart sounds and intact distal pulses. Exam reveals no gallop and no friction rub.   No murmur heard.  Pulmonary/Chest: Effort normal and breath sounds normal. No stridor. No respiratory distress. She has no wheezes. She has no rales. She exhibits no tenderness.   Abdominal: Soft. Bowel sounds are normal. She exhibits no distension and no mass. There is no tenderness. There is no rebound and no guarding. No hernia.   Musculoskeletal: Normal range of motion. She exhibits no edema, tenderness or deformity.   Lymphadenopathy:     She has no cervical adenopathy.   Neurological: She is alert. No cranial nerve deficit or sensory deficit. She exhibits normal muscle tone. Coordination normal.   No verbal reponses , no focal lateralizing deficits   Skin: Skin is warm and dry. No rash noted. She is not diaphoretic. No erythema.   Nursing note and vitals reviewed.            Procedures:              Results Review:     I reviewed the patient's new clinical results.      Lab Results (last 24 hours)     Procedure Component Value Units Date/Time    Basic Metabolic Panel [[id number]]  (Abnormal) Collected:  05/04/20 0340    Specimen:  Blood Updated:  05/04/20 0543     Glucose 124 mg/dL      BUN 35 mg/dL      Creatinine 0.82 mg/dL      Sodium 153 mmol/L      Potassium 2.6 mmol/L      Chloride 115 mmol/L      CO2 25.0 mmol/L      Calcium 9.2 mg/dL      eGFR Non African Amer 67 mL/min/1.73      BUN/Creatinine Ratio 42.7     Anion Gap 13.0 mmol/L     Narrative:       GFR Normal >60  Chronic Kidney Disease <60  Kidney Failure <15      Phosphorus [[id number]]  (Abnormal) Collected:  05/04/20 0340    Specimen:  Blood  Updated:  05/04/20 0542     Phosphorus 1.7 mg/dL     Troponin [[id number]]  (Normal) Collected:  05/04/20 0340    Specimen:  Blood Updated:  05/04/20 0530     Troponin T <0.010 ng/mL     Narrative:       Troponin T Reference Range:  <= 0.03 ng/mL-   Negative for AMI  >0.03 ng/mL-     Abnormal for myocardial necrosis.  Clinicians would have to utilize clinical acumen, EKG, Troponin and serial changes to determine if it is an Acute Myocardial Infarction or myocardial injury due to an underlying chronic condition.       Results may be falsely decreased if patient taking Biotin.      Lipid Panel [[id number]]  (Abnormal) Collected:  05/04/20 0340    Specimen:  Blood Updated:  05/04/20 0530     Total Cholesterol 283 mg/dL      Triglycerides 132 mg/dL      HDL Cholesterol 57 mg/dL      LDL Cholesterol  200 mg/dL      VLDL Cholesterol 26.4 mg/dL      LDL/HDL Ratio 3.50    Narrative:       Cholesterol Reference Ranges  (U.S. Department of Health and Human Services ATP III Classifications)    Desirable          <200 mg/dL  Borderline High    200-239 mg/dL  High Risk          >240 mg/dL      Triglyceride Reference Ranges  (U.S. Department of Health and Human Services ATP III Classifications)    Normal           <150 mg/dL  Borderline High  150-199 mg/dL  High             200-499 mg/dL  Very High        >500 mg/dL    HDL Reference Ranges  (U.S. Department of Health and Human Services ATP III Classifcations)    Low     <40 mg/dl (major risk factor for CHD)  High    >60 mg/dl ('negative' risk factor for CHD)        LDL Reference Ranges  (U.S. Department of Health and Human Services ATP III Classifcations)    Optimal          <100 mg/dL  Near Optimal     100-129 mg/dL  Borderline High  130-159 mg/dL  High             160-189 mg/dL  Very High        >189 mg/dL    CK [[id number]]  (Normal) Collected:  05/04/20 0340    Specimen:  Blood Updated:  05/04/20 0530     Creatine Kinase 177 U/L     Magnesium [[id number]]  (Normal) Collected:   05/04/20 0340    Specimen:  Blood Updated:  05/04/20 0530     Magnesium 2.3 mg/dL     CBC & Differential [[id number]] Collected:  05/04/20 0340    Specimen:  Blood Updated:  05/04/20 0456    Narrative:       The following orders were created for panel order CBC & Differential.  Procedure                               Abnormality         Status                     ---------                               -----------         ------                     CBC Auto Differential[[id number]]        Abnormal            Final result                 Please view results for these tests on the individual orders.    CBC Auto Differential [[id number]]  (Abnormal) Collected:  05/04/20 0340    Specimen:  Blood Updated:  05/04/20 0456     WBC 10.70 10*3/mm3      RBC 4.15 10*6/mm3      Hemoglobin 12.9 g/dL      Hematocrit 38.2 %      MCV 92.0 fL      MCH 31.0 pg      MCHC 33.7 g/dL      RDW 14.1 %      RDW-SD 45.9 fl      MPV 9.0 fL      Platelets 242 10*3/mm3      Neutrophil % 67.7 %      Lymphocyte % 24.7 %      Monocyte % 6.4 %      Eosinophil % 0.9 %      Basophil % 0.3 %      Neutrophils, Absolute 7.30 10*3/mm3      Lymphocytes, Absolute 2.60 10*3/mm3      Monocytes, Absolute 0.70 10*3/mm3      Eosinophils, Absolute 0.10 10*3/mm3      Basophils, Absolute 0.00 10*3/mm3      nRBC 0.1 /100 WBC     Hemoglobin A1c [[id number]] Collected:  05/04/20 0340    Specimen:  Blood Updated:  05/04/20 0448    Vitamin B12 [[id number]] Collected:  05/04/20 0340    Specimen:  Blood Updated:  05/04/20 0446    Troponin [[id number]]  (Normal) Collected:  05/03/20 2225    Specimen:  Blood Updated:  05/03/20 2249     Troponin T <0.010 ng/mL     Narrative:       Troponin T Reference Range:  <= 0.03 ng/mL-   Negative for AMI  >0.03 ng/mL-     Abnormal for myocardial necrosis.  Clinicians would have to utilize clinical acumen, EKG, Troponin and serial changes to determine if it is an Acute Myocardial Infarction or myocardial injury due to an underlying chronic  condition.       Results may be falsely decreased if patient taking Biotin.      Potassium [[id number]]  (Abnormal) Collected:  05/03/20 2225    Specimen:  Blood Updated:  05/03/20 2244     Potassium 2.7 mmol/L     BNP [[id number]]  (Normal) Collected:  05/03/20 1745    Specimen:  Blood Updated:  05/03/20 2139     proBNP 453.9 pg/mL     Narrative:       Among patients with dyspnea, NT-proBNP is highly sensitive for the detection of acute congestive heart failure. In addition NT-proBNP of <300 pg/ml effectively rules out acute congestive heart failure with 99% negative predictive value.    Results may be falsely decreased if patient taking Biotin.      Urinalysis, Microscopic Only - Urine, Catheter [[id number]]  (Abnormal) Collected:  05/03/20 1838    Specimen:  Urine, Catheter Updated:  05/03/20 1904     RBC, UA 0-2 /HPF      WBC, UA 6-12 /HPF      Bacteria, UA None Seen /HPF      Squamous Epithelial Cells, UA 3-6 /HPF      Transitional Epithelial Cells, UA 0-2 /HPF      Hyaline Casts, UA 0-2 /LPF      Mucus, UA Trace /HPF      Methodology Manual Light Microscopy    Urine Culture - Urine, Urine, Catheter [[id number]] Collected:  05/03/20 1838    Specimen:  Urine, Catheter Updated:  05/03/20 1904    Urinalysis With Culture If Indicated - Urine, Catheter [[id number]]  (Abnormal) Collected:  05/03/20 1838    Specimen:  Urine, Catheter Updated:  05/03/20 1850     Color, UA Dark Yellow     Comment: Result checked Visual Confirmation        Appearance, UA Clear     pH, UA 6.0     Specific Gravity, UA 1.028     Glucose, UA Negative     Ketones, UA 15 mg/dL (1+)     Bilirubin, UA Negative     Blood, UA Negative     Protein, UA 30 mg/dL (1+)     Leuk Esterase, UA Moderate (2+)     Nitrite, UA Negative     Urobilinogen, UA 1.0 E.U./dL    Troponin [[id number]]  (Normal) Collected:  05/03/20 1745    Specimen:  Blood Updated:  05/03/20 1819     Troponin T <0.010 ng/mL     Narrative:       Troponin T Reference Range:  <= 0.03 ng/mL-    Negative for AMI  >0.03 ng/mL-     Abnormal for myocardial necrosis.  Clinicians would have to utilize clinical acumen, EKG, Troponin and serial changes to determine if it is an Acute Myocardial Infarction or myocardial injury due to an underlying chronic condition.       Results may be falsely decreased if patient taking Biotin.      TSH [[id number]]  (Normal) Collected:  05/03/20 1745    Specimen:  Blood Updated:  05/03/20 1819     TSH 1.350 uIU/mL      Comment: TSH results may be falsely decreased if patient taking Biotin.       Comprehensive Metabolic Panel [[id number]]  (Abnormal) Collected:  05/03/20 1745    Specimen:  Blood Updated:  05/03/20 1815     Glucose 118 mg/dL      BUN 36 mg/dL      Creatinine 1.08 mg/dL      Sodium 151 mmol/L      Potassium 3.0 mmol/L      Chloride 113 mmol/L      CO2 23.0 mmol/L      Calcium 10.0 mg/dL      Total Protein 7.5 g/dL      Albumin 4.20 g/dL      ALT (SGPT) 48 U/L      AST (SGOT) 55 U/L      Alkaline Phosphatase 72 U/L      Total Bilirubin 0.5 mg/dL      eGFR Non African Amer 49 mL/min/1.73      Globulin 3.3 gm/dL      A/G Ratio 1.3 g/dL      BUN/Creatinine Ratio 33.3     Anion Gap 15.0 mmol/L     Narrative:       GFR Normal >60  Chronic Kidney Disease <60  Kidney Failure <15      Lipase [[id number]]  (Abnormal) Collected:  05/03/20 1745    Specimen:  Blood Updated:  05/03/20 1815     Lipase 88 U/L     Magnesium [[id number]]  (Abnormal) Collected:  05/03/20 1745    Specimen:  Blood Updated:  05/03/20 1815     Magnesium 2.5 mg/dL     CBC & Differential [[id number]] Collected:  05/03/20 1745    Specimen:  Blood Updated:  05/03/20 1752    Narrative:       The following orders were created for panel order CBC & Differential.  Procedure                               Abnormality         Status                     ---------                               -----------         ------                     CBC Auto Differential[[id number]]        Abnormal            Final result                  Please view results for these tests on the individual orders.    CBC Auto Differential [[id number]]  (Abnormal) Collected:  05/03/20 1745    Specimen:  Blood Updated:  05/03/20 1752     WBC 10.20 10*3/mm3      RBC 4.49 10*6/mm3      Hemoglobin 13.9 g/dL      Hematocrit 40.9 %      MCV 91.2 fL      MCH 31.1 pg      MCHC 34.1 g/dL      RDW 14.1 %      RDW-SD 45.5 fl      MPV 8.5 fL      Platelets 283 10*3/mm3      Neutrophil % 74.4 %      Lymphocyte % 18.3 %      Monocyte % 6.6 %      Eosinophil % 0.1 %      Basophil % 0.6 %      Neutrophils, Absolute 7.60 10*3/mm3      Lymphocytes, Absolute 1.90 10*3/mm3      Monocytes, Absolute 0.70 10*3/mm3      Eosinophils, Absolute 0.00 10*3/mm3      Basophils, Absolute 0.10 10*3/mm3      nRBC 0.1 /100 WBC         No results found for: HGBA1C            Lab Results   Component Value Date    LIPASE 88 (H) 05/03/2020     Lab Results   Component Value Date    CHOL 283 (H) 05/04/2020    CHLPL 209 (H) 09/27/2019    TRIG 132 05/04/2020    HDL 57 05/04/2020     (H) 05/04/2020       No results found for: INTRAOP, PREDX, FINALDX, COMDX    Microbiology Results (last 10 days)     ** No results found for the last 240 hours. **          ECG/EMG Results (most recent)     Procedure Component Value Units Date/Time    ECG 12 Lead [[id number]] Collected:  05/03/20 1757     Updated:  05/03/20 1759    Narrative:       HEART RATE= 66  bpm  RR Interval= 1060  ms  AK Interval= 61  ms  P Horizontal Axis=   deg  P Front Axis= 0  deg  QRSD Interval= 76  ms  QT Interval= 445  ms  QRS Axis= 51  deg  T Wave Axis= 257  deg  - ABNORMAL ECG -  Sinus rhythm  Multiple premature complexes, vent & supraven  Abnormal T, consider ischemia, diffuse leads  Electronically Signed By:   Date and Time of Study: 2020-05-03 17:57:57                    Ct Abdomen Pelvis Without Contrast    Result Date: 5/3/2020  1.Air within urinary bladder. Correlate for recent instrumentation or cystitis. 2.Fractures of bilateral  inferior pubic rami and left superior pubic rami that appear subacute. There also appears to be an insufficiency fracture involving the right sacral ala. Correlate with focal tenderness and history of recent trauma. 3.Small nonobstructing left renal stone. 4.Sigmoid diverticulosis. 5.6.4 cm left adnexal cyst, slightly increased from prior exam. Recommend follow-up pelvic ultrasound in one year.    Electronically Signed By-DR. Hira Rocha MD On:5/3/2020 8:13 PM This report was finalized on [id number] by DR. Hira Rocha MD.    Ct Head Without Contrast    Result Date: 5/3/2020  1.No acute intracranial process identified. 2.Findings suggestive of moderate chronic small vessel ischemic disease.  Electronically Signed By-DR. Hira Rocha MD On:5/3/2020 8:07 PM This report was finalized on [id number] by DR. Hira Rocha MD.    Xr Chest 1 View    Result Date: 5/3/2020  No acute cardiopulmonary process identified.  Electronically Signed By-DR. Hira Rocha MD On:5/3/2020 6:17 PM This report was finalized on [id number] by DR. Hira Rocha MD.          Xrays, labs reviewed personally by physician.    Medication Review:   I have reviewed the patient's current medication list      Scheduled Meds    amLODIPine 2.5 mg Oral Daily   aspirin 81 mg Oral Daily   atorvastatin 20 mg Oral Daily   calcium-vitamin D 1 tablet Oral Daily   citalopram 20 mg Oral Daily   donepezil 10 mg Oral Nightly   enoxaparin 40 mg Subcutaneous Daily   sodium chloride 10 mL Intravenous Q12H       Meds Infusions       Meds PRN  •  acetaminophen **OR** acetaminophen **OR** acetaminophen  •  aluminum-magnesium hydroxide-simethicone  •  Calcium Gluconate-NaCl **AND** calcium gluconate **AND** Calcium, Ionized  •  docusate sodium  •  hydrALAZINE  •  magnesium sulfate **OR** magnesium sulfate **OR** magnesium sulfate  •  melatonin  •  nitroglycerin  •  ondansetron **OR** ondansetron  •  potassium & sodium phosphates **OR**  potassium & sodium phosphates  •  potassium chloride  •  potassium chloride  •  [COMPLETED] Insert peripheral IV **AND** sodium chloride  •  sodium chloride    I personally reviewed patient's x-ray films    I personally reviewed patient's EKG strips     Assessment/Plan   Assessment/Plan     Active Hospital Problems    Diagnosis  POA   • **Weakness [R53.1]  Yes   • Hypernatremia [E87.0]  Yes   • CORNELIO (acute kidney injury) (CMS/HCC) [N17.9]  Yes   • Malnutrition (CMS/[facility]) [E46]  Yes   • Hypokalemia [E87.6]  Yes   • Alzheimer disease (CMS/HCC) [G30.9, F02.80]  Yes   • GERD (gastroesophageal reflux disease) [K21.9]  Yes   • Depression [F32.9]  Yes   • Hyperlipidemia [E78.5]  Yes   • Osteoarthritis [M19.90]  Yes   • Hypertension [I10]  Yes      Resolved Hospital Problems   No resolved problems to display.       MEDICAL DECISION MAKING COMPLEXITY BY PROBLEM:       1.  Increasing weakness with failure to thrive  -Family notes patient having increasing weakness, confusion and generalized failure to thrive over the past 3 days with significantly decreased p.o. Intake  -CT of the abdomen and pelvis reported with air in the urinary bladder, fractures of the bilateral inferior pubic rami and left superior pubic rami which appear to be subacute.  There also appears to be an insufficiency fracture involving the right sacral ala.    *A small nonobstructing left renal stone is noted as well as some sigmoid diverticulosis and  * a 6.4 cm left adnexal cyst slightly increased from prior exam.    -CT of the head without contrast reported as showing no acute intracranial process with findings suggestive of moderate chronic small vessel ischemic disease.    -EKG shows sinus arrhythmia at 66 with multiple premature supraventricular complexes noted as well as diffuse T wave inversion and a QTC of 459 ms.  - pyuria noted on UA though some contamination appears to be present, ceftriaxone given in the ED  -PT/OT consult  -N.p.o. pending  swallow study  -Continue fluids as below  -Case management consult placed for possible placement at discharge  -Patient family may benefit from palliative care consult to discuss future goals and treatment options     2.  CORNELIO (likely prerenal)  -Creatinine: 1.08, BUN: 36, BUN/creatinine ratio: 33.3 with an EGFR of 49  -1 L fluid bolus given in the ED  -Continue D5 w kcl  -ACEI, Mobic and PPI held  -Monitor BMP     3.  Hypernatremia  -ivf chnaged  -Monitor BMP     4.  Malnutrition (current BMI: 21.3)  -Patient appears to have lost approximately 65 pounds since 12/3/2019  -Swallow study ordered  -Nutrition consult     5.  Hypokalemia  -Potassium: 3.0  -Magnesium: 2.5  -Replacement protocol ordered     6.  Hypertension  -increase norvac     7.  Alzheimer's dementia  -Patient's mental condition noted is worsened from baseline per ED provider who spoke with family  -Continue Aricept     8.  GERD  -PPI held pending resolution of CORNELIO noted above       9.  Depression  -Continue Celexa     10.  Hyperlipidemia  -Continue statin     12.  Osteoarthritis  -CT reports multiple subacute fractures of the bilateral inferior pubic rami and left superior pubic rami                 VTE Prophylaxis -   Mechanical Order History:     None      Pharmalogical Order History:     Ordered     Dose Route Frequency Stop    05/03/20 2116  enoxaparin (LOVENOX) syringe 40 mg      40 mg SC Daily --            Code Status -   Code Status and Medical Interventions:   Ordered at: 05/03/20 2116     Level Of Support Discussed With:    Patient     Code Status:    CPR     Medical Interventions (Level of Support Prior to Arrest):    Full       This patient has been examined wearing appropriate Personal Protective Equipment and discussed with RN. 05/04/20        Discharge Planning          Destination      Coordination has not been started for this encounter.      Durable Medical Equipment      Coordination has not been started for this encounter.         Dialysis/Infusion      Coordination has not been started for this encounter.      Home Medical Care      Coordination has not been started for this encounter.      Therapy      Coordination has not been started for this encounter.      Community Resources      Coordination has not been started for this encounter.            Electronically signed by Daivd Suárez MD, 05/04/20, 10:02.  [facility]ist Team

## 2020-05-04 NOTE — ED PROVIDER NOTES
Subjective   Chief complaint Per family is weakness change in mental status no appetite    This is all through EMS and family is patient has dementia 80-year-old female with about a 3-day history of general decline no appetite not eating or drinking and increasing confusion.  No reported fever cough.  No reported injury.  The patient here has severe dementia unable to voice any complaints.  No reported paralysis or change in speech.  There is been no head injury.  Is been no vomiting reported.          Review of Systems   Unable to perform ROS: Dementia       Past Medical History:   Diagnosis Date   • Alzheimer disease (CMS/[facility])    • Arthritis    • Depression    • GERD (gastroesophageal reflux disease)    • Hypertension        No Known Allergies    Past Surgical History:   Procedure Laterality Date   • HYSTERECTOMY         Family History   Family history unknown: Yes       Social History     Socioeconomic History   • Marital status:      Spouse name: Not on file   • Number of children: Not on file   • Years of education: Not on file   • Highest education level: Not on file   Tobacco Use   • Smoking status: Never Smoker   • Smokeless tobacco: Never Used   Substance and Sexual Activity   • Alcohol use: Never     Frequency: Never   • Drug use: Never   • Sexual activity: Defer     Prior to Admission medications    Medication Sig Start Date End Date Taking? Authorizing Provider   aspirin 81 MG chewable tablet Chew 81 mg Daily.   Yes Vero Martin MD   pravastatin (PRAVACHOL) 80 MG tablet Take 80 mg by mouth Daily.   Yes Vero Martin MD   amLODIPine (NORVASC) 2.5 MG tablet Take 2.5 mg by mouth Daily.    Vero Martin MD   benazepril (LOTENSIN) 40 MG tablet Take 40 mg by mouth Daily.    Vero Martin MD   calcium carb-cholecalciferol (CALCIUM 600+D) 600-800 MG-UNIT tablet Take 1 tablet by mouth Daily.    Vero Martin MD   citalopram (CeleXA) 20 MG tablet Take 20 mg by mouth  Daily.    Vero Martin MD   donepezil (ARICEPT) 10 MG tablet Take 10 mg by mouth Every Night.    Vero Martin MD   meloxicam (MOBIC) 15 MG tablet Take 15 mg by mouth Daily.    Vero Martin MD   omeprazole (priLOSEC) 20 MG capsule Take 20 mg by mouth Daily.    Vero Martin MD           Objective   Physical Exam  80-year-old female awake alert very confused HEENT extraocular muscles intact pupils equal round reactive mouth clear no obvious trauma no raccoon or liriano sign neck supple no adenopathy no adrenal bruits lungs clear no retractions heart regular without murmur abdomen soft with diffuse tenderness but no rebound no guarding good bowel sounds no peritoneal findings no pulsatile masses extremities pulses are equal foot upper and lower extremities no edema cords or Homans sign no evidence of DVT no cellulitic changes.  Patient is awake alert she is very confused does not follow commands but she does move everything when extremity stimulated does not appear to have focal deficits  Procedures           ED Course      Results for orders placed or performed during the hospital encounter of 05/03/20   Comprehensive Metabolic Panel   Result Value Ref Range    Glucose 118 (H) 65 - 99 mg/dL    BUN 36 (H) 8 - 23 mg/dL    Creatinine 1.08 (H) 0.57 - 1.00 mg/dL    Sodium 151 (H) 136 - 145 mmol/L    Potassium 3.0 (L) 3.5 - 5.2 mmol/L    Chloride 113 (H) 98 - 107 mmol/L    CO2 23.0 22.0 - 29.0 mmol/L    Calcium 10.0 8.6 - 10.5 mg/dL    Total Protein 7.5 6.0 - 8.5 g/dL    Albumin 4.20 3.50 - 5.20 g/dL    ALT (SGPT) 48 (H) 1 - 33 U/L    AST (SGOT) 55 (H) 1 - 32 U/L    Alkaline Phosphatase 72 39 - 117 U/L    Total Bilirubin 0.5 0.2 - 1.2 mg/dL    eGFR Non African Amer 49 (L) >60 mL/min/1.73    Globulin 3.3 gm/dL    A/G Ratio 1.3 g/dL    BUN/Creatinine Ratio 33.3 (H) 7.0 - 25.0    Anion Gap 15.0 5.0 - 15.0 mmol/L   Lipase   Result Value Ref Range    Lipase 88 (H) 13 - 60 U/L   Urinalysis With  Culture If Indicated - Urine, Catheter   Result Value Ref Range    Color, UA Dark Yellow (A) Yellow, Straw    Appearance, UA Clear Clear    pH, UA 6.0 5.0 - 8.0    Specific Gravity, UA 1.028 1.005 - 1.030    Glucose, UA Negative Negative    Ketones, UA 15 mg/dL (1+) (A) Negative    Bilirubin, UA Negative Negative    Blood, UA Negative Negative    Protein, UA 30 mg/dL (1+) (A) Negative    Leuk Esterase, UA Moderate (2+) (A) Negative    Nitrite, UA Negative Negative    Urobilinogen, UA 1.0 E.U./dL 0.2 - 1.0 E.U./dL   Troponin   Result Value Ref Range    Troponin T <0.010 0.000 - 0.030 ng/mL   Magnesium   Result Value Ref Range    Magnesium 2.5 (H) 1.6 - 2.4 mg/dL   TSH   Result Value Ref Range    TSH 1.350 0.270 - 4.200 uIU/mL   CBC Auto Differential   Result Value Ref Range    WBC 10.20 3.40 - 10.80 10*3/mm3    RBC 4.49 3.77 - 5.28 10*6/mm3    Hemoglobin 13.9 12.0 - 15.9 g/dL    Hematocrit 40.9 34.0 - 46.6 %    MCV 91.2 79.0 - 97.0 fL    MCH 31.1 26.6 - 33.0 pg    MCHC 34.1 31.5 - 35.7 g/dL    RDW 14.1 12.3 - 15.4 %    RDW-SD 45.5 37.0 - 54.0 fl    MPV 8.5 6.0 - 12.0 fL    Platelets 283 140 - 450 10*3/mm3    Neutrophil % 74.4 42.7 - 76.0 %    Lymphocyte % 18.3 (L) 19.6 - 45.3 %    Monocyte % 6.6 5.0 - 12.0 %    Eosinophil % 0.1 (L) 0.3 - 6.2 %    Basophil % 0.6 0.0 - 1.5 %    Neutrophils, Absolute 7.60 (H) 1.70 - 7.00 10*3/mm3    Lymphocytes, Absolute 1.90 0.70 - 3.10 10*3/mm3    Monocytes, Absolute 0.70 0.10 - 0.90 10*3/mm3    Eosinophils, Absolute 0.00 0.00 - 0.40 10*3/mm3    Basophils, Absolute 0.10 0.00 - 0.20 10*3/mm3    nRBC 0.1 0.0 - 0.2 /100 WBC   Urinalysis, Microscopic Only - Urine, Catheter   Result Value Ref Range    RBC, UA 0-2 (A) None Seen /HPF    WBC, UA 6-12 (A) None Seen /HPF    Bacteria, UA None Seen None Seen /HPF    Squamous Epithelial Cells, UA 3-6 (A) None Seen, 0-2 /HPF    Transitional Epithelial Cells, UA 0-2 0 - 2 /HPF    Hyaline Casts, UA 0-2 None Seen /LPF    Mucus, UA Trace None Seen,  Trace /HPF    Methodology Manual Light Microscopy      Ct Abdomen Pelvis Without Contrast    Result Date: 5/3/2020  1.Air within urinary bladder. Correlate for recent instrumentation or cystitis. 2.Fractures of bilateral inferior pubic rami and left superior pubic rami that appear subacute. There also appears to be an insufficiency fracture involving the right sacral ala. Correlate with focal tenderness and history of recent trauma. 3.Small nonobstructing left renal stone. 4.Sigmoid diverticulosis. 5.6.4 cm left adnexal cyst, slightly increased from prior exam. Recommend follow-up pelvic ultrasound in one year.    Electronically Signed By-DR. Hira Rocha MD On:5/3/2020 8:13 PM This report was finalized on [id number] by DR. Hira Rocha MD.    Ct Head Without Contrast    Result Date: 5/3/2020  1.No acute intracranial process identified. 2.Findings suggestive of moderate chronic small vessel ischemic disease.  Electronically Signed By-DR. Hira Rocha MD On:5/3/2020 8:07 PM This report was finalized on [id number] by DR. Hira Rocha MD.    Xr Chest 1 View    Result Date: 5/3/2020  No acute cardiopulmonary process identified.  Electronically Signed By-DR. Hira Rocha MD On:5/3/2020 6:17 PM This report was finalized on [id number] by DR. Hira Rocha MD.    Medications   sodium chloride 0.9 % flush 10 mL (has no administration in time range)   cefTRIAXone (ROCEPHIN) in SWFI 1 gram/10ml IV PUSH syringe (has no administration in time range)   sodium chloride 0.9 % bolus 1,000 mL (0 mL Intravenous Stopped 5/3/20 1833)     EKG my interpretation normal sinus rhythm rate of 66 normal axis no pressures diffuse T wave inversion noted throughout the anterior lateral leads which is new from previous EKG QTC of 459 abnormal                                       MDM  Number of Diagnoses or Management Options  Altered mental status, unspecified altered mental status type:   Hypokalemia:   Urinary  tract infection without hematuria, site unspecified:   Weakness:   Diagnosis management comments: Medical decision making.  Patient IV established placed on the monitor and had the above exam evaluation she was given a liter of saline.  Patient's chest x-ray was unremarkable chemistries BUN 36 creatinine 1.08 blood sugar 118 ALT 48 AST 55 lipase was 88 urine showed moderate leukocyte with about 12 white cells CBC unremarkable white blood cell count was 10.2.  The patient repeat exam was resting comfortably.  She was given Rocephin 1 g IV for possible UTI.  She has had a few day history of altered mental status general weakness no appetite etiology is not quite clear she has some possible urinary tract infection has an abnormal EKG but negative troponin.  Hospitalist physician assistant notified.  And the patient will be admitted to the hospital for further work-up and care.  See no evidence to suggest sepsis by clinical exam on repeat examination was soft without tenderness and no peritoneal findings.       Amount and/or Complexity of Data Reviewed  Clinical lab tests: reviewed  Tests in the radiology section of CPT®: reviewed  Tests in the medicine section of CPT®: reviewed        Final diagnoses:   Weakness   Urinary tract infection without hematuria, site unspecified   Hypokalemia   Altered mental status, unspecified altered mental status type            Otto Taylor MD  05/03/20 8222

## 2020-05-04 NOTE — PLAN OF CARE
Problem: Patient Care Overview  Goal: Plan of Care Review  Outcome: Ongoing (interventions implemented as appropriate)  Flowsheets  Taken 5/4/2020 1303 by Love Camacho PT  Plan of Care Reviewed With: patient  Taken 5/4/2020 1306 by Julita Wren, SLP  Outcome Summary: Clinical swallow evaluation completed on this date.  Patient accepting of trial of thin water by straw, applesauce, peaches and cracker.  Slow but functional mastication noted for peaches and cracker.  Minimal oral residue post swallow.  Patient takes multiple consecutive sips of water by straw no coughing, throat clearing or wet vocal quality following.  Timely oral transit noted for all consistencies.  No overt s/s of aspiration with any trial.  Recommend a soft to chew and thin liquid diet. PPE: gloves, mask, faceshield

## 2020-05-04 NOTE — PLAN OF CARE
Problem: Patient Care Overview  Goal: Plan of Care Review  Outcome: Ongoing (interventions implemented as appropriate)  Flowsheets (Taken 5/4/2020 1303)  Plan of Care Reviewed With: patient  Outcome Summary: Pt is 81 yo female admitted for AMS, weakness, UTI, hypokalemia.  CT head (-).  CT abdomen showing b/l inferior pubic rami fx that appear subacute.  Pt with PMH of Alzheimer's dementia.   reports via phone that pt was ambulating with SUP in house and has 24/7 assist.  Pt requiring Theodore-modA for safety with bed mobility and transfers.  Pt exhibits impaired cognitive functioning and not oriented to full name, time, or situation.  Pt far from functional mobility baseline and PT is recommending IP rehab to address deficits.  However if pt's  is wanting to take pt home, PT is recommending home with 24/7 assist and HHPT.  PPE donned: mask with faceshield, gloves.

## 2020-05-05 LAB
ANION GAP SERPL CALCULATED.3IONS-SCNC: 11 MMOL/L (ref 5–15)
BACTERIA SPEC AEROBE CULT: NO GROWTH
BASOPHILS # BLD AUTO: 0 10*3/MM3 (ref 0–0.2)
BASOPHILS NFR BLD AUTO: 0.2 % (ref 0–1.5)
BUN BLD-MCNC: 20 MG/DL (ref 8–23)
BUN/CREAT SERPL: 29 (ref 7–25)
CALCIUM SPEC-SCNC: 9 MG/DL (ref 8.6–10.5)
CHLORIDE SERPL-SCNC: 109 MMOL/L (ref 98–107)
CO2 SERPL-SCNC: 21 MMOL/L (ref 22–29)
CREAT BLD-MCNC: 0.69 MG/DL (ref 0.57–1)
DEPRECATED RDW RBC AUTO: 47.3 FL (ref 37–54)
EOSINOPHIL # BLD AUTO: 0.4 10*3/MM3 (ref 0–0.4)
EOSINOPHIL NFR BLD AUTO: 3.6 % (ref 0.3–6.2)
ERYTHROCYTE [DISTWIDTH] IN BLOOD BY AUTOMATED COUNT: 14.3 % (ref 12.3–15.4)
GFR SERPL CREATININE-BSD FRML MDRD: 82 ML/MIN/1.73
GLUCOSE BLD-MCNC: 97 MG/DL (ref 65–99)
HCT VFR BLD AUTO: 37.1 % (ref 34–46.6)
HGB BLD-MCNC: 12.5 G/DL (ref 12–15.9)
LYMPHOCYTES # BLD AUTO: 2.8 10*3/MM3 (ref 0.7–3.1)
LYMPHOCYTES NFR BLD AUTO: 25.6 % (ref 19.6–45.3)
MAGNESIUM SERPL-MCNC: 2.1 MG/DL (ref 1.6–2.4)
MCH RBC QN AUTO: 31.3 PG (ref 26.6–33)
MCHC RBC AUTO-ENTMCNC: 33.8 G/DL (ref 31.5–35.7)
MCV RBC AUTO: 92.6 FL (ref 79–97)
MONOCYTES # BLD AUTO: 0.7 10*3/MM3 (ref 0.1–0.9)
MONOCYTES NFR BLD AUTO: 6.7 % (ref 5–12)
NEUTROPHILS # BLD AUTO: 7 10*3/MM3 (ref 1.7–7)
NEUTROPHILS NFR BLD AUTO: 63.9 % (ref 42.7–76)
NRBC BLD AUTO-RTO: 0.1 /100 WBC (ref 0–0.2)
PHOSPHATE SERPL-MCNC: 2.3 MG/DL (ref 2.5–4.5)
PLATELET # BLD AUTO: 221 10*3/MM3 (ref 140–450)
PMV BLD AUTO: 8.6 FL (ref 6–12)
POTASSIUM BLD-SCNC: 3.6 MMOL/L (ref 3.5–5.2)
RBC # BLD AUTO: 4 10*6/MM3 (ref 3.77–5.28)
SODIUM BLD-SCNC: 141 MMOL/L (ref 136–145)
WBC NRBC COR # BLD: 10.9 10*3/MM3 (ref 3.4–10.8)

## 2020-05-05 PROCEDURE — 25010000002 CEFTRIAXONE PER 250 MG: Performed by: INTERNAL MEDICINE

## 2020-05-05 PROCEDURE — 99232 SBSQ HOSP IP/OBS MODERATE 35: CPT | Performed by: INTERNAL MEDICINE

## 2020-05-05 PROCEDURE — 97530 THERAPEUTIC ACTIVITIES: CPT

## 2020-05-05 PROCEDURE — 84100 ASSAY OF PHOSPHORUS: CPT | Performed by: PHYSICIAN ASSISTANT

## 2020-05-05 PROCEDURE — 97535 SELF CARE MNGMENT TRAINING: CPT

## 2020-05-05 PROCEDURE — 92526 ORAL FUNCTION THERAPY: CPT

## 2020-05-05 PROCEDURE — 80048 BASIC METABOLIC PNL TOTAL CA: CPT | Performed by: PHYSICIAN ASSISTANT

## 2020-05-05 PROCEDURE — 99221 1ST HOSP IP/OBS SF/LOW 40: CPT | Performed by: NURSE PRACTITIONER

## 2020-05-05 PROCEDURE — 25010000002 POTASSIUM CHLORIDE PER 2 MEQ OF POTASSIUM: Performed by: INTERNAL MEDICINE

## 2020-05-05 PROCEDURE — 85025 COMPLETE CBC W/AUTO DIFF WBC: CPT | Performed by: PHYSICIAN ASSISTANT

## 2020-05-05 PROCEDURE — 83735 ASSAY OF MAGNESIUM: CPT | Performed by: PHYSICIAN ASSISTANT

## 2020-05-05 PROCEDURE — 25010000002 ENOXAPARIN PER 10 MG: Performed by: PHYSICIAN ASSISTANT

## 2020-05-05 RX ADMIN — Medication 10 ML: at 09:03

## 2020-05-05 RX ADMIN — POTASSIUM CHLORIDE 75 ML/HR: 149 INJECTION, SOLUTION, CONCENTRATE INTRAVENOUS at 15:57

## 2020-05-05 RX ADMIN — POTASSIUM CHLORIDE 75 ML/HR: 149 INJECTION, SOLUTION, CONCENTRATE INTRAVENOUS at 01:00

## 2020-05-05 RX ADMIN — CEFTRIAXONE SODIUM 1 G: 1 INJECTION, POWDER, FOR SOLUTION INTRAMUSCULAR; INTRAVENOUS at 21:26

## 2020-05-05 RX ADMIN — DONEPEZIL HYDROCHLORIDE 10 MG: 5 TABLET, FILM COATED ORAL at 21:26

## 2020-05-05 RX ADMIN — CITALOPRAM HYDROBROMIDE 20 MG: 20 TABLET ORAL at 11:03

## 2020-05-05 RX ADMIN — ATORVASTATIN CALCIUM 20 MG: 20 TABLET, FILM COATED ORAL at 08:21

## 2020-05-05 RX ADMIN — Medication 10 ML: at 21:26

## 2020-05-05 RX ADMIN — AMLODIPINE BESYLATE 5 MG: 5 TABLET ORAL at 08:21

## 2020-05-05 RX ADMIN — OYSTER SHELL CALCIUM WITH VITAMIN D 1 TABLET: 500; 200 TABLET, FILM COATED ORAL at 08:21

## 2020-05-05 RX ADMIN — ENOXAPARIN SODIUM 40 MG: 40 INJECTION SUBCUTANEOUS at 18:12

## 2020-05-05 RX ADMIN — ASPIRIN 81 MG 81 MG: 81 TABLET ORAL at 08:21

## 2020-05-05 NOTE — THERAPY TREATMENT NOTE
Acute Care - Speech Language Pathology   Swallow Treatment Note  Denis     Patient Name: Taylor Price  : 1940  MRN: [id number]  Today's Date: 2020               Admit Date: 5/3/2020    Visit Dx:      ICD-10-CM ICD-9-CM   1. Weakness R53.1 780.79   2. Urinary tract infection without hematuria, site unspecified N39.0 599.0   3. Hypokalemia E87.6 276.8   4. Altered mental status, unspecified altered mental status type R41.82 780.97     Patient Active Problem List   Diagnosis   • Nondisplaced zone i fracture of sacrum, initial encounter for closed fracture (CMS/[facility])   • Hyperlipidemia   • Hypertension   • Dementia of the Alzheimer's type with late onset without behavioral disturbance (CMS/[facility])   • Osteoarthritis   • Depression   • Age-related physical debility   • Delirium   • GERD (gastroesophageal reflux disease)   • Alzheimer disease (CMS/[facility])   • Weakness   • Hypernatremia   • CORNELIO (acute kidney injury) (CMS/[facility])   • Malnutrition (CMS/[facility])   • Hypokalemia       Therapy Treatment  Rehabilitation Treatment Summary     Row Name 20 1400          Discipline  speech language pathologist  -    Document Type  therapy note (daily note)  -    Subjective Information  no complaints  -    Mode of Treatment  individual therapy;speech-language pathology  -    Patient/Family Observations  Pt sleeping in bed upon SLP arrival to room but easily aroused for meal. Pt responded to yes/no questions but required consistent cues to participate in meal. Pt required max assistance with feeding.   -    Therapy Frequency (Swallow)  PRN  -    Patient Effort  good  -    Treatment Considerations/Comments  PPE worn: surgical mask, face shield, gloves  -    Patient Response to Treatment  Pt seen for dysphagia therapy during lunch time to complete meal assessment and ensure diet tolerance. Pt sleeping in bed but easily aroused to verbal and tactile cues. Pt confused and required consistent prompts and direction  "throughout the meal. Pt repositioned in bed and HOB elevated to upright position. Pt answered \"yes\" when asked if she was hungry and if she wanted to eat. However, pt did not attempt to feed herself or  any items on her tray so SLP fed pt items from her lunch tray. Pt consumed tuna salad sandwich and tea via straw. Pt with slow but functional mastication with soft to chew items. Pt able to clear oral cavity independently and no oral residue observed. No overt s/s of aspiration with any items consumed. Vocal quality remained clear following all trials. Recommend continuing soft to chew diet and thin liquids. Pt will need to be a full feed for all meals 2/2 cognitive status. Discussed recs with RN who verbalized understanding and agreement. Pt may be on least restrictive diet at this time. Will continue to follow pt for diet tolerance and make further recommendations.   -    Recorded by [] Luci Ruffin SLP 05/05/20 1454    Row Name 05/05/20 1400          Daily Summary of Progress (SLP)  progress toward functional goals as expected  -    Barriers to Overall Progress (SLP)  Cognitive status  -    Plan for Continued Treatment (SLP)  Continue soft to chew and thins. Pt will need to be a full feed. Will continue to follow for tolerance and to make further recs as appropriate.  -    Recorded by [] Luci Ruffin SLP 05/05/20 3295      User Key  (r) = Recorded By, (t) = Taken By, (c) = Cosigned By    Initials Name Effective Dates Discipline     Luci Ruffin SLP 06/17/19 -  SLP          Outcome Summary  Outcome Summary/Treatment Plan (SLP)  Daily Summary of Progress (SLP): progress toward functional goals as expected (05/05/20 1400 : Luci Ruffin SLP)  Barriers to Overall Progress (SLP): Cognitive status (05/05/20 1400 : Luci Ruffin SLP)  Plan for Continued Treatment (SLP): Continue soft to chew and thins. Pt will need to be a full feed. Will continue to follow for tolerance and to make " further recs as appropriate. (05/05/20 1400 : Luci Ruffin, SLP)      SLP GOALS     Row Name 05/05/20 1400 05/04/20 1200          Oral Nutrition/Hydration Goal 1 (SLP)    Oral Nutrition/Hydration Goal 1, SLP  Patient will be seen at a meal within 24-48 hours to assess tolerance of current diet with further recommendations to be given as indicated.  -MF  Patient will be seen at a meal within 24-48 hours to assess tolerance of current diet with further recommendations to be given as indicated.  -MM     Time Frame (Oral Nutrition/Hydration Goal 1, SLP)  2 days  -MF  2 days  -MM     Barriers (Oral Nutrition/Hydration Goal 1, SLP)  Saw at lunch this date. See above note.  -MF  --     Progress/Outcomes (Oral Nutrition/Hydration Goal 1, SLP)  goal ongoing  -MF  --        Oral Nutrition/Hydration Goal 2 (SLP)    Oral Nutrition/Hydration Goal 2, SLP  Patient will tolearting safest and least restrictive diet with no complications from aspiration.   -MF  Patient will tolearting safest and least restrictive diet with no complications from aspiration.   -MM     Time Frame (Oral Nutrition/Hydration Goal 2, SLP)  by discharge  -MF  by discharge  -MM     Barriers (Oral Nutrition/Hydration Goal 2, SLP)  Tolerating soft to chew and thins. Will continue to follow for diet tolerance.  -MF  --     Progress/Outcomes (Oral Nutrition/Hydration Goal 2, SLP)  goal ongoing  -MF  --       User Key  (r) = Recorded By, (t) = Taken By, (c) = Cosigned By    Initials Name Provider Type    Julita Gray, SLP Speech and Language Pathologist    Luci Siegel, PHILL Speech and Language Pathologist          EDUCATION  The patient has been educated in the following areas:   Modified Diet Instruction. Pt unable to verbalize understanding.     SLP Recommendation and Plan  Daily Summary of Progress (SLP): progress toward functional goals as expected  Barriers to Overall Progress (SLP): Cognitive status  Plan for Continued Treatment (SLP):  Continue soft to chew and thins. Pt will need to be a full feed. Will continue to follow for tolerance and to make further recs as appropriate.                      Therapy Charges for Today     Code Description Service Date Service Provider Modifiers Qty    00174777677 HC ST TREATMENT SWALLOW 4 5/5/2020 Luci Ruffin SLP GN 1                 PHILL Haji  5/5/2020

## 2020-05-05 NOTE — PLAN OF CARE
Problem: Patient Care Overview  Goal: Plan of Care Review  Outcome: Ongoing (interventions implemented as appropriate)  Flowsheets  Taken 5/4/2020 0255  Progress: no change  Taken 5/4/2020 1935  Plan of Care Reviewed With: patient  Note:   Patient was evaluated by speech yesterday for swallowing study, patient did well and showed no signs of aspiration. It is recommended that the patient be on a soft to chew diet with thin liquids.   Goal: Individualization and Mutuality  Outcome: Ongoing (interventions implemented as appropriate)  Goal: Discharge Needs Assessment  Outcome: Ongoing (interventions implemented as appropriate)  Flowsheets (Taken 5/4/2020 1455 by Torsten Alves RN)  Discharge Coordination/Progress: DC Plan: Pending PT/OT - probable placement for inpt rehab.  Equipment Currently Used at Home: walker, rolling;commode  Anticipated Changes Related to Illness: inability to care for self  Concerns Comments: PT/OT pending - may require placement.  Transportation Anticipated: family or friend will provide  Transportation Concerns: car, none  Concerns to be Addressed: discharge planning  Readmission Within the Last 30 Days: no previous admission in last 30 days  Patient/Family Anticipated Services at Transition: none  Patient/Family Anticipates Transition to: home with family  Goal: Interprofessional Rounds/Family Conf  Outcome: Ongoing (interventions implemented as appropriate)     Problem: Fall Risk (Adult)  Goal: Identify Related Risk Factors and Signs and Symptoms  Outcome: Ongoing (interventions implemented as appropriate)  Goal: Absence of Fall  Outcome: Ongoing (interventions implemented as appropriate)  Flowsheets (Taken 5/4/2020 0255)  Absence of Fall: making progress toward outcome     Problem: Pain, Acute (Adult)  Goal: Identify Related Risk Factors and Signs and Symptoms  Outcome: Ongoing (interventions implemented as appropriate)  Goal: Acceptable Pain Control/Comfort Level  Outcome: Ongoing  (interventions implemented as appropriate)  Flowsheets (Taken 5/4/2020 0255)  Acceptable Pain Control/Comfort Level: making progress toward outcome     Problem: Skin Injury Risk (Adult)  Goal: Identify Related Risk Factors and Signs and Symptoms  Outcome: Ongoing (interventions implemented as appropriate)  Flowsheets (Taken 5/4/2020 0255)  Related Risk Factors (Skin Injury Risk): cognitive impairment;advanced age;fluid intake inadequate  Goal: Skin Health and Integrity  Outcome: Ongoing (interventions implemented as appropriate)  Flowsheets (Taken 5/4/2020 0255)  Skin Health and Integrity: making progress toward outcome

## 2020-05-05 NOTE — PROGRESS NOTES
Continued Stay Note  OSBALDO Barrios     Patient Name: Taylor Price  MRN: [id number]  Today's Date: 5/5/2020    Admit Date: 5/3/2020    Discharge Plan     Row Name 05/05/20 1635       Plan    Plan  DC Plan: Home with [facility], pending acceptance. Spouse declining inpt rehab.                     Torsten Alves RN

## 2020-05-05 NOTE — PLAN OF CARE
Problem: Patient Care Overview  Goal: Plan of Care Review  Outcome: Ongoing (interventions implemented as appropriate)  Flowsheets (Taken 5/5/2020 1439)  Outcome Summary: Pt continues to demo severe confusion and has little participation in therapy.  She requires mod-max assist for trasnfers.  She is far below reported baseline and will require IP rehab at discharge.  PPE: mask, glvoes, faceshield.

## 2020-05-05 NOTE — CONSULTS
Nutrition Services    Patient Name:  Taylor Price  YOB: 1940  MRN: [id number]  Admit Date:  5/3/2020    Comments: Soft to Chew diet per SLP evaluation with Boost Plus BID to provide 720kcal and 28g protein if consumed.    Severe chronic disease malnutrition related to inadequate calorie intake with dementia as evidenced by 38% weight loss in less than 6 months with family report of poor po intake for greater than 1 month PTA.        Reason for Assessment                Reason for Assessment    Reason For Assessment 5/5/20: MSA, MST 5       Diagnosis H&P:   80 y.o. female with past medical history of Alzheimer's, depression, GERD, hyperlipidemia, hypertension and osteoarthritis who presents to [facility] via ambulance after patient's family noted she was having increasing weakness and failure to thrive. ED provider spoke with patient's family who state for the past several days she has been unwilling to move from her recliner and when attempted to give any p.o. intake she either pushes it away or spits it out.     Current Problems:   Increasing weakness with FTT  -Palliative consult, continue aggressive care    CORNELIO (likely prerenal)  -IV fluids    Hypernatremia  -IV fluids    Malnutrition  -down 65# since 12/2019  -refusing po intake PTA    Hypokalemia  -replacement    Alzheimer's dementia  -currently worse that baseline    GERD    Depression    HLD    Osteoarthritis  -CT shows multiple fractures         Nutrition/Diet History                Nutrition/Diet History    Narrative     5/5: Patient is inappropriate for telephone interview, attempted to reach RN without response. Chart reviewed.       Functional Status Currently dependent       Food Allergies  NKFA       Factors Affecting Nutritional Intake  confusion         Anthropometrics             Anthropometrics    Height 152.4cm, 60in    Weight 49.5kg, 109lb, 5/5       Admit Weight    Admit Weight 109lb, 5/3       Ideal Body Weight (IBW)     Ideal Body Weight (IBW) 100lb    % Ideal Body Weight 109%       Usual Body Weight (UBW)    Usual Body Weight ELISEO, though appears from hx last weight was 175#    % Usual Body Weight 62%    Weight Hx  12/2019 175lb       Body Mass Index (BMI)    BMI (kg/m2) 21.33           Labs/Medications                Labs    Pertinent Lab Results Comments Phos 2.3 low, Total Chol 283 elev        Medications    Pertinent Medications Comments ASA, Rocephin, [facility]+D, IV fluids (D5 with KCl), Phos-Nak         Physical Findings                Physical Findings    Overall Physical Appearance Unable to assess in person related to COVID-19 pandemic and working remotely.     Assessment completed with the aid of nursing communication and EMR.     Edema  None documented     Gastrointestinal Last BM 5/5     Tubes none     Oral/Mouth Cavity SLP assessment completed, Soft to Chew diet     Skin No breakdown noted         Estimated/Assessed Needs              Calorie Requirements     Height Used for Calorie Calculations       Weight Used for Calorie Calculations      Formula chosen for Calorie Calculations       Estimated Calorie Requirements (kcals/day)              Protein Requirements    Weight Used For Protein Calculations       Est Protein Requirement Amount (gms/kg)       Estimated Protein Requirements (gms/day)          Fluid Requirements     Estimated Fluid Requirements (mL/day)            Fluid Deficit       Desired Sodium Level (mEq/L)      Desired Sodium Level (mEq/L)      Estimated Fluid Deficit Needs (L)           Nutrition Prescription Ordered                Nutrition Prescription PO    Current PO Diet  Soft to chew, full feed     Supplement         Nutrition Prescription EN    Enteral Route -     TF modular -     TF Delivery Method -     Current Ordered TF  -     Current Ordered Water flush  -     TF Observation  -        Nutrition Prescription TPN    TPN Route -     Current Ordered TPN Volume  -     Dextrose (gm/kcals)  -      Amino Acid (gm/kcals) -     Lipids (ML/Concentration/Frequency)  -     TPN Observation  -          Evaluation of Received Nutrient/Fluid Intake                PO Evaluation    % PO Intake 50% x1 recorded meal        EN Evaluation    TF Changes -     TF Residual -     TF Tolerance      Average EN Delivered  -        TPN Evaluation    Total Number of Days on TPN  -           Clinical Course      Clinical Nutrition Course Details  5/3 NPO  5/5 Soft to Chew, full feed         Problem/Interventions:                     Nutrition Diagnoses Problem 1      Problem 1   Unintentional weight loss related to inadequate calorie intake PTA as evidenced by family report of poor po intake PTA and weight loss of 38% in 5 months.     Nutrition Diagnoses Problem 2      Problem 2 Severe chronic disease malnutrition related to inadequate calorie intake with dementia as evidenced by 38% weight loss in less than 6 months with family report of poor po intake for greater than 1 month PTA.             Intervention Goal                Intervention Goal    General Stable weight.    Meal intake greater than 50%         Nutrition Intervention                Nutrition Intervention    RD/Tech Action Add Boost Plus BID         Nutrition Prescription                Nutrition Prescription PO      Diet  Soft to Chew     Supplement Boost Plus BID        Nutrition Prescription EN    Enteral Prescription -        Nutrition Prescription TPN    TPN Prescription -         Monitor/Evaluation                Monitor/Evaluation    Monitor Intake, plan of care, weight, BM, skin, labs           Electronically signed by:  Tiesha Curry RD  05/05/20 14:48

## 2020-05-05 NOTE — CONSULTS
"Palliative Care Consultation    Patient Code Status    Code Status and Medical Interventions:   Ordered at: 05/03/20 2116     Level Of Support Discussed With:    Patient     Code Status:    CPR     Medical Interventions (Level of Support Prior to Arrest):    Full       Requesting clinician:  Consulting Physician(s)     Provider Relationship Specialty    Lenard John LSW Consulting Physician Palliative Care    Jose Armando, David Miller MD Consulting Physician Internal Medicine        Reason for consult: Consultation for clarification of goals of care and code status.      Chief Complaint    Weakness  Mental status change    History of Present Illness    Taylor Price is a 80 y.o. female who presented to the ED on 5/3/20 with weakness and family concerns for changes in mental status.  CT scan of the head on admission was negative for any acute changes  Patient did rule in for UTI and has been placed on antibiotics.  Patient is incontinent of stool and urine. She is alert to her surroundings but does not engage in conversation.  Discussed with nurse.  with palliative care spoke with spouse who cares for the patient at home.  Diagnosed with UTI and failure to thrive with IV antibiotics started.  Spouse noted fall in December 2019 while at [facility] that required hospitalization.  Physical exam noted right anterior chest with appx 4 \" bruising and left outer thigh with small 1\" bruise.  Emotional support and disease process education provided.    Advanced Care Planning    Advanced Directives: Patient does not have advance directive  Health Care Directive on file: Other (Comment)  Health Care Surrogate:      Comments: no    Assessment/Plan   Weakness  Mental status change / negative CT head  UTI / antibiotics  Passed swallow evaluation  Dementia    Principal Problem:    Weakness  Active Problems:    Hyperlipidemia    Hypertension    Osteoarthritis    Depression    GERD (gastroesophageal reflux " disease)    Alzheimer disease (CMS/HCC)    Hypernatremia    CORNELIO (acute kidney injury) (CMS/[facility])    Malnutrition (CMS/[facility])    Hypokalemia      Plan    Passed swallow study  PT / OT consulted  IV antibiotics  Spouse wants continued aggressive treatment    Review of Systems   Unable to perform ROS: Dementia         Past Medical History:   Diagnosis Date   • Alzheimer disease (CMS/HCC)    • Arthritis    • Depression    • GERD (gastroesophageal reflux disease)    • Hypertension      Past Surgical History:   Procedure Laterality Date   • HYSTERECTOMY       Family History   Family history unknown: Yes     Social History     Tobacco Use   • Smoking status: Never Smoker   • Smokeless tobacco: Never Used   Substance Use Topics   • Alcohol use: Never     Frequency: Never   • Drug use: Never     Medications Prior to Admission   Medication Sig Dispense Refill Last Dose   • amLODIPine (NORVASC) 2.5 MG tablet Take 2.5 mg by mouth Daily.   Unknown at Unknown time   • aspirin 81 MG chewable tablet Chew 81 mg Daily.   Unknown at Unknown time   • benazepril (LOTENSIN) 40 MG tablet Take 40 mg by mouth Daily.   Unknown at Unknown time   • calcium carb-cholecalciferol (CALCIUM 600+D) 600-800 MG-UNIT tablet Take 1 tablet by mouth Daily.   Unknown at Unknown time   • citalopram (CeleXA) 20 MG tablet Take 20 mg by mouth Daily.   Unknown at Unknown time   • donepezil (ARICEPT) 10 MG tablet Take 10 mg by mouth Every Night.   Unknown at Unknown time   • meloxicam (MOBIC) 15 MG tablet Take 15 mg by mouth Daily.   Unknown at Unknown time   • omeprazole (priLOSEC) 20 MG capsule Take 20 mg by mouth Daily.   Unknown at Unknown time   • pravastatin (PRAVACHOL) 80 MG tablet Take 80 mg by mouth Daily.   Unknown at Unknown time       Allergies  Patient has no known allergies.    Scheduled Meds:    amLODIPine 5 mg Oral Daily   aspirin 81 mg Oral Daily   atorvastatin 20 mg Oral Daily   calcium-vitamin D 1 tablet Oral Daily   cefTRIAXone 1 g Intravenous  Q24H   citalopram 20 mg Oral Daily   donepezil 10 mg Oral Nightly   enoxaparin 40 mg Subcutaneous Daily   sodium chloride 10 mL Intravenous Q12H     Continuous Infusions:    custom IV KCl infusion builder 75 mL/hr Last Rate: 75 mL/hr (05/05/20 0100)     PRN Meds:  •  acetaminophen **OR** acetaminophen **OR** acetaminophen  •  aluminum-magnesium hydroxide-simethicone  •  Calcium Gluconate-NaCl **AND** calcium gluconate **AND** Calcium, Ionized  •  docusate sodium  •  hydrALAZINE  •  magnesium sulfate **OR** magnesium sulfate **OR** magnesium sulfate  •  melatonin  •  nitroglycerin  •  ondansetron **OR** ondansetron  •  potassium & sodium phosphates **OR** potassium & sodium phosphates  •  potassium chloride  •  potassium chloride  •  [COMPLETED] Insert peripheral IV **AND** sodium chloride  •  sodium chloride    Lab Results (last 24 hours)     Procedure Component Value Units Date/Time    Phosphorus [[id number]]  (Abnormal) Collected:  05/05/20 0351    Specimen:  Blood Updated:  05/05/20 0448     Phosphorus 2.3 mg/dL     Basic Metabolic Panel [[id number]]  (Abnormal) Collected:  05/05/20 0351    Specimen:  Blood Updated:  05/05/20 0447     Glucose 97 mg/dL      BUN 20 mg/dL      Creatinine 0.69 mg/dL      Sodium 141 mmol/L      Potassium 3.6 mmol/L      Chloride 109 mmol/L      CO2 21.0 mmol/L      Calcium 9.0 mg/dL      eGFR Non African Amer 82 mL/min/1.73      BUN/Creatinine Ratio 29.0     Anion Gap 11.0 mmol/L     Narrative:       GFR Normal >60  Chronic Kidney Disease <60  Kidney Failure <15      Magnesium [[id number]]  (Normal) Collected:  05/05/20 0351    Specimen:  Blood Updated:  05/05/20 0447     Magnesium 2.1 mg/dL     CBC & Differential [[id number]] Collected:  05/05/20 0351    Specimen:  Blood Updated:  05/05/20 0434    Narrative:       The following orders were created for panel order CBC & Differential.  Procedure                               Abnormality         Status                     ---------                                -----------         ------                     CBC Auto Differential[[id number]]        Abnormal            Final result                 Please view results for these tests on the individual orders.    CBC Auto Differential [[id number]]  (Abnormal) Collected:  05/05/20 0351    Specimen:  Blood Updated:  05/05/20 0434     WBC 10.90 10*3/mm3      RBC 4.00 10*6/mm3      Hemoglobin 12.5 g/dL      Hematocrit 37.1 %      MCV 92.6 fL      MCH 31.3 pg      MCHC 33.8 g/dL      RDW 14.3 %      RDW-SD 47.3 fl      MPV 8.6 fL      Platelets 221 10*3/mm3      Neutrophil % 63.9 %      Lymphocyte % 25.6 %      Monocyte % 6.7 %      Eosinophil % 3.6 %      Basophil % 0.2 %      Neutrophils, Absolute 7.00 10*3/mm3      Lymphocytes, Absolute 2.80 10*3/mm3      Monocytes, Absolute 0.70 10*3/mm3      Eosinophils, Absolute 0.40 10*3/mm3      Basophils, Absolute 0.00 10*3/mm3      nRBC 0.1 /100 WBC     Urine Culture - Urine, Urine, Catheter [[id number]]  (Normal) Collected:  05/03/20 1838    Specimen:  Urine, Catheter Updated:  05/05/20 0313     Urine Culture No growth              Palliative Assessment     Vital Signs (last 24 hours)       05/04 0700  -  05/05 0659 05/05 0700  -  05/05 1200   Most Recent    Temp (°F) 96.7 -  97.7    97.6 -  98.1     97.6 (36.4)    Heart Rate 64 -  69    56 -  74     56    Resp 17 -  18    15 -  18     15    /83 -  166/96    141/81 -  163/72     141/81    SpO2 (%) 98 -  100    98 -  100     98        Physical Exam   Constitutional:   Frail elderly female in no acute distress   HENT:   Head: Normocephalic.   Eyes: Pupils are equal, round, and reactive to light.   Cardiovascular: Normal rate and regular rhythm.   Pulmonary/Chest: Effort normal and breath sounds normal.   Frail elderly   Abdominal: Soft. Bowel sounds are normal.   Musculoskeletal: Normal range of motion.   Neurological:   Non verbal but alert and looking around the room  Does not follow commands but co-operative to  changing sheets. Incontinent of stool and urine   Skin: Skin is warm and dry. Capillary refill takes less than 2 seconds.   Vitals reviewed.        Decisional Capacity: no  Patient's understanding of illness: no  Patient goals of care:  Ongoing discussion with spouse        Iqra Garcia, SALAS

## 2020-05-05 NOTE — THERAPY TREATMENT NOTE
Acute Care - Occupational Therapy Treatment Note   Denis     Patient Name: Taylor Price  : 1940  MRN: [id number]  Today's Date: 2020             Admit Date: 5/3/2020       ICD-10-CM ICD-9-CM   1. Weakness R53.1 780.79   2. Urinary tract infection without hematuria, site unspecified N39.0 599.0   3. Hypokalemia E87.6 276.8   4. Altered mental status, unspecified altered mental status type R41.82 780.97     Patient Active Problem List   Diagnosis   • Nondisplaced zone i fracture of sacrum, initial encounter for closed fracture (CMS/[facility])   • Hyperlipidemia   • Hypertension   • Dementia of the Alzheimer's type with late onset without behavioral disturbance (CMS/[facility])   • Osteoarthritis   • Depression   • Age-related physical debility   • Delirium   • GERD (gastroesophageal reflux disease)   • Alzheimer disease (CMS/[facility])   • Weakness   • Hypernatremia   • CORNELIO (acute kidney injury) (CMS/[facility])   • Malnutrition (CMS/[facility])   • Hypokalemia     Past Medical History:   Diagnosis Date   • Alzheimer disease (CMS/[facility])    • Arthritis    • Depression    • GERD (gastroesophageal reflux disease)    • Hypertension      Past Surgical History:   Procedure Laterality Date   • HYSTERECTOMY         Therapy Treatment    Rehabilitation Treatment Summary     Row Name 20 1435 20 1400          Treatment Time/Intention    Discipline  --  speech language pathologist  -MF     Document Type  --  -SR  therapy note (daily note)  -     Subjective Information  --  no complaints  -     Mode of Treatment  occupational therapy  -SR  individual therapy;speech-language pathology  -MF     Patient/Family Observations  --  Pt sleeping in bed upon SLP arrival to room but easily aroused for meal. Pt responded to yes/no questions but required consistent cues to participate in meal. Pt required max assistance with feeding.   -     Therapy Frequency (OT Eval)  daily  -SR  --     Therapy Frequency (Swallow)  --  PRN  -MF     Patient  "Effort  --  good  -     Existing Precautions/Restrictions  fall  -SR  --     Treatment Considerations/Comments  --  PPE worn: surgical mask, face shield, gloves  -     Patient Response to Treatment  --  Pt seen for dysphagia therapy during lunch time to complete meal assessment and ensure diet tolerance. Pt sleeping in bed but easily aroused to verbal and tactile cues. Pt confused and required consistent prompts and direction throughout the meal. Pt repositioned in bed and HOB elevated to upright position. Pt answered \"yes\" when asked if she was hungry and if she wanted to eat. However, pt did not attempt to feed herself or  any items on her tray so SLP fed pt items from her lunch tray. Pt consumed tuna salad sandwich and tea via straw. Pt with slow but functional mastication with soft to chew items. Pt able to clear oral cavity independently and no oral residue observed. No overt s/s of aspiration with any items consumed. Vocal quality remained clear following all trials. Recommend continuing soft to chew diet and thin liquids. Pt will need to be a full feed for all meals 2/2 cognitive status. Discussed recs with RN who verbalized understanding and agreement. Pt may be on least restrictive diet at this time. Will continue to follow pt for diet tolerance and make further recommendations.   -MF     Recorded by [SR] Debby Burgos OT 05/05/20 1614 [MF] Luci Ruffin SLP 05/05/20 1454     Row Name 05/05/20 1435             Cognitive Assessment/Intervention- PT/OT    Orientation Status (Cognition)  disoriented to;person;place;situation;time Unable to state name   -SR      Recorded by [SR] Debby Burgos OT 05/05/20 1614      Row Name 05/05/20 1435             Bed Mobility Assessment/Treatment    Bed Mobility Assessment/Treatment  supine-sit;sit-supine  -SR      Supine-Sit [place] (Bed Mobility)  minimum assist (75% patient effort)  -SR      Sit-Supine [place] (Bed Mobility)  " "maximum assist (25% patient effort)  -SR      Recorded by [SR] Debby Burgos, OT 05/05/20 1614      Row Name 05/05/20 1435             Transfer Assessment/Treatment    Transfer Assessment/Treatment  --  -SR      Recorded by [SR] Debby Burgos, OT 05/05/20 1614      Row Name 05/05/20 1435             Bed-Chair Transfer    Bed-Chair [place] (Transfers)  moderate assist (50% patient effort) BSC   -SR      Recorded by [SR] Debby Burgos, OT 05/05/20 1614      Row Name 05/05/20 1435             Chair-Bed Transfer    Chair-Bed [place] (Transfers)  maximum assist (25% patient effort) BSC  -SR      Recorded by [SR] Debby Burgos, OT 05/05/20 1614      Row Name 05/05/20 1435             Lower Body Dressing Assessment/Training    Lower Body Dressing [place] Level  --  -SR      Recorded by [SR] Debby Burgos, OT 05/05/20 1614      Row Name 05/05/20 1435             Grooming Assessment/Training    [place] Level (Grooming)  dependent (less than 25% patient effort);hair care, combing/brushing  -SR      Recorded by [SR] Debby Burgos, OT 05/05/20 1614      Row Name 05/05/20 1435             Toileting Assessment/Training    [place] Level (Toileting)  dependent (less than 25% patient effort)  -SR      Recorded by [SR] Debby Burgos, OT 05/05/20 1614      Row Name 05/05/20 1435             Pain Assessment    Additional Documentation  Pain Scale: Word Pre/Post-Treatment (Group)  -SR      Recorded by [SR] Debby Burgos, OT 05/05/20 1614      Row Name 05/05/20 1435             Pain Scale: Word Pre/Post-Treatment    Pain: Word Scale, Pretreatment  2 - mild pain \"A little\"   -SR      Pain: Word Scale, Post-Treatment  2 - mild pain  -SR      Pre/Post Treatment Pain Comment  Unable to localize pain   -SR      Recorded by [SR] Debby Burgos, OT 05/05/20 1614      Row Name 05/05/20 1435             Pain Scale: FACES Pre/Post-Treatment    " Pain: FACES Scale, Pretreatment  --  -SR      Pain: FACES Scale, Post-Treatment  --  -SR      Recorded by [SR] Debby Burgos, OT 05/05/20 1614      Row Name 05/05/20 1435             Sensory Assessment/Intervention    Sensory General Assessment  no sensation deficits identified  -SR      Recorded by [SR] Debby Burgos, OT 05/05/20 1614      Row Name 05/05/20 1435             Plan of Care Review    Outcome Summary  Pt continues to demo severe confusion and has little participation in therapy.  She requires mod-max assist for trasnfers.  She is far below reported baseline and will require IP rehab at discharge.  PPE: mask, glvoes, faceshield.   -SR      Recorded by [SR] Debby Burgos OT 05/05/20 1614      Row Name 05/05/20 1435             Outcome Summary/Treatment Plan (OT)    Daily Summary of Progress (OT)  unable to show any progress toward functional goals  -SR      Anticipated Discharge Disposition (OT)  inpatient rehabilitation facility  -SR      Recorded by [SR] Debby Burgos, OT 05/05/20 1614      Row Name 05/05/20 1400             Outcome Summary/Treatment Plan (SLP)    Daily Summary of Progress (SLP)  progress toward functional goals as expected  -MF      Barriers to Overall Progress (SLP)  Cognitive status  -      Plan for Continued Treatment (SLP)  Continue soft to chew and thins. Pt will need to be a full feed. Will continue to follow for tolerance and to make further recs as appropriate.  -MF      Recorded by [MF] Luci Ruffin, PHILL 05/05/20 0895        User Key  (r) = Recorded By, (t) = Taken By, (c) = Cosigned By    Initials Name Effective Dates Discipline    SR Debby Burgos, OT 03/01/19 -  OT     Luci Ruffin, PHILL 06/17/19 -  SLP           Rehab Goal Summary     Row Name 05/05/20 1400             Swallow Goals (SLP)    Oral Nutrition/Hydration Goal Selection (SLP)  oral nutrition/hydration, SLP goal 1;oral nutrition/hydration, SLP goal 2  -          Oral Nutrition/Hydration Goal 1 (SLP)    Oral Nutrition/Hydration Goal 1, SLP  Patient will be seen at a meal within 24-48 hours to assess tolerance of current diet with further recommendations to be given as indicated.  -MF      Time Frame (Oral Nutrition/Hydration Goal 1, SLP)  2 days  -MF      Barriers (Oral Nutrition/Hydration Goal 1, SLP)  Saw at lunch this date. See above note.  -MF      Progress/Outcomes (Oral Nutrition/Hydration Goal 1, SLP)  goal ongoing  -MF         Oral Nutrition/Hydration Goal 2 (SLP)    Oral Nutrition/Hydration Goal 2, SLP  Patient will tolearting safest and least restrictive diet with no complications from aspiration.   -MF      Time Frame (Oral Nutrition/Hydration Goal 2, SLP)  by discharge  -MF      Barriers (Oral Nutrition/Hydration Goal 2, SLP)  Tolerating soft to chew and thins. Will continue to follow for diet tolerance.  -      Progress/Outcomes (Oral Nutrition/Hydration Goal 2, SLP)  goal ongoing  -        User Key  (r) = Recorded By, (t) = Taken By, (c) = Cosigned By    Initials Name Provider Type Discipline    Luci Siegel SLP Speech and Language Pathologist SLP            OT Recommendation and Plan  Outcome Summary/Treatment Plan (OT)  Daily Summary of Progress (OT): unable to show any progress toward functional goals  Anticipated Discharge Disposition (OT): inpatient rehabilitation facility  Planned Therapy Interventions (OT Eval): activity tolerance training, BADL retraining, functional balance retraining, transfer/mobility retraining, strengthening exercise, occupation/activity based interventions  Therapy Frequency (OT Eval): daily  Daily Summary of Progress (OT): unable to show any progress toward functional goals  Outcome Summary: Pt continues to demo severe confusion and has little participation in therapy.  She requires mod-max assist for trasnfers.  She is far below reported baseline and will require IP rehab at discharge.  PPE: mask, glvoes,  dominique.        Time Calculation:   Time Calculation- OT     Row Name 05/05/20 1615             Time Calculation- OT    OT Start Time  1435  -SR      OT Stop Time  1452  -SR      OT Time Calculation (min)  17 min  -SR      Total Timed Code Minutes- OT  17 minute(s)  -SR      OT Received On  05/05/20  -SR      OT - Next Appointment  05/06/20  -SR        User Key  (r) = Recorded By, (t) = Taken By, (c) = Cosigned By    Initials Name Provider Type    SR Debby Burgos OT Occupational Therapist        Therapy Charges for Today     Code Description Service Date Service Provider Modifiers Qty    57264421905 HC OT EVAL MOD COMPLEXITY 3 5/4/2020 Debby Burgos, OT GO 1    24495542312 HC OT SELF CARE/MGMT/TRAIN EA 15 MIN 5/5/2020 Debby Burgos OT GO 1    84266434979 HC OT THERAPEUTIC ACT EA 15 MIN 5/5/2020 Debby Burgos OT GO 1               Debby Burgos OT  5/5/2020

## 2020-05-05 NOTE — PROGRESS NOTES
Malnutrition Severity Assessment    Patient Name:  Taylor Price  YOB: 1940  MRN: [id number]  Admit Date:  5/3/2020    Patient meets criteria for : Severe Malnutrition    Comments:    Soft to Chew diet per SLP evaluation with Boost Plus BID to provide 720kcal and 28g protein if consumed.     Severe chronic disease malnutrition related to inadequate calorie intake with dementia as evidenced by 38% weight loss in less than 6 months with family report of poor po intake for greater than 1 month PTA.      Malnutrition Severity Assessment  Malnutrition Type: Chronic Disease - Related Malnutrition     Malnutrition Type (last 8 hours)      Malnutrition Severity Assessment     Row Name 05/05/20 1622       Malnutrition Severity Assessment    Malnutrition Type  Chronic Disease - Related Malnutrition    Row Name 05/05/20 1622       Insufficient Energy Intake     Insufficient Energy Intake   <75% of est. energy requirement for > or equal to 1 month    Row Name 05/05/20 1622       Unintentional Weight Loss     Unintentional Weight Loss   Weight loss greater than 10% in six months    Row Name 05/05/20 1622       Muscle Loss    Loss of Muscle Mass Findings  -- Unable to asses d/t remote work    Row Name 05/05/20 1622       Fat Loss    Subcutaneous Fat Loss Findings  -- unable to assess d/t remote work    Row Name 05/05/20 1622       Criteria Met (Must meet criteria for severity in at least 2 of these categories: M Wasting, Fat Loss, Fluid, Secondary Signs, Wt. Status, Intake)    Patient meets criteria for   Severe Malnutrition          Electronically signed by:  Tiesha Curry RD  05/05/20 16:24

## 2020-05-05 NOTE — SIGNIFICANT NOTE
Full Code / Aggressive Care    Palliative care  met with pt to assess for goals of care.  Pt confused.  Would visually acknowledge my presence, but only mumbled unintelligible words.  Pt's  was called who reported that he wanted the pt to be able to come home, and participate in rehab there.  Pt's  reports that he feels he can care for the pt's needs.   reports that the pt fell while they are on vacation in [place] in December, and spent several days in the hospital.  Pt participated in physical therapy at home once they were discharged from the hospital at that time.   reports that the pt has slowly regained some of her strength, but that her dementia has worsened since then.  Pt reports that if the pt's ability to eat and swallow were to significantly worsen then he could understand her going to a rehab to get better therapy, but he feels like he can handle doing PT since they did it before after her fall.   reports that they have two children and that they bring them food throughout the week and assist when needed.  Emotional support provided.  Discharge preferences relayed to care coordination team.  Emotional support provided.  Will continue to follow.  Thank you for the consult.         05/05/20 1500   PC Encounter Information   Palliative Care Patient? yes   Referral Date 05/04/20   Referral Time 1516   Date of Initial Encounter with a Palliative Care Provider 05/05/20   Time of initial encounter with a Palliative Care Provider 1045   Time Required for Initial Referral 30 mins   Additional Visit/Time Required to Achieve Results 30 mins   Patient Unit at Time of Referral 2B   Patient Unit Specialty at Time of Referral medical surgical   Primary Diagnosis Leading to PC Consult dementia   Code Status at Time of Consult full   Palliative Care Team Members Involved in Consultation nurse practitioner;   Symptom Distress Data   Non-Pain Symptoms yes/pos    Non-Pain Symptoms Intervened yes   Pain Assessment Data   Pain Screening yes/pos   Pain Intervened yes   Screening Status/Interventions Data   Psychosocial Needs pos   Psychosocial Needs Intervened yes   Spiritual Needs unable   Goals of Care/ACP pos   Goals of Care/ACP Intervened yes   Health Care Directives/Treatment Preferences Data   Advance Directive Document on Chart at Time of Consult no   Pre-existing AND/MOST/POLST Order Data No   POLST/MOST Initiated no   Advance Directive Status Patient does not have advance directive   Goals of Care/Treatment Preferences (initial assessment and clinical changes) Completed   Treatment Preferences full scope of treatment   Code Status Discussed yes   Outcomes   Code Status After Consult full   Number of Family Meetings 2

## 2020-05-06 VITALS
DIASTOLIC BLOOD PRESSURE: 81 MMHG | SYSTOLIC BLOOD PRESSURE: 164 MMHG | HEIGHT: 60 IN | WEIGHT: 107.7 LBS | OXYGEN SATURATION: 99 % | RESPIRATION RATE: 18 BRPM | HEART RATE: 73 BPM | TEMPERATURE: 98.3 F | BODY MASS INDEX: 21.14 KG/M2

## 2020-05-06 PROBLEM — N39.0 UTI (URINARY TRACT INFECTION), BACTERIAL: Status: ACTIVE | Noted: 2020-05-06

## 2020-05-06 PROBLEM — A49.9 UTI (URINARY TRACT INFECTION), BACTERIAL: Status: ACTIVE | Noted: 2020-05-06

## 2020-05-06 PROBLEM — N17.9 AKI (ACUTE KIDNEY INJURY) (HCC): Status: RESOLVED | Noted: 2020-05-03 | Resolved: 2020-05-06

## 2020-05-06 LAB
ANION GAP SERPL CALCULATED.3IONS-SCNC: 10 MMOL/L (ref 5–15)
BASOPHILS # BLD AUTO: 0 10*3/MM3 (ref 0–0.2)
BASOPHILS NFR BLD AUTO: 0.4 % (ref 0–1.5)
BUN BLD-MCNC: 9 MG/DL (ref 8–23)
BUN/CREAT SERPL: 13.4 (ref 7–25)
CALCIUM SPEC-SCNC: 9.2 MG/DL (ref 8.6–10.5)
CHLORIDE SERPL-SCNC: 110 MMOL/L (ref 98–107)
CO2 SERPL-SCNC: 24 MMOL/L (ref 22–29)
CREAT BLD-MCNC: 0.67 MG/DL (ref 0.57–1)
DEPRECATED RDW RBC AUTO: 47.7 FL (ref 37–54)
EOSINOPHIL # BLD AUTO: 0.3 10*3/MM3 (ref 0–0.4)
EOSINOPHIL NFR BLD AUTO: 3.5 % (ref 0.3–6.2)
ERYTHROCYTE [DISTWIDTH] IN BLOOD BY AUTOMATED COUNT: 14.4 % (ref 12.3–15.4)
GFR SERPL CREATININE-BSD FRML MDRD: 85 ML/MIN/1.73
GLUCOSE BLD-MCNC: 101 MG/DL (ref 65–99)
HCT VFR BLD AUTO: 42.2 % (ref 34–46.6)
HGB BLD-MCNC: 14.3 G/DL (ref 12–15.9)
LYMPHOCYTES # BLD AUTO: 2.7 10*3/MM3 (ref 0.7–3.1)
LYMPHOCYTES NFR BLD AUTO: 27.1 % (ref 19.6–45.3)
MAGNESIUM SERPL-MCNC: 2.1 MG/DL (ref 1.6–2.4)
MCH RBC QN AUTO: 31.8 PG (ref 26.6–33)
MCHC RBC AUTO-ENTMCNC: 34 G/DL (ref 31.5–35.7)
MCV RBC AUTO: 93.7 FL (ref 79–97)
MONOCYTES # BLD AUTO: 0.5 10*3/MM3 (ref 0.1–0.9)
MONOCYTES NFR BLD AUTO: 5.5 % (ref 5–12)
NEUTROPHILS # BLD AUTO: 6.3 10*3/MM3 (ref 1.7–7)
NEUTROPHILS NFR BLD AUTO: 63.5 % (ref 42.7–76)
NRBC BLD AUTO-RTO: 0.1 /100 WBC (ref 0–0.2)
PHOSPHATE SERPL-MCNC: 2.8 MG/DL (ref 2.5–4.5)
PLATELET # BLD AUTO: 237 10*3/MM3 (ref 140–450)
PMV BLD AUTO: 9.4 FL (ref 6–12)
POTASSIUM BLD-SCNC: 3.4 MMOL/L (ref 3.5–5.2)
RBC # BLD AUTO: 4.5 10*6/MM3 (ref 3.77–5.28)
SODIUM BLD-SCNC: 144 MMOL/L (ref 136–145)
WBC NRBC COR # BLD: 9.9 10*3/MM3 (ref 3.4–10.8)

## 2020-05-06 PROCEDURE — 85025 COMPLETE CBC W/AUTO DIFF WBC: CPT | Performed by: PHYSICIAN ASSISTANT

## 2020-05-06 PROCEDURE — 80048 BASIC METABOLIC PNL TOTAL CA: CPT | Performed by: PHYSICIAN ASSISTANT

## 2020-05-06 PROCEDURE — 25010000002 POTASSIUM CHLORIDE PER 2 MEQ OF POTASSIUM: Performed by: INTERNAL MEDICINE

## 2020-05-06 PROCEDURE — 92526 ORAL FUNCTION THERAPY: CPT

## 2020-05-06 PROCEDURE — 83735 ASSAY OF MAGNESIUM: CPT | Performed by: PHYSICIAN ASSISTANT

## 2020-05-06 PROCEDURE — 84100 ASSAY OF PHOSPHORUS: CPT | Performed by: PHYSICIAN ASSISTANT

## 2020-05-06 PROCEDURE — 99239 HOSP IP/OBS DSCHRG MGMT >30: CPT | Performed by: INTERNAL MEDICINE

## 2020-05-06 RX ORDER — AMLODIPINE BESYLATE 5 MG/1
10 TABLET ORAL DAILY
Status: DISCONTINUED | OUTPATIENT
Start: 2020-05-07 | End: 2020-05-06 | Stop reason: HOSPADM

## 2020-05-06 RX ORDER — AMLODIPINE BESYLATE 10 MG/1
10 TABLET ORAL DAILY
Qty: 30 TABLET | Refills: 0 | Status: SHIPPED | OUTPATIENT
Start: 2020-05-07 | End: 2020-06-06

## 2020-05-06 RX ADMIN — ACETAMINOPHEN 650 MG: 325 TABLET, FILM COATED ORAL at 12:51

## 2020-05-06 RX ADMIN — ATORVASTATIN CALCIUM 20 MG: 20 TABLET, FILM COATED ORAL at 08:50

## 2020-05-06 RX ADMIN — OYSTER SHELL CALCIUM WITH VITAMIN D 1 TABLET: 500; 200 TABLET, FILM COATED ORAL at 08:50

## 2020-05-06 RX ADMIN — CITALOPRAM HYDROBROMIDE 20 MG: 20 TABLET ORAL at 08:50

## 2020-05-06 RX ADMIN — AMLODIPINE BESYLATE 5 MG: 5 TABLET ORAL at 08:51

## 2020-05-06 RX ADMIN — POTASSIUM CHLORIDE 75 ML/HR: 149 INJECTION, SOLUTION, CONCENTRATE INTRAVENOUS at 06:06

## 2020-05-06 RX ADMIN — ASPIRIN 81 MG 81 MG: 81 TABLET ORAL at 08:50

## 2020-05-06 RX ADMIN — Medication 10 ML: at 08:50

## 2020-05-06 NOTE — THERAPY DISCHARGE NOTE
Acute Care - Speech Language Pathology   Swallow Treatment Note/Discharge    Denis     Patient Name: Taylor Price  : 1940  MRN: [id number]  Today's Date: 2020               Admit Date: 5/3/2020    Visit Dx:      ICD-10-CM ICD-9-CM   1. Weakness R53.1 780.79   2. Urinary tract infection without hematuria, site unspecified N39.0 599.0   3. Hypokalemia E87.6 276.8   4. Altered mental status, unspecified altered mental status type R41.82 780.97   5. Hypernatremia E87.0 276.0   6. CORNELIO (acute kidney injury) (CMS/[facility]) N17.9 584.9   7. Malnutrition, unspecified type (CMS/[facility]) E46 263.9     Patient Active Problem List   Diagnosis   • Nondisplaced zone i fracture of sacrum, initial encounter for closed fracture (CMS/[facility])   • Hyperlipidemia   • Hypertension   • Dementia of the Alzheimer's type with late onset without behavioral disturbance (CMS/[facility])   • Osteoarthritis   • Depression   • Age-related physical debility   • Delirium   • GERD (gastroesophageal reflux disease)   • Alzheimer disease (CMS/[facility])   • Weakness   • Hypernatremia   • Malnutrition (CMS/[facility])   • Hypokalemia   • UTI (urinary tract infection), bacterial       Therapy Treatment  Rehabilitation Treatment Summary     Row Name 20 1500          Discipline  speech language pathologist  -    Document Type  therapy note (daily note);discharge treatment  -    Subjective Information  no complaints  -    Mode of Treatment  individual therapy;speech-language pathology  -    Patient/Family Observations  Pt laying in bed and agreeable to dysphagia therapy.   -    Therapy Frequency (Swallow)  PRN  -    Patient Effort  good  -    Treatment Considerations/Comments  PPE worn: surgical mask, face shield, gloves  -    Patient Response to Treatment  Pt participated in dysphagia therapy to assess diet tolerance this date. Pt currently on soft to chew diet with thin liquids. Pt seen for PM snack of ice water and diced peaches. Pt laying in bed  upon SLP arrival and agreeable to PO trials. SLP administered all trials during snack as pt did not attempt to feed herself. Pt continues with slow but functional mastication with soft solids. Pt able to clear oral cavity independently and no oral residue appreciated. No overt s/s of aspiration with any consistency trialed. Vocal quality remained clear across all trials. Pt has remained on soft to chew diet throughout her admission and this appears to be her least restrictive diet. Recommend continuing soft to chew diet with thin liquids. Pt will need to be a full feed 2/2 cognitive deficits. No further skilled ST intervention indicated at this time as pt is tolerating her safest and least restrictive diet. SLP to sign off on pt - please re-consult if any concerns arise.   -    Recorded by [] Luci Ruffin SLP 05/06/20 1549    Row Name 05/06/20 1500          Daily Summary of Progress (SLP)  progress toward functional goals as expected  -    Plan for Continued Treatment (SLP)  Continue soft to chew and thins. Pt will need to be a full feed. SLP to sign off at this time.  -    Anticipated Dischage Disposition  home with 24/7 care  -    Reason for Discharge  all goals and outcomes met, no further needs identified  -    Recorded by [] Luci Ruffin SLP 05/06/20 1546      User Key  (r) = Recorded By, (t) = Taken By, (c) = Cosigned By    Initials Name Effective Dates Discipline     Luci Ruffin SLP 06/17/19 -  SLP        Outcome Summary  Outcome Summary/Treatment Plan (SLP)  Daily Summary of Progress (SLP): progress toward functional goals as expected (05/06/20 1500 : Luci Ruffin SLP)  Plan for Continued Treatment (SLP): Continue soft to chew and thins. Pt will need to be a full feed. SLP to sign off at this time. (05/06/20 1500 : Luci Ruffin SLP)  Anticipated Dischage Disposition: home with 24/7 care (05/06/20 1500 : Luci Ruffin SLP)  Reason for Discharge: all goals and outcomes  met, no further needs identified (05/06/20 1500 : Luci Ruffin, SLP)  SLP GOALS     Row Name 05/06/20 1500 05/05/20 1400 05/04/20 1200       Oral Nutrition/Hydration Goal 1 (SLP)    Oral Nutrition/Hydration Goal 1, SLP  Patient will be seen at a meal within 24-48 hours to assess tolerance of current diet with further recommendations to be given as indicated.  -MF  Patient will be seen at a meal within 24-48 hours to assess tolerance of current diet with further recommendations to be given as indicated.  -MF  Patient will be seen at a meal within 24-48 hours to assess tolerance of current diet with further recommendations to be given as indicated.  -MM    Time Frame (Oral Nutrition/Hydration Goal 1, SLP)  2 days  -MF  2 days  -MF  2 days  -MM    Barriers (Oral Nutrition/Hydration Goal 1, SLP)  Saw with a snack this date. See above note.  -MF  Saw at lunch this date. See above note.  -MF  --    Progress/Outcomes (Oral Nutrition/Hydration Goal 1, SLP)  goal met  -MF  goal ongoing  -MF  --       Oral Nutrition/Hydration Goal 2 (SLP)    Oral Nutrition/Hydration Goal 2, SLP  Patient will tolearting safest and least restrictive diet with no complications from aspiration.   -MF  Patient will tolearting safest and least restrictive diet with no complications from aspiration.   -MF  Patient will tolearting safest and least restrictive diet with no complications from aspiration.   -MM    Time Frame (Oral Nutrition/Hydration Goal 2, SLP)  by discharge  -MF  by discharge  -MF  by discharge  -MM    Barriers (Oral Nutrition/Hydration Goal 2, SLP)  Tolerating soft to chew and thins. This appears to be pt's least restrictive diet.  -MF  Tolerating soft to chew and thins. Will continue to follow for diet tolerance.  -MF  --    Progress/Outcomes (Oral Nutrition/Hydration Goal 2, SLP)  goal met  -MF  goal ongoing  -MF  --      User Key  (r) = Recorded By, (t) = Taken By, (c) = Cosigned By    Initials Name Provider Type    MM  Julita Wren, SLP Speech and Language Pathologist    Luci Siegel, PHILL Speech and Language Pathologist          EDUCATION  The patient has been educated in the following areas:   Modified Diet Instruction. Pt unable to verbalize understanding.    SLP Recommendation and Plan  Daily Summary of Progress (SLP): progress toward functional goals as expected     Plan for Continued Treatment (SLP): Continue soft to chew and thins. Pt will need to be a full feed. SLP to sign off at this time.  Anticipated Dischage Disposition: home with 24/7 care        Reason for Discharge: all goals and outcomes met, no further needs identified          Therapy Charges for Today     Code Description Service Date Service Provider Modifiers Qty    37481613605 HC ST TREATMENT SWALLOW 4 5/5/2020 Luci Ruffin SLP GN 1    81533337184 HC ST TREATMENT SWALLOW 4 5/6/2020 Luci Ruffin SLP GN 1               SLP Discharge Summary  Anticipated Dischage Disposition: home with 24/7 care  Reason for Discharge: all goals and outcomes met, no further needs identified    PHILL Haji  5/6/2020

## 2020-05-06 NOTE — THERAPY TREATMENT NOTE
Patient Name: aTylor Price  : 1940    MRN: [id number]                              Today's Date: 2020       Admit Date: 5/3/2020    Visit Dx:     ICD-10-CM ICD-9-CM   1. Weakness R53.1 780.79   2. Urinary tract infection without hematuria, site unspecified N39.0 599.0   3. Hypokalemia E87.6 276.8   4. Altered mental status, unspecified altered mental status type R41.82 780.97   5. Hypernatremia E87.0 276.0   6. CORNELIO (acute kidney injury) (CMS/[facility]) N17.9 584.9   7. Malnutrition, unspecified type (CMS/[facility]) E46 263.9     Patient Active Problem List   Diagnosis   • Nondisplaced zone i fracture of sacrum, initial encounter for closed fracture (CMS/[facility])   • Hyperlipidemia   • Hypertension   • Dementia of the Alzheimer's type with late onset without behavioral disturbance (CMS/[facility])   • Osteoarthritis   • Depression   • Age-related physical debility   • Delirium   • GERD (gastroesophageal reflux disease)   • Alzheimer disease (CMS/[facility])   • Weakness   • Hypernatremia   • Malnutrition (CMS/[facility])   • Hypokalemia   • UTI (urinary tract infection), bacterial     Past Medical History:   Diagnosis Date   • Alzheimer disease (CMS/[facility])    • Arthritis    • Depression    • GERD (gastroesophageal reflux disease)    • Hypertension      Past Surgical History:   Procedure Laterality Date   • HYSTERECTOMY       General Information    No documentation.       Mobility    No documentation.       Obj/Interventions    No documentation.       Goals/Plan    No documentation.       Clinical Impression    No documentation.       Outcome Measures     Row Name 20 1327          Modified Dolphin Scale    Modified Dolphin Scale  5 - Severe disability.  Bedridden, incontinent, and requiring constant nursing care and attention. last known modified xander  -SC     Row Name 20 1327          Functional Assessment    Outcome Measure Options  Modified Xander  -SC       User Key  (r) = Recorded By, (t) = Taken By, (c) = Cosigned By     Initials Name Provider Type    Ilene Shirley, XANDER Physical Therapy Assistant          PT Recommendation and Plan           Time Calculation:         PT G-Codes  Outcome Measure Options: Modified Xander  AM-PAC 6 Clicks Score (PT): 10  Modified Grasston Scale: 5 - Severe disability.  Bedridden, incontinent, and requiring constant nursing care and attention.(last known modified xander)    Ilene Garcia PTA  5/6/2020

## 2020-05-06 NOTE — PLAN OF CARE
Problem: Patient Care Overview  Goal: Plan of Care Review  Outcome: Ongoing (interventions implemented as appropriate)  Flowsheets  Taken 5/4/2020 0255  Progress: no change  Taken 5/5/2020 1920  Plan of Care Reviewed With: patient  Note:   Patient continues to be confused, it is recommended that the patient go to inpatient rehab but the family is declining and want her to come home and have [facility]  Goal: Individualization and Mutuality  Outcome: Ongoing (interventions implemented as appropriate)  Goal: Discharge Needs Assessment  Outcome: Ongoing (interventions implemented as appropriate)  Flowsheets (Taken 5/4/2020 1455 by Torsten Alves, RN)  Equipment Currently Used at Home: walker, rolling;commode  Anticipated Changes Related to Illness: inability to care for self  Concerns Comments: PT/OT pending - may require placement.  Transportation Anticipated: family or friend will provide  Transportation Concerns: car, none  Concerns to be Addressed: discharge planning  Readmission Within the Last 30 Days: no previous admission in last 30 days  Patient/Family Anticipated Services at Transition: none  Patient/Family Anticipates Transition to: home with family  Note:   Patient to discharge home with  and [facility]  Goal: Interprofessional Rounds/Family Conf  Outcome: Ongoing (interventions implemented as appropriate)     Problem: Fall Risk (Adult)  Goal: Identify Related Risk Factors and Signs and Symptoms  Outcome: Ongoing (interventions implemented as appropriate)  Goal: Absence of Fall  Outcome: Ongoing (interventions implemented as appropriate)  Flowsheets (Taken 5/4/2020 0255)  Absence of Fall: making progress toward outcome     Problem: Pain, Acute (Adult)  Goal: Identify Related Risk Factors and Signs and Symptoms  Outcome: Ongoing (interventions implemented as appropriate)  Goal: Acceptable Pain Control/Comfort Level  Outcome: Ongoing (interventions implemented as appropriate)  Flowsheets  (Taken 5/4/2020 0255)  Acceptable Pain Control/Comfort Level: making progress toward outcome     Problem: Skin Injury Risk (Adult)  Goal: Identify Related Risk Factors and Signs and Symptoms  Outcome: Ongoing (interventions implemented as appropriate)  Flowsheets (Taken 5/4/2020 0255)  Related Risk Factors (Skin Injury Risk): cognitive impairment;advanced age;fluid intake inadequate  Goal: Skin Health and Integrity  Outcome: Ongoing (interventions implemented as appropriate)  Flowsheets (Taken 5/4/2020 0255)  Skin Health and Integrity: making progress toward outcome

## 2020-05-06 NOTE — PLAN OF CARE
Problem: Patient Care Overview  Goal: Plan of Care Review  5/6/2020 1650 by Rebeca Lockhart RN  Outcome: Ongoing (interventions implemented as appropriate)  5/6/2020 1650 by Rebeca Lockhart RN  Outcome: Ongoing (interventions implemented as appropriate)

## 2020-05-06 NOTE — PLAN OF CARE
Problem: Patient Care Overview  Goal: Plan of Care Review  Outcome: Ongoing (interventions implemented as appropriate)  Flowsheets (Taken 5/6/2020 1550)  Progress: no change  Plan of Care Reviewed With: patient  Outcome Summary: Pt participated in dysphagia therapy to assess diet tolerance this date. Pt currently on soft to chew diet with thin liquids. Pt laying in bed upon SLP arrival and agreeable to PO trials. SLP administered all trials of [facility] soft and thins during snack as pt did not attempt to feed herself. Pt continues with slow but fxl mastication with soft solids. Pt able to clear oral cavity independently and no oral residue appreciated. No overt s/s of aspiration with any consistency trialed. Pt has remained on soft to chew diet throughout her admission and this appears to be her least restrictive diet. Recommend continuing soft to chew diet with thin liquids. Pt will need to be a full feed 2/2 cognitive deficits. No further skilled ST intervention indicated at this time as pt is tolerating her safest and least restrictive diet. SLP to sign off on pt - please re-consult if any concerns arise.

## 2020-05-06 NOTE — DISCHARGE SUMMARY
[facility]  DISCHARGE SUMMARY        Prepared For PCP:  Camelia Miller APRN    Patient Name: Taylor Price  : 1940  MRN: [id number]      Date of Admission:   5/3/2020    Date of Discharge:  2020    Length of stay:  LOS: 3 days     Hospital Course     Presenting Problem:   Hypokalemia [E87.6]  Weakness [R53.1]  Urinary tract infection without hematuria, site unspecified [N39.0]  Altered mental status, unspecified altered mental status type [R41.82]      Active Hospital Problems    Diagnosis  POA   • **Weakness [R53.1]  Yes   • UTI (urinary tract infection), bacterial [N39.0, A49.9]  Unknown   • Hypernatremia [E87.0]  Yes   • Malnutrition (CMS/[facility]) [E46]  Yes   • Hypokalemia [E87.6]  Yes   • Alzheimer disease (CMS/[facility]) [G30.9, F02.80]  Yes   • GERD (gastroesophageal reflux disease) [K21.9]  Yes   • Depression [F32.9]  Yes   • Hyperlipidemia [E78.5]  Yes   • Osteoarthritis [M19.90]  Yes   • Hypertension [I10]  Yes      Resolved Hospital Problems    Diagnosis Date Resolved POA   • CORNELIO (acute kidney injury) (CMS/[facility]) [N17.9] 2020 Yes       1.  Debility/ Increasing weakness with failure to thrive.  Likely related to worsening/advanced Alzheimer's dementia.  -Family notes patient having increasing weakness, confusion and generalized failure to thrive over the past 3 days with significantly decreased p.o. Intake  -CT of the abdomen and pelvis reported with air in the urinary bladder, fractures of the bilateral inferior pubic rami and left superior pubic rami which appear to be subacute.  There also appears to be an insufficiency fracture involving the right sacral ala.    *A small nonobstructing left renal stone is noted as well as some sigmoid diverticulosis and  * a 6.4 cm left adnexal cyst slightly increased from prior exam.    Monitor intermittently if patient's family wants continued a full care.  *-CT of the head without contrast reported as showing no  acute intracranial process with findings suggestive of moderate chronic small vessel ischemic disease.    -EKG shows sinus arrhythmia at 66 with multiple premature supraventricular complexes noted as well as diffuse T wave inversion and a QTC of 459 ms.  -Suspected UTI: Pyuria noted on UA though some contamination appears to be present, ceftriaxone given in the ED.  Continue 3 days .  Cultures negative.  -PT/OT consult  -Passed swallow study and given soft diet.  -Continue fluids as below  -Case management consult.   refused rehab placement  Patient will be discharged with home health      UTI  Culture negative  Finish third dose of Rocephin may be discharged after that.    Palliative care consulted and patient wants full care.     2.  CORNELIO (likely prerenal)  Resolved     3.  Hypernatremia  Resolved     4.  Malnutrition (current BMI: 21.3)  -Patient appears to have lost approximately 65 pounds since 12/3/2019  -Swallow study ordered  -Nutrition consult     5.  Hypokalemia  -Potassium: 3.0  -Magnesium: 2.5  -Replacement protocol ordered     6.  Hypertension  -increase norvac     7.  Alzheimer's dementia  -Patient's mental condition noted is worsened from baseline per ED provider who spoke with family  -Continue Aricept  -I would recommend supportive and palliative care only but apparently her  wants full care.        8.  GERD  -PPI held pending resolution of CORNELIO noted above        9.  Depression  -Continue Celexa     10.  Hyperlipidemia  -Continue statin     12.  Osteoarthritis  -CT reports multiple subacute fractures of the bilateral inferior pubic rami and left superior pubic rami         Hospital Course:  Taylor Price is a 80 y.o. female       Initial H&P     Ms. Price is a 80 y.o. female with past medical history of Alzheimer's, depression, GERD, hyperlipidemia, hypertension and osteoarthritis who presents to [facility] via ambulance after patient's family noted she was having  increasing weakness and failure to thrive.  The time of my exam patient is alert and she will say okay thank you any time she has explained information but speakes incomprehensible words when asked most questions.  ED provider spoke with patient's family who state for the past several days she has been unwilling to move from her recliner and when attempted to give any p.o. intake she either pushes it away or spits it out.  No recent trauma, fever or other obvious illnesses reported by family.  Patient is unable to provide any additional ROS information     Labs in the ED were significant for troponin of less than 0.010, sodium: 151, potassium: 3.0, creatinine: 1.08, BUN: 36, BUN/creatinine ratio: 33.3, EGFR: 49, lipase: 88, magnesium: 2.5 and TSH: 1.350.  CBC reviewed and is generally unremarkable.  UA noted with 6-12 WBCs, no bacteria, 0-2 RBCs, 1+ protein, 2+ leukocyte esterase, 1+ ketones however specimen appears to be contaminated with 3-sick squamous epithelial cells noted.  Urine cultures obtained and is pending.  Chest x-ray reviewed and shows no acute cardiopulmonary process.  CT of the abdomen and pelvis reported with air in the urinary bladder, fractures of the bilateral inferior pubic rami and left superior pubic rami which appear to be subacute.  There also appears to be an insufficiency fracture involving the right sacral ala.  A small nonobstructing left renal stone is noted as well as some sigmoid diverticulosis and a 6.4 cm left adnexal cyst slightly increased from prior exam.  CT of the head without contrast reported as showing no acute intracranial process with findings suggestive of moderate chronic small vessel ischemic disease.  EKG shows sinus arrhythmia at 66 with multiple premature supraventricular complexes noted as well as diffuse T wave inversion and a QTC of 459 ms.      After admission patient had been treated with IV fluids with D5 with potassium chloride.  She had significant  hyponatremia and hypokalemia on admission that has improved..  The source of her mental status changes was not clear whether related to hyponatremia on its own or underlying UTI which was evaluated and treated with Rocephin.  She received 3 days of Rocephin.  Her urine culture were negative so.  CT head also was done admission and was negative.  Events patient started to talk slowly and I suspect she is close to her baseline based on my discussion with her  on the phone.  The patient was quite weak and needed for assistance with activities and feeding.  Eventually she passed swallowing evaluation and receiving diet at this time.  She seems to be relatively stable to go back home as her  refused to send her to rehab.  Abnormal findings including lipase and LFT.  I suspect this probably related to her acute dehydration and decompensation.  Other electrolytes also were addressed and replaced.  I called palliative care to evaluate the patient and felt the patient would be candidate for supportive care only to avoid repeat hospitalization given her advanced dementia and suspect that she would need to be rehospitalized frequently given her dementia would probably and interfere with her swallowing as well as increase her risk of infections.  But her  did want full treatment with aggressive therapy.  She is still full code.        Recommendation for Outpatient Providers:     Follow-ups CMP and CBC and lipase as an outpatient.          Reasons For Change In Medications and Indications for New Medications:    Norvasc increased to control blood pressure.    Day of Discharge     HPI:       Vital Signs:   Temp:  [97.6 °F (36.4 °C)-98.3 °F (36.8 °C)] 98.3 °F (36.8 °C)  Heart Rate:  [56-73] 73  Resp:  [15-18] 18  BP: (141-164)/(74-81) 164/81     Physical Exam:  Physical Exam  Patient appears more perky and alert today.  She is saying hi whenever I am examining her.  She is not able to answer questions by  herself or her name.  She is moving upper and lower extremities fairly equally.  She does not have any facial droop.  No evidence of skin rash.  She had soft abdomen.  Cannot tell if there is any tenderness perceived.    Pertinent  and/or Most Recent Results     Results from last 7 days   Lab Units 05/06/20  0342 05/05/20  0351 05/04/20  0340 05/03/20 2225 05/03/20  1745   WBC 10*3/mm3 9.90 10.90* 10.70  --  10.20   HEMOGLOBIN g/dL 14.3 12.5 12.9  --  13.9   HEMATOCRIT % 42.2 37.1 38.2  --  40.9   PLATELETS 10*3/mm3 237 221 242  --  283   SODIUM mmol/L 144 141 153*  --  151*   POTASSIUM mmol/L 3.4* 3.6 2.6* 2.7* 3.0*   CHLORIDE mmol/L 110* 109* 115*  --  113*   CO2 mmol/L 24.0 21.0* 25.0  --  23.0   BUN mg/dL 9 20 35*  --  36*   CREATININE mg/dL 0.67 0.69 0.82  --  1.08*   GLUCOSE mg/dL 101* 97 124*  --  118*   CALCIUM mg/dL 9.2 9.0 9.2  --  10.0     Results from last 7 days   Lab Units 05/03/20  1745   BILIRUBIN mg/dL 0.5   ALK PHOS U/L 72   ALT (SGPT) U/L 48*   AST (SGOT) U/L 55*     Results from last 7 days   Lab Units 05/04/20  0340   CHOLESTEROL mg/dL 283*   TRIGLYCERIDES mg/dL 132   HDL CHOL mg/dL 57     Results from last 7 days   Lab Units 05/04/20  0934 05/04/20  0340 05/03/20 2225 05/03/20  1745   TSH uIU/mL  --   --   --  1.350   HEMOGLOBIN A1C %  --  5.0  --   --    PROBNP pg/mL  --   --   --  453.9   TROPONIN T ng/mL <0.010 <0.010 <0.010 <0.010       Brief Urine Lab Results  (Last result in the past 365 days)      Color   Clarity   Blood   Leuk Est   Nitrite   Protein   CREAT   Urine HCG        05/03/20 1838 Dark Yellow  Comment:  Result checked Visual Confirmation Clear Negative Moderate (2+) Negative 30 mg/dL (1+)               Microbiology Results Abnormal     Procedure Component Value - Date/Time    Urine Culture - Urine, Urine, Catheter [[id number]]  (Normal) Collected:  05/03/20 1838    Lab Status:  Final result Specimen:  Urine, Catheter Updated:  05/05/20 0313     Urine Culture No growth                Ct Abdomen Pelvis Without Contrast    Result Date: 5/3/2020  Impression: 1.Air within urinary bladder. Correlate for recent instrumentation or cystitis. 2.Fractures of bilateral inferior pubic rami and left superior pubic rami that appear subacute. There also appears to be an insufficiency fracture involving the right sacral ala. Correlate with focal tenderness and history of recent trauma. 3.Small nonobstructing left renal stone. 4.Sigmoid diverticulosis. 5.6.4 cm left adnexal cyst, slightly increased from prior exam. Recommend follow-up pelvic ultrasound in one year.    Electronically Signed By-DR. Hira Rocha MD On:5/3/2020 8:13 PM This report was finalized on [id number] by DR. Hira Rocha MD.    Ct Head Without Contrast    Result Date: 5/3/2020  Impression: 1.No acute intracranial process identified. 2.Findings suggestive of moderate chronic small vessel ischemic disease.  Electronically Signed By-DR. Hira Rocha MD On:5/3/2020 8:07 PM This report was finalized on [id number] by DR. Hira Rocha MD.    Xr Chest 1 View    Result Date: 5/3/2020  Impression: No acute cardiopulmonary process identified.  Electronically Signed By-DR. Hira Rocha MD On:5/3/2020 6:17 PM This report was finalized on [id number] by DR. Hira Rocha MD.                             Test Results Pending at Discharge        Procedures Performed           Consults:   Consults     Date and Time Order Name Status Description    5/4/2020 1016 Inpatient Palliative Care MD Consult Completed     5/3/2020 2028 Hospitalist (on-call MD unless specified) Completed             Discharge Details        Discharge Medications      Changes to Medications      Instructions Start Date   amLODIPine 10 MG tablet  Commonly known as:  NORVASC  What changed:    · medication strength  · how much to take   10 mg, Oral, Daily   Start Date:  May 7, 2020        Continue These Medications      Instructions Start Date   aspirin  81 MG chewable tablet   81 mg, Oral, Daily      Calcium 600+D 600-800 MG-UNIT tablet  Generic drug:  calcium carb-cholecalciferol   1 tablet, Oral, Daily      citalopram 20 MG tablet  Commonly known as:  CeleXA   20 mg, Oral, Daily      donepezil 10 MG tablet  Commonly known as:  ARICEPT   10 mg, Oral, Nightly      meloxicam 15 MG tablet  Commonly known as:  MOBIC   15 mg, Oral, Daily      omeprazole 20 MG capsule  Commonly known as:  priLOSEC   20 mg, Oral, Daily      pravastatin 80 MG tablet  Commonly known as:  PRAVACHOL   80 mg, Oral, Daily         Stop These Medications    benazepril 40 MG tablet  Commonly known as:  LOTENSIN            No Known Allergies      Discharge Disposition:  Home-Health Care Svc    Diet:  Hospital:  Diet Order   Procedures   • Diet Texture; Soft to Chew; Full Feed - Nursing         Discharge Activity:         CODE STATUS:    Code Status and Medical Interventions:   Ordered at: 05/03/20 2116     Level Of Support Discussed With:    Patient     Code Status:    CPR     Medical Interventions (Level of Support Prior to Arrest):    Full         Follow-up Appointments  No future appointments.    Additional Instructions for the Follow-ups that You Need to Schedule     Ambulatory Referral to Home Health   As directed      Face to Face Visit Date:  5/5/2020    Follow-up provider for Plan of Care?:  I treated the patient in an acute care facility and will not continue treatment after discharge.    Follow-up provider:  CAMELIA MILLER [[id number]]    Reason/Clinical Findings:  Failure to thrive, CORNELIO, malnutrition    Describe mobility limitations that make leaving home difficult:  weakness    Nursing/Therapeutic Services Requested:  Skilled Nursing (HHC to eval and treat)    Skilled nursing orders:  Other (C to eval and treat)    Frequency:  1 Week 1         Discharge Follow-up with PCP   As directed       Currently Documented PCP:    Camelia Miller, APRN    PCP Phone Number:    [phone]      Follow Up Details:  1 week                 Condition on Discharge:      Stable        Electronically signed by David Suárez MD, 05/06/20, 11:06 AM.      Time: I spent 40minutes on this discharge activity which included face-to-face encounter with the patient/reviewing the data in the system/coordination of the care with the nursing staff as well as consultants/documentation/entering orders.

## 2020-05-07 ENCOUNTER — READMISSION MANAGEMENT (OUTPATIENT)
Dept: CALL CENTER | Facility: HOSPITAL | Age: 80
End: 2020-05-07

## 2020-05-07 NOTE — PROGRESS NOTES
Case Management Discharge Note      Final Note: [facility] - notified of DC.               Home Medical Care - Selection Complete      Service Provider Request Status Selected Services Address Phone Number Fax Number    [facility] [facility] Services [address] [place], [place] [id number] [phone]                   Final Discharge Disposition Code: 06 - home with home health care

## 2020-05-07 NOTE — OUTREACH NOTE
Prep Survey      Responses   [facility] facility patient discharged from?  [facility]   Is LACE score < 7 ?  No   Eligibility  Readm Mgmt   Discharge diagnosis  Weakness, UTI, hypernatremia, malnutrition, hypokalemia, Alzheimer's. GERD. depression, HLD, Osteoarthritis, HTN   COVID-19 Test Status  Not tested   Does the patient have one of the following disease processes/diagnoses(primary or secondary)?  Other   Does the patient have Home health ordered?  Yes   What is the Home health agency?   [facility]   Is there a DME ordered?  No   Comments regarding appointments  See AVS   Prep survey completed?  Yes          Nakia Garsia RN

## 2020-05-08 ENCOUNTER — READMISSION MANAGEMENT (OUTPATIENT)
Dept: CALL CENTER | Facility: HOSPITAL | Age: 80
End: 2020-05-08

## 2020-05-08 NOTE — OUTREACH NOTE
Medical Week 1 Survey      Responses   [facility] patient discharged from?  [facility]   COVID-19 Test Status  Not tested   Does the patient have one of the following disease processes/diagnoses(primary or secondary)?  Other   Is there a successful TCM telephone encounter documented?  No   Week 1 attempt successful?  Yes   Call start time  1549   Call end time  1552   Discharge diagnosis  Weakness, UTI, hypernatremia, malnutrition, hypokalemia, Alzheimer's. GERD. depression, HLD, Osteoarthritis, HTN   Is patient permission given to speak with other caregiver?  Yes   List who call center can speak with  David Price, spouse   Person spoke with today (if not patient) and relationship  spouse   Meds reviewed with patient/caregiver?  Yes   Is the patient having any side effects they believe may be caused by any medication additions or changes?  No   Does the patient have all medications ordered at discharge?  Yes   Is the patient taking all medications as directed (includes completed medication regime)?  Yes   Does the patient have a primary care provider?   Yes   Does the patient have an appointment with their PCP within 7 days of discharge?  Greater than 7 days   Comments regarding PCP  Follow up with SALAS Arizmendi, Friday May 15, 2020 1pm   Nursing Interventions  Verified appointment date/time/provider   Has the patient kept scheduled appointments due by today?  N/A   What is the Home health agency?   [facility]   Has home health visited the patient within 72 hours of discharge?  Yes   Pulse Ox monitoring  Intermittent   Pulse Ox device source  Other [Active HH. ]   Psychosocial issues?  Yes   Psychosocial comments  Advanced Alheimers Dementia   Did the patient receive a copy of their discharge instructions?  Yes   Nursing interventions  Reviewed instructions with patient [spouse]   What is the patient's perception of their health status since discharge?  Improving   Is the patient/caregiver able to teach  back signs and symptoms related to disease process for when to call PCP?  Yes   Is the patient/caregiver able to teach back signs and symptoms related to disease process for when to call 911?  Yes   Is the patient/caregiver able to teach back the hierarchy of who to call/visit for symptoms/problems? PCP, Specialist, Home health nurse, Urgent Care, ED, 911  Yes   Week 1 call completed?  Yes   Wrap up additional comments  Brief call. Spouse denies any needs from follow up nurse today. Short answers.           Nakia Toledo RN

## 2020-05-14 ENCOUNTER — READMISSION MANAGEMENT (OUTPATIENT)
Dept: CALL CENTER | Facility: HOSPITAL | Age: 80
End: 2020-05-14

## 2020-05-14 NOTE — OUTREACH NOTE
Medical Week 2 Survey      Responses   [facility] patient discharged from?  [facility]   COVID-19 Test Status  Not tested   Does the patient have one of the following disease processes/diagnoses(primary or secondary)?  Other   Week 2 attempt successful?  No   Unsuccessful attempts  Attempt 1          Angeles Mendoza LPN

## 2020-05-15 ENCOUNTER — READMISSION MANAGEMENT (OUTPATIENT)
Dept: CALL CENTER | Facility: HOSPITAL | Age: 80
End: 2020-05-15

## 2020-05-15 NOTE — OUTREACH NOTE
Medical Week 2 Survey      Responses   [facility] patient discharged from?  [facility]   COVID-19 Test Status  Not tested   Does the patient have one of the following disease processes/diagnoses(primary or secondary)?  Other   Week 2 attempt successful?  Yes   Call start time  1550   Discharge diagnosis  Weakness, UTI, hypernatremia, malnutrition, hypokalemia, Alzheimer's. GERD. depression, HLD, Osteoarthritis, HTN   Call end time  1551   Is patient permission given to speak with other caregiver?  Yes   List who call center can speak with  David Dee, spouse   Person spoke with today (if not patient) and relationship  spouse   Meds reviewed with patient/caregiver?  Yes   Is the patient having any side effects they believe may be caused by any medication additions or changes?  No   Does the patient have all medications ordered at discharge?  Yes   Is the patient taking all medications as directed (includes completed medication regime)?  Yes   Does the patient have a primary care provider?   Yes   Does the patient have an appointment with their PCP within 7 days of discharge?  Yes   Has the patient kept scheduled appointments due by today?  Yes   What is the Home health agency?   [facility]   Has home health visited the patient within 72 hours of discharge?  Yes   Pulse Ox monitoring  None   Psychosocial issues?  No   Did the patient receive a copy of their discharge instructions?  Yes   Nursing interventions  Reviewed instructions with patient   What is the patient's perception of their health status since discharge?  Improving   Is the patient/caregiver able to teach back signs and symptoms related to disease process for when to call PCP?  Yes   Is the patient/caregiver able to teach back signs and symptoms related to disease process for when to call 911?  Yes   Is the patient/caregiver able to teach back the hierarchy of who to call/visit for symptoms/problems? PCP, Specialist, Home health nurse, Urgent Care, ED,  "911  Yes   Additional teach back comments  Call was brief.  States she is doing \"ok\" and saw her PCP today.  Home health is coming in for therapy.  BP and appetite have been \"ok\"   Week 2 Call Completed?  Yes   Wrap up additional comments  No questions or needs at this time          Angeles Rivera LPN  "

## 2020-05-20 ENCOUNTER — READMISSION MANAGEMENT (OUTPATIENT)
Dept: CALL CENTER | Facility: HOSPITAL | Age: 80
End: 2020-05-20

## 2020-05-20 NOTE — OUTREACH NOTE
Medical Week 3 Survey      Responses   [facility] patient discharged from?  [facility]   COVID-19 Test Status  Not tested   Does the patient have one of the following disease processes/diagnoses(primary or secondary)?  Other   Week 3 attempt successful?  Yes   Call start time  1201   Call end time  1203   Discharge diagnosis  Weakness, UTI, hypernatremia, malnutrition, hypokalemia, Alzheimer's. GERD. depression, HLD, Osteoarthritis, HTN   Is patient permission given to speak with other caregiver?  Yes   List who call center can speak with  David Price, spouse   Person spoke with today (if not patient) and relationship  spouse   Meds reviewed with patient/caregiver?  Yes   Is the patient taking all medications as directed (includes completed medication regime)?  Yes   Has the patient kept scheduled appointments due by today?  Yes   What is the Home health agency?   [facility]   Home health comments  HH still coming    Pulse Ox monitoring  None   Psychosocial comments  Advanced Alheimers Dementia   What is the patient's perception of their health status since discharge?  Improving   Is the patient/caregiver able to teach back signs and symptoms related to disease process for when to call PCP?  Yes   Is the patient/caregiver able to teach back signs and symptoms related to disease process for when to call 911?  Yes   Is the patient/caregiver able to teach back the hierarchy of who to call/visit for symptoms/problems? PCP, Specialist, Home health nurse, Urgent Care, ED, 911  Yes   Additional teach back comments   states she is doing well and eating and drinking well   Week 3 Call Completed?  Yes          Renuka Lama RN

## 2020-05-28 ENCOUNTER — READMISSION MANAGEMENT (OUTPATIENT)
Dept: CALL CENTER | Facility: HOSPITAL | Age: 80
End: 2020-05-28

## 2020-05-28 NOTE — OUTREACH NOTE
Medical Week 4 Survey      Responses   [facility] patient discharged from?  [facility]   COVID-19 Test Status  Not tested   Does the patient have one of the following disease processes/diagnoses(primary or secondary)?  Other   Week 4 attempt successful?  Yes   Call start time  1059   Call end time  1107   Discharge diagnosis  Weakness, UTI, hypernatremia, malnutrition, hypokalemia, Alzheimer's. GERD. depression, HLD, Osteoarthritis, HTN   Is patient permission given to speak with other caregiver?  Yes   List who call center can speak with  David Dee, spouse   Person spoke with today (if not patient) and relationship  spouse   Meds reviewed with patient/caregiver?  Yes   Is the patient having any side effects they believe may be caused by any medication additions or changes?  No   Is the patient taking all medications as directed (includes completed medication regime)?  Yes   Has the patient kept scheduled appointments due by today?  Yes   Is the patient still receiving Home Health Services?  Yes   Home health comments  HH still coming    Pulse Ox monitoring  None   Psychosocial issues?  No   Psychosocial comments  Advanced Alheimers Dementia   What is the patient's perception of their health status since discharge?  New symptoms unrelated to diagnosis [ states she has not had a bm in 3 weeks.]   Is the patient/caregiver able to teach back signs and symptoms related to disease process for when to call PCP?  Yes   Is the patient/caregiver able to teach back signs and symptoms related to disease process for when to call 911?  Yes   Is the patient/caregiver able to teach back the hierarchy of who to call/visit for symptoms/problems? PCP, Specialist, Home health nurse, Urgent Care, ED, 911  Yes   Additional teach back comments   states she has not had a bm in 3 wks.  She is eating and drinking.  No complaints of abd pain.  Has not felt abdomen to see if it hard or distended.  He gave her a stool  softner.  Home health is aware and advised him to give her prune juice which he got today.  Advised to contact PCP regarding this and may suggest something stronger or may have her evaluated.  States he will call them.    Week 4 Call Completed?  Yes   Would the patient like one additional call?  Yes   Wrap up additional comments  No questions or needs at this time          Angeles Rivera LPN

## 2020-05-31 ENCOUNTER — APPOINTMENT (OUTPATIENT)
Dept: CT IMAGING | Facility: HOSPITAL | Age: 80
End: 2020-05-31

## 2020-05-31 ENCOUNTER — HOSPITAL ENCOUNTER (OUTPATIENT)
Facility: HOSPITAL | Age: 80
Setting detail: OBSERVATION
Discharge: HOME HEALTH | End: 2020-06-03
Attending: FAMILY MEDICINE | Admitting: INTERNAL MEDICINE

## 2020-05-31 ENCOUNTER — READMISSION MANAGEMENT (OUTPATIENT)
Dept: CALL CENTER | Facility: HOSPITAL | Age: 80
End: 2020-05-31

## 2020-05-31 ENCOUNTER — APPOINTMENT (OUTPATIENT)
Dept: GENERAL RADIOLOGY | Facility: HOSPITAL | Age: 80
End: 2020-05-31

## 2020-05-31 DIAGNOSIS — E87.6 HYPOKALEMIA: ICD-10-CM

## 2020-05-31 DIAGNOSIS — R53.1 WEAKNESS: Primary | ICD-10-CM

## 2020-05-31 LAB
ALBUMIN SERPL-MCNC: 4.2 G/DL (ref 3.5–5.2)
ALBUMIN/GLOB SERPL: 1.4 G/DL
ALP SERPL-CCNC: 73 U/L (ref 39–117)
ALT SERPL W P-5'-P-CCNC: 15 U/L (ref 1–33)
AMMONIA BLD-SCNC: <10 UMOL/L (ref 11–51)
ANION GAP SERPL CALCULATED.3IONS-SCNC: 16 MMOL/L (ref 5–15)
AST SERPL-CCNC: 26 U/L (ref 1–32)
BACTERIA UR QL AUTO: ABNORMAL /HPF
BASOPHILS # BLD AUTO: 0.1 10*3/MM3 (ref 0–0.2)
BASOPHILS NFR BLD AUTO: 0.8 % (ref 0–1.5)
BILIRUB SERPL-MCNC: 0.6 MG/DL (ref 0.2–1.2)
BILIRUB UR QL STRIP: NEGATIVE
BUN BLD-MCNC: 37 MG/DL (ref 8–23)
BUN/CREAT SERPL: 53.6 (ref 7–25)
CALCIUM SPEC-SCNC: 9.7 MG/DL (ref 8.6–10.5)
CHLORIDE SERPL-SCNC: 105 MMOL/L (ref 98–107)
CLARITY UR: CLEAR
CO2 SERPL-SCNC: 24 MMOL/L (ref 22–29)
COLOR UR: ABNORMAL
CREAT BLD-MCNC: 0.69 MG/DL (ref 0.57–1)
DEPRECATED RDW RBC AUTO: 48.1 FL (ref 37–54)
EOSINOPHIL # BLD AUTO: 0.1 10*3/MM3 (ref 0–0.4)
EOSINOPHIL NFR BLD AUTO: 0.6 % (ref 0.3–6.2)
ERYTHROCYTE [DISTWIDTH] IN BLOOD BY AUTOMATED COUNT: 15 % (ref 12.3–15.4)
GFR SERPL CREATININE-BSD FRML MDRD: 82 ML/MIN/1.73
GLOBULIN UR ELPH-MCNC: 3.1 GM/DL
GLUCOSE BLD-MCNC: 101 MG/DL (ref 65–99)
GLUCOSE UR STRIP-MCNC: NEGATIVE MG/DL
HCT VFR BLD AUTO: 38.8 % (ref 34–46.6)
HGB BLD-MCNC: 13.3 G/DL (ref 12–15.9)
HGB UR QL STRIP.AUTO: NEGATIVE
HYALINE CASTS UR QL AUTO: ABNORMAL /LPF
KETONES UR QL STRIP: ABNORMAL
LEUKOCYTE ESTERASE UR QL STRIP.AUTO: NEGATIVE
LIPASE SERPL-CCNC: 24 U/L (ref 13–60)
LYMPHOCYTES # BLD AUTO: 1.8 10*3/MM3 (ref 0.7–3.1)
LYMPHOCYTES NFR BLD AUTO: 18.1 % (ref 19.6–45.3)
MCH RBC QN AUTO: 31.2 PG (ref 26.6–33)
MCHC RBC AUTO-ENTMCNC: 34.1 G/DL (ref 31.5–35.7)
MCV RBC AUTO: 91.4 FL (ref 79–97)
MONOCYTES # BLD AUTO: 0.5 10*3/MM3 (ref 0.1–0.9)
MONOCYTES NFR BLD AUTO: 5.3 % (ref 5–12)
NEUTROPHILS # BLD AUTO: 7.6 10*3/MM3 (ref 1.7–7)
NEUTROPHILS NFR BLD AUTO: 75.2 % (ref 42.7–76)
NITRITE UR QL STRIP: NEGATIVE
NRBC BLD AUTO-RTO: 0.1 /100 WBC (ref 0–0.2)
PH UR STRIP.AUTO: 6 [PH] (ref 5–8)
PLATELET # BLD AUTO: 228 10*3/MM3 (ref 140–450)
PMV BLD AUTO: 8.6 FL (ref 6–12)
POTASSIUM BLD-SCNC: 2.9 MMOL/L (ref 3.5–5.2)
PROT SERPL-MCNC: 7.3 G/DL (ref 6–8.5)
PROT UR QL STRIP: ABNORMAL
RBC # BLD AUTO: 4.25 10*6/MM3 (ref 3.77–5.28)
RBC # UR: ABNORMAL /HPF
REF LAB TEST METHOD: ABNORMAL
SODIUM BLD-SCNC: 145 MMOL/L (ref 136–145)
SP GR UR STRIP: 1.03 (ref 1–1.03)
SQUAMOUS #/AREA URNS HPF: ABNORMAL /HPF
TROPONIN T SERPL-MCNC: <0.01 NG/ML (ref 0–0.03)
UROBILINOGEN UR QL STRIP: ABNORMAL
WBC NRBC COR # BLD: 10.1 10*3/MM3 (ref 3.4–10.8)
WBC UR QL AUTO: ABNORMAL /HPF

## 2020-05-31 PROCEDURE — G0378 HOSPITAL OBSERVATION PER HR: HCPCS

## 2020-05-31 PROCEDURE — 96360 HYDRATION IV INFUSION INIT: CPT

## 2020-05-31 PROCEDURE — P9612 CATHETERIZE FOR URINE SPEC: HCPCS

## 2020-05-31 PROCEDURE — 84484 ASSAY OF TROPONIN QUANT: CPT | Performed by: PHYSICIAN ASSISTANT

## 2020-05-31 PROCEDURE — 85025 COMPLETE CBC W/AUTO DIFF WBC: CPT | Performed by: PHYSICIAN ASSISTANT

## 2020-05-31 PROCEDURE — 71045 X-RAY EXAM CHEST 1 VIEW: CPT

## 2020-05-31 PROCEDURE — 74176 CT ABD & PELVIS W/O CONTRAST: CPT

## 2020-05-31 PROCEDURE — 82140 ASSAY OF AMMONIA: CPT | Performed by: PHYSICIAN ASSISTANT

## 2020-05-31 PROCEDURE — 99219 PR INITIAL OBSERVATION CARE/DAY 50 MINUTES: CPT | Performed by: PHYSICIAN ASSISTANT

## 2020-05-31 PROCEDURE — 83690 ASSAY OF LIPASE: CPT | Performed by: PHYSICIAN ASSISTANT

## 2020-05-31 PROCEDURE — 80053 COMPREHEN METABOLIC PANEL: CPT | Performed by: PHYSICIAN ASSISTANT

## 2020-05-31 PROCEDURE — 99284 EMERGENCY DEPT VISIT MOD MDM: CPT

## 2020-05-31 PROCEDURE — 81001 URINALYSIS AUTO W/SCOPE: CPT | Performed by: PHYSICIAN ASSISTANT

## 2020-05-31 RX ORDER — BISACODYL 10 MG
10 SUPPOSITORY, RECTAL RECTAL DAILY PRN
Status: DISCONTINUED | OUTPATIENT
Start: 2020-05-31 | End: 2020-06-03 | Stop reason: HOSPADM

## 2020-05-31 RX ORDER — CHOLECALCIFEROL (VITAMIN D3) 125 MCG
5 CAPSULE ORAL NIGHTLY PRN
Status: DISCONTINUED | OUTPATIENT
Start: 2020-05-31 | End: 2020-06-03 | Stop reason: HOSPADM

## 2020-05-31 RX ORDER — ALUMINA, MAGNESIA, AND SIMETHICONE 2400; 2400; 240 MG/30ML; MG/30ML; MG/30ML
15 SUSPENSION ORAL EVERY 6 HOURS PRN
Status: DISCONTINUED | OUTPATIENT
Start: 2020-05-31 | End: 2020-06-03 | Stop reason: HOSPADM

## 2020-05-31 RX ORDER — MAGNESIUM SULFATE HEPTAHYDRATE 40 MG/ML
2 INJECTION, SOLUTION INTRAVENOUS AS NEEDED
Status: DISCONTINUED | OUTPATIENT
Start: 2020-05-31 | End: 2020-06-03 | Stop reason: HOSPADM

## 2020-05-31 RX ORDER — POTASSIUM CHLORIDE 20 MEQ/1
40 TABLET, EXTENDED RELEASE ORAL AS NEEDED
Status: DISCONTINUED | OUTPATIENT
Start: 2020-05-31 | End: 2020-06-03 | Stop reason: HOSPADM

## 2020-05-31 RX ORDER — NITROGLYCERIN 0.4 MG/1
0.4 TABLET SUBLINGUAL
Status: DISCONTINUED | OUTPATIENT
Start: 2020-05-31 | End: 2020-06-03 | Stop reason: HOSPADM

## 2020-05-31 RX ORDER — ACETAMINOPHEN 160 MG/5ML
650 SOLUTION ORAL EVERY 4 HOURS PRN
Status: DISCONTINUED | OUTPATIENT
Start: 2020-05-31 | End: 2020-06-03 | Stop reason: HOSPADM

## 2020-05-31 RX ORDER — PANTOPRAZOLE SODIUM 40 MG/1
40 TABLET, DELAYED RELEASE ORAL EVERY MORNING
Status: DISCONTINUED | OUTPATIENT
Start: 2020-06-01 | End: 2020-06-03 | Stop reason: HOSPADM

## 2020-05-31 RX ORDER — ACETAMINOPHEN 325 MG/1
650 TABLET ORAL EVERY 4 HOURS PRN
Status: DISCONTINUED | OUTPATIENT
Start: 2020-05-31 | End: 2020-06-03 | Stop reason: HOSPADM

## 2020-05-31 RX ORDER — ATORVASTATIN CALCIUM 20 MG/1
20 TABLET, FILM COATED ORAL DAILY
Status: DISCONTINUED | OUTPATIENT
Start: 2020-06-01 | End: 2020-06-03 | Stop reason: HOSPADM

## 2020-05-31 RX ORDER — SODIUM CHLORIDE, SODIUM LACTATE, POTASSIUM CHLORIDE, CALCIUM CHLORIDE 600; 310; 30; 20 MG/100ML; MG/100ML; MG/100ML; MG/100ML
75 INJECTION, SOLUTION INTRAVENOUS CONTINUOUS
Status: DISCONTINUED | OUTPATIENT
Start: 2020-05-31 | End: 2020-06-03 | Stop reason: HOSPADM

## 2020-05-31 RX ORDER — DONEPEZIL HYDROCHLORIDE 5 MG/1
10 TABLET, FILM COATED ORAL NIGHTLY
Status: DISCONTINUED | OUTPATIENT
Start: 2020-05-31 | End: 2020-06-03 | Stop reason: HOSPADM

## 2020-05-31 RX ORDER — CITALOPRAM 20 MG/1
20 TABLET ORAL DAILY
Status: DISCONTINUED | OUTPATIENT
Start: 2020-06-01 | End: 2020-06-03 | Stop reason: HOSPADM

## 2020-05-31 RX ORDER — ASPIRIN 81 MG/1
81 TABLET, CHEWABLE ORAL DAILY
Status: DISCONTINUED | OUTPATIENT
Start: 2020-06-01 | End: 2020-06-03 | Stop reason: HOSPADM

## 2020-05-31 RX ORDER — MELOXICAM 15 MG/1
15 TABLET ORAL DAILY
Status: DISCONTINUED | OUTPATIENT
Start: 2020-06-01 | End: 2020-06-03 | Stop reason: HOSPADM

## 2020-05-31 RX ORDER — ONDANSETRON 4 MG/1
4 TABLET, FILM COATED ORAL EVERY 6 HOURS PRN
Status: DISCONTINUED | OUTPATIENT
Start: 2020-05-31 | End: 2020-06-03 | Stop reason: HOSPADM

## 2020-05-31 RX ORDER — SODIUM CHLORIDE 0.9 % (FLUSH) 0.9 %
10 SYRINGE (ML) INJECTION EVERY 12 HOURS SCHEDULED
Status: DISCONTINUED | OUTPATIENT
Start: 2020-05-31 | End: 2020-06-03 | Stop reason: HOSPADM

## 2020-05-31 RX ORDER — SODIUM CHLORIDE 0.9 % (FLUSH) 0.9 %
10 SYRINGE (ML) INJECTION AS NEEDED
Status: DISCONTINUED | OUTPATIENT
Start: 2020-05-31 | End: 2020-06-03 | Stop reason: HOSPADM

## 2020-05-31 RX ORDER — ACETAMINOPHEN 650 MG/1
650 SUPPOSITORY RECTAL EVERY 4 HOURS PRN
Status: DISCONTINUED | OUTPATIENT
Start: 2020-05-31 | End: 2020-06-03 | Stop reason: HOSPADM

## 2020-05-31 RX ORDER — AMLODIPINE BESYLATE 5 MG/1
10 TABLET ORAL DAILY
Status: DISCONTINUED | OUTPATIENT
Start: 2020-06-01 | End: 2020-06-03 | Stop reason: HOSPADM

## 2020-05-31 RX ORDER — ONDANSETRON 2 MG/ML
4 INJECTION INTRAMUSCULAR; INTRAVENOUS EVERY 6 HOURS PRN
Status: DISCONTINUED | OUTPATIENT
Start: 2020-05-31 | End: 2020-06-03 | Stop reason: HOSPADM

## 2020-05-31 RX ORDER — MAGNESIUM SULFATE 1 G/100ML
1 INJECTION INTRAVENOUS AS NEEDED
Status: DISCONTINUED | OUTPATIENT
Start: 2020-05-31 | End: 2020-06-03 | Stop reason: HOSPADM

## 2020-05-31 RX ADMIN — POTASSIUM CHLORIDE 40 MEQ: 1500 TABLET, EXTENDED RELEASE ORAL at 21:36

## 2020-05-31 RX ADMIN — SODIUM CHLORIDE, SODIUM LACTATE, POTASSIUM CHLORIDE, AND CALCIUM CHLORIDE 100 ML/HR: 600; 310; 30; 20 INJECTION, SOLUTION INTRAVENOUS at 21:36

## 2020-05-31 RX ADMIN — DONEPEZIL HYDROCHLORIDE 10 MG: 5 TABLET, FILM COATED ORAL at 21:36

## 2020-05-31 RX ADMIN — SODIUM CHLORIDE 500 ML: 900 INJECTION, SOLUTION INTRAVENOUS at 15:42

## 2020-05-31 RX ADMIN — Medication 10 ML: at 21:37

## 2020-06-01 PROBLEM — R10.9 ABDOMINAL PAIN: Status: ACTIVE | Noted: 2020-06-01

## 2020-06-01 LAB
ANION GAP SERPL CALCULATED.3IONS-SCNC: 13 MMOL/L (ref 5–15)
BASOPHILS # BLD AUTO: 0 10*3/MM3 (ref 0–0.2)
BASOPHILS NFR BLD AUTO: 0.3 % (ref 0–1.5)
BUN BLD-MCNC: 29 MG/DL (ref 8–23)
BUN/CREAT SERPL: 51.8 (ref 7–25)
CALCIUM SPEC-SCNC: 9.2 MG/DL (ref 8.6–10.5)
CHLORIDE SERPL-SCNC: 110 MMOL/L (ref 98–107)
CO2 SERPL-SCNC: 22 MMOL/L (ref 22–29)
CREAT BLD-MCNC: 0.56 MG/DL (ref 0.57–1)
DEPRECATED RDW RBC AUTO: 47.7 FL (ref 37–54)
EOSINOPHIL # BLD AUTO: 0.1 10*3/MM3 (ref 0–0.4)
EOSINOPHIL NFR BLD AUTO: 1.3 % (ref 0.3–6.2)
ERYTHROCYTE [DISTWIDTH] IN BLOOD BY AUTOMATED COUNT: 14.9 % (ref 12.3–15.4)
GFR SERPL CREATININE-BSD FRML MDRD: 104 ML/MIN/1.73
GLUCOSE BLD-MCNC: 86 MG/DL (ref 65–99)
HBA1C MFR BLD: 5 % (ref 3.5–5.6)
HCT VFR BLD AUTO: 37.5 % (ref 34–46.6)
HGB BLD-MCNC: 13 G/DL (ref 12–15.9)
LYMPHOCYTES # BLD AUTO: 2 10*3/MM3 (ref 0.7–3.1)
LYMPHOCYTES NFR BLD AUTO: 23 % (ref 19.6–45.3)
MAGNESIUM SERPL-MCNC: 2.1 MG/DL (ref 1.6–2.4)
MCH RBC QN AUTO: 31.5 PG (ref 26.6–33)
MCHC RBC AUTO-ENTMCNC: 34.7 G/DL (ref 31.5–35.7)
MCV RBC AUTO: 90.8 FL (ref 79–97)
MONOCYTES # BLD AUTO: 0.5 10*3/MM3 (ref 0.1–0.9)
MONOCYTES NFR BLD AUTO: 5.6 % (ref 5–12)
NEUTROPHILS # BLD AUTO: 6 10*3/MM3 (ref 1.7–7)
NEUTROPHILS NFR BLD AUTO: 69.8 % (ref 42.7–76)
NRBC BLD AUTO-RTO: 0 /100 WBC (ref 0–0.2)
PLATELET # BLD AUTO: 197 10*3/MM3 (ref 140–450)
PMV BLD AUTO: 9.2 FL (ref 6–12)
POTASSIUM BLD-SCNC: 2.9 MMOL/L (ref 3.5–5.2)
POTASSIUM BLD-SCNC: 4.4 MMOL/L (ref 3.5–5.2)
RBC # BLD AUTO: 4.12 10*6/MM3 (ref 3.77–5.28)
SODIUM BLD-SCNC: 145 MMOL/L (ref 136–145)
TSH SERPL DL<=0.05 MIU/L-ACNC: 4.55 UIU/ML (ref 0.27–4.2)
VIT B12 BLD-MCNC: 327 PG/ML (ref 211–946)
WBC NRBC COR # BLD: 8.6 10*3/MM3 (ref 3.4–10.8)

## 2020-06-01 PROCEDURE — 84443 ASSAY THYROID STIM HORMONE: CPT | Performed by: PHYSICIAN ASSISTANT

## 2020-06-01 PROCEDURE — G0378 HOSPITAL OBSERVATION PER HR: HCPCS

## 2020-06-01 PROCEDURE — 85025 COMPLETE CBC W/AUTO DIFF WBC: CPT | Performed by: PHYSICIAN ASSISTANT

## 2020-06-01 PROCEDURE — 80048 BASIC METABOLIC PNL TOTAL CA: CPT | Performed by: PHYSICIAN ASSISTANT

## 2020-06-01 PROCEDURE — 25010000002 CYANOCOBALAMIN PER 1000 MCG: Performed by: INTERNAL MEDICINE

## 2020-06-01 PROCEDURE — 97535 SELF CARE MNGMENT TRAINING: CPT

## 2020-06-01 PROCEDURE — 99225 PR SBSQ OBSERVATION CARE/DAY 25 MINUTES: CPT | Performed by: INTERNAL MEDICINE

## 2020-06-01 PROCEDURE — 83735 ASSAY OF MAGNESIUM: CPT | Performed by: PHYSICIAN ASSISTANT

## 2020-06-01 PROCEDURE — 97162 PT EVAL MOD COMPLEX 30 MIN: CPT

## 2020-06-01 PROCEDURE — 97530 THERAPEUTIC ACTIVITIES: CPT

## 2020-06-01 PROCEDURE — 92610 EVALUATE SWALLOWING FUNCTION: CPT

## 2020-06-01 PROCEDURE — 82607 VITAMIN B-12: CPT | Performed by: PHYSICIAN ASSISTANT

## 2020-06-01 PROCEDURE — 83036 HEMOGLOBIN GLYCOSYLATED A1C: CPT | Performed by: PHYSICIAN ASSISTANT

## 2020-06-01 PROCEDURE — 96372 THER/PROPH/DIAG INJ SC/IM: CPT

## 2020-06-01 PROCEDURE — 84132 ASSAY OF SERUM POTASSIUM: CPT | Performed by: INTERNAL MEDICINE

## 2020-06-01 PROCEDURE — 97166 OT EVAL MOD COMPLEX 45 MIN: CPT

## 2020-06-01 RX ORDER — CYANOCOBALAMIN 1000 UG/ML
1000 INJECTION, SOLUTION INTRAMUSCULAR; SUBCUTANEOUS
Status: DISCONTINUED | OUTPATIENT
Start: 2020-06-01 | End: 2020-06-03 | Stop reason: HOSPADM

## 2020-06-01 RX ORDER — MINERAL OIL 100 G/100G
1 OIL RECTAL ONCE
Status: DISCONTINUED | OUTPATIENT
Start: 2020-06-01 | End: 2020-06-03 | Stop reason: HOSPADM

## 2020-06-01 RX ORDER — HYDROCORTISONE ACETATE 25 MG/1
25 SUPPOSITORY RECTAL 2 TIMES DAILY
Status: DISCONTINUED | OUTPATIENT
Start: 2020-06-01 | End: 2020-06-03 | Stop reason: HOSPADM

## 2020-06-01 RX ADMIN — MELOXICAM 15 MG: 15 TABLET ORAL at 08:49

## 2020-06-01 RX ADMIN — POLYETHYLENE GLYCOL 3350 17 G: 17 POWDER, FOR SOLUTION ORAL at 17:15

## 2020-06-01 RX ADMIN — OYSTER SHELL CALCIUM WITH VITAMIN D 1 TABLET: 500; 200 TABLET, FILM COATED ORAL at 08:49

## 2020-06-01 RX ADMIN — HYDROCORTISONE ACETATE 25 MG: 25 SUPPOSITORY RECTAL at 21:24

## 2020-06-01 RX ADMIN — ATORVASTATIN CALCIUM 20 MG: 20 TABLET, FILM COATED ORAL at 08:49

## 2020-06-01 RX ADMIN — POTASSIUM CHLORIDE 40 MEQ: 1500 TABLET, EXTENDED RELEASE ORAL at 08:49

## 2020-06-01 RX ADMIN — AMLODIPINE BESYLATE 10 MG: 5 TABLET ORAL at 08:49

## 2020-06-01 RX ADMIN — ASPIRIN 81 MG 81 MG: 81 TABLET ORAL at 08:49

## 2020-06-01 RX ADMIN — PANTOPRAZOLE SODIUM 40 MG: 40 TABLET, DELAYED RELEASE ORAL at 06:10

## 2020-06-01 RX ADMIN — POTASSIUM CHLORIDE 40 MEQ: 1500 TABLET, EXTENDED RELEASE ORAL at 06:10

## 2020-06-01 RX ADMIN — CITALOPRAM HYDROBROMIDE 20 MG: 20 TABLET ORAL at 08:49

## 2020-06-01 RX ADMIN — DONEPEZIL HYDROCHLORIDE 10 MG: 5 TABLET, FILM COATED ORAL at 21:24

## 2020-06-01 RX ADMIN — Medication 10 ML: at 21:24

## 2020-06-01 RX ADMIN — Medication 10 ML: at 08:49

## 2020-06-01 RX ADMIN — CYANOCOBALAMIN 1000 MCG: 1000 INJECTION, SOLUTION INTRAMUSCULAR at 17:15

## 2020-06-01 NOTE — PLAN OF CARE
Problem: Patient Care Overview  Goal: Plan of Care Review  Outcome: Ongoing (interventions implemented as appropriate)  Flowsheets (Taken 6/1/2020 7960)  Outcome Summary: 81 y/o F who presented with generalized weakness for 3 days and worsening AMS. PMH Alzheimers dementia. Recent admission for UTI. Patient diagnosed with electrolyte imbalance, dehydration, poss infection, failure to thrive. Patient gets max A with all functional activities, mobility and ADLs from her . He prefers to take her home and is not interested in rehab. This date she requires mod A for bed mobility, mod A of 2 for transfers, mod A of 2 for 20' ambulation. Pt assisted in being cleaned up and was able to have large BM on BSC. She will benefit from continuing her HHPT once d/c'ed. PPE: mask, faceshield, gloves

## 2020-06-01 NOTE — PROGRESS NOTES
Malnutrition Severity Assessment    Patient Name:  Taylor Price  YOB: 1940  MRN: [id number]  Admit Date:  5/31/2020         Comments:    Severe chronic disease malnutrition related to inadequate calorie intake with history of dementia as evidenced by weight loss of 40% in 6 months with inadequate calorie intake for at least 1 month PTA.     Mechanical Soft diet per SLP recommendations. RD has added Boost Plus BID to provide 720kcal and 28g protein if consumed.       Malnutrition present on admission.      Malnutrition Severity Assessment  Malnutrition Type: Chronic Disease - Related Malnutrition     Malnutrition Type (last 8 hours)      Malnutrition Severity Assessment     Row Name 06/01/20 1335       Malnutrition Severity Assessment    Malnutrition Type  Chronic Disease - Related Malnutrition    Row Name 06/01/20 1335       Insufficient Energy Intake     Insufficient Energy Intake   <75% of est. energy requirement for > or equal to 1 month    Row Name 06/01/20 1335       Unintentional Weight Loss     Unintentional Weight Loss   Weight loss greater than 10% in six months          Electronically signed by:  Tiesha Curry RD  06/01/20 13:36

## 2020-06-01 NOTE — THERAPY EVALUATION
Acute Care - Speech Language Pathology   Swallow Initial Evaluation  [facility]     Patient Name: Taylor Price  : 1940  MRN: [id number]  Today's Date: 2020               Admit Date: 2020    Visit Dx:     ICD-10-CM ICD-9-CM   1. Weakness R53.1 780.79   2. Hypokalemia E87.6 276.8     Patient Active Problem List   Diagnosis   • Nondisplaced zone i fracture of sacrum, initial encounter for closed fracture (CMS/[facility])   • Hyperlipidemia   • Hypertension   • Dementia of the Alzheimer's type with late onset without behavioral disturbance (CMS/[facility])   • Osteoarthritis   • Depression   • Age-related physical debility   • Delirium   • GERD (gastroesophageal reflux disease)   • Alzheimer disease (CMS/HCC)   • Weakness   • Hypernatremia   • Malnutrition (CMS/[facility])   • Hypokalemia   • UTI (urinary tract infection), bacterial   • Abdominal pain     Past Medical History:   Diagnosis Date   • Alzheimer disease (CMS/HCC)    • Arthritis    • Depression    • GERD (gastroesophageal reflux disease)    • Hypertension      Past Surgical History:   Procedure Laterality Date   • HYSTERECTOMY          SWALLOW EVALUATION (last 72 hours)      SLP Adult Swallow Evaluation     Row Name 20 1000       Rehab Evaluation    Document Type  evaluation  PPE worn: gloves, mask, face shield  -MM    Subjective Information  no complaints  -MM    Patient Observations  alert;cooperative;agree to therapy pleasantly confused  -MM    Patient/Family Observations  Patient in bed for evaluation.  She will follow most commands but doesn't appropriately answer questions.  Patient was slightly restless, trying to move around in the bed.  -MM    Patient Effort  good  -MM    Comment  Per RN, she gave patient her meds whole in applesauce this morning.  Patient chewed the medication.  She gave patient multiple more bites of applesauce to assist in clearance of chew meds.  No difficulties with this reported.  -MM       General Information    Patient  Profile Reviewed  yes  -MM    Pertinent History Of Current Problem  Patient presented to ED on 5/31 with complaints of generalized weakness for 3 days.  Family reports she has had altered mental status for past few days which has gotten worse.  Patient had a recent admission with UTI.   reports decreased appetite.  PMH: Alzheimers.  Patient seen during previous admission by our department with recommendation for a soft to chew diet  -MM    Current Method of Nutrition  NPO  -MM    Prior Level of Function-Swallowing  -- soft to chew  -MM    Plans/Goals Discussed with  patient  -MM       Oral Motor and Function    Dentition Assessment  natural, present and adequate  -MM    Secretion Management  WNL/WFL  -MM    Mucosal Quality  moist, healthy  -MM       Oral Musculature and Cranial Nerve Assessment    Oral Motor, Comment  Could not fully assess  -MM       General Eating/Swallowing Observations    Respiratory Support Currently in Use  room air  -MM    Eating/Swallowing Skills  fed by SLP  -MM    Positioning During Eating  upright 90 degree;upright in bed  -MM    Utensils Used  spoon;straw  -MM    Consistencies Trialed  regular textures;chopped;pureed;thin liquids  -MM       Respiratory    Respiratory Status  WFL  -MM       Clinical Swallow Eval    Oral Prep Phase  impaired  -MM    Oral Transit  WFL  -MM    Oral Residue  WFL  -MM    Pharyngeal Phase  no overt signs/symptoms of pharyngeal impairment  -MM    Esophageal Phase  unremarkable  -MM    Clinical Swallow Evaluation Summary  Patient seen for clinical swallow evaluation with trials of water by straw, applesauce, peaches and cracker.  Prolonged mastication over a minute for single bite of cracker noted.  Slow but functional mastication for peaches.  Once swallow was initiated there was no oral residue.  Timely swallow initiation for sips of water by straw.  No overt s/s of aspiration noted.  -MM       Oral Prep Concerns    Oral Prep Concerns  prolonged  mastication  -MM    Prolonged Mastication  regular consistencies  -MM    Oral Prep Concerns, Comment  Prolonged mastication for single bite of cracker  -MM       Clinical Impression    SLP Swallowing Diagnosis  mild;oral dysfunction;functional pharyngeal phase  -MM    Functional Impact  no impact on function  -MM    Rehab Potential/Prognosis, Swallowing  good, to achieve stated therapy goals  -MM    Swallow Criteria for Skilled Therapeutic Interventions Met  demonstrates skilled criteria  -MM       Recommendations    Therapy Frequency (Swallow)  PRN  -MM    Predicted Duration Therapy Intervention (Days)  until discharge  -MM    SLP Diet Recommendation  ground;thin liquids  -MM    Recommended Precautions and Strategies  upright posture during/after eating;small bites of food and sips of liquid  -MM    SLP Rec. for Method of Medication Administration  meds whole;with thin liquids;meds crushed;with pudding or applesauce;as tolerated  -MM    Monitor for Signs of Aspiration  yes;notify SLP if any concerns;cough  -MM       Swallow Goals (SLP)    Oral Nutrition/Hydration Goal Selection (SLP)  oral nutrition/hydration, SLP goal 1;oral nutrition/hydration, SLP goal 2  -MM       Oral Nutrition/Hydration Goal 1 (SLP)    Oral Nutrition/Hydration Goal 1, SLP  Patient will be seen at a meal within 24-48 hours to assess tolerance of current diet with further recommendations to be given as indicated  -MM    Time Frame (Oral Nutrition/Hydration Goal 1, SLP)  by discharge  -MM       Oral Nutrition/Hydration Goal 2 (SLP)    Oral Nutrition/Hydration Goal 2, SLP  Patient will tolerate safest and least restrictive diet with no complications from aspiration  -MM    Time Frame (Oral Nutrition/Hydration Goal 2, SLP)  by discharge  -MM      User Key  (r) = Recorded By, (t) = Taken By, (c) = Cosigned By    Initials Name Effective Dates    MM Julita Wren SLP 03/01/19 -           EDUCATION  The patient has been educated in the following  areas:   Modified Diet Instruction.    SLP Recommendation and Plan  SLP Swallowing Diagnosis: mild, oral dysfunction, functional pharyngeal phase  SLP Diet Recommendation: ground, thin liquids  Recommended Precautions and Strategies: upright posture during/after eating, small bites of food and sips of liquid  SLP Rec. for Method of Medication Administration: meds whole, with thin liquids, meds crushed, with pudding or applesauce, as tolerated     Monitor for Signs of Aspiration: yes, notify SLP if any concerns, cough     Swallow Criteria for Skilled Therapeutic Interventions Met: demonstrates skilled criteria     Rehab Potential/Prognosis, Swallowing: good, to achieve stated therapy goals  Therapy Frequency (Swallow): PRN  Predicted Duration Therapy Intervention (Days): until discharge            SLP GOALS     Row Name 06/01/20 1000       Oral Nutrition/Hydration Goal 1 (SLP)    Oral Nutrition/Hydration Goal 1, SLP  Patient will be seen at a meal within 24-48 hours to assess tolerance of current diet with further recommendations to be given as indicated  -MM    Time Frame (Oral Nutrition/Hydration Goal 1, SLP)  by discharge  -MM       Oral Nutrition/Hydration Goal 2 (SLP)    Oral Nutrition/Hydration Goal 2, SLP  Patient will tolerate safest and least restrictive diet with no complications from aspiration  -MM    Time Frame (Oral Nutrition/Hydration Goal 2, SLP)  by discharge  -MM      User Key  (r) = Recorded By, (t) = Taken By, (c) = Cosigned By    Initials Name Provider Type    Julita Gray SLP Speech and Language Pathologist             Time Calculation:       Therapy Charges for Today     Code Description Service Date Service Provider Modifiers Qty    [id number] HC ST EVAL ORAL PHARYNG SWALLOW 5 6/1/2020 Julita Wren SLP GN 1               PHILL Stratton  6/1/2020

## 2020-06-01 NOTE — THERAPY EVALUATION
Acute Care - Occupational Therapy Initial Evaluation   Denis     Patient Name: Taylor Price  : 1940  MRN: [id number]  Today's Date: 2020             Admit Date: 2020       ICD-10-CM ICD-9-CM   1. Weakness R53.1 780.79   2. Hypokalemia E87.6 276.8     Patient Active Problem List   Diagnosis   • Nondisplaced zone i fracture of sacrum, initial encounter for closed fracture (CMS/[facility])   • Hyperlipidemia   • Hypertension   • Dementia of the Alzheimer's type with late onset without behavioral disturbance (CMS/[facility])   • Osteoarthritis   • Depression   • Age-related physical debility   • Delirium   • GERD (gastroesophageal reflux disease)   • Alzheimer disease (CMS/HCC)   • Weakness   • Hypernatremia   • Malnutrition (CMS/[facility])   • Hypokalemia   • UTI (urinary tract infection), bacterial   • Abdominal pain     Past Medical History:   Diagnosis Date   • Alzheimer disease (CMS/HCC)    • Arthritis    • Depression    • GERD (gastroesophageal reflux disease)    • Hypertension      Past Surgical History:   Procedure Laterality Date   • HYSTERECTOMY            OT ASSESSMENT FLOWSHEET (last 12 hours)      Occupational Therapy Evaluation     Row Name 20 1456                   OT Evaluation Time/Intention    Document Type  evaluation  -ES        Mode of Treatment  occupational therapy  -ES        Patient Effort  good  -ES           General Information    Patient Profile Reviewed?  yes spouse has difficulty getting pt to eat. They own w/c, walker, BSC. Pt has been in home health PT. Spouse rpeorts pt has demonstrated a decline from her baseline  -ES        Pertinent History of Current Functional Problem  Pt is 81 yo female presenting with generalized weakness for 3 days and worsening AMS. PMH Alzheimers dementia. Recent admission for UTI. Patient diagnosed with electrolyte imbalance, dehydration, poss infection, failure to thrive. At baseline, pt lives with spouse and does not utilize AE for ambulation.  However, pt requires Max A for most ADLs due to cognitive deficits and difficulty with initiation. Spouse states [facility] PT has been working with pt since last hospitalization.   -ES        Existing Precautions/Restrictions  fall  -ES        Barriers to Rehab  cognitive status  -ES           Relationship/Environment    Lives With  spouse  -ES        Family Caregiver if Needed  spouse  -ES        Primary Roles/Responsibilities  retired English midwife  -ES           Resource/Environmental Concerns    Current Living Arrangements  home/apartment/condo  -ES        Resource/Environmental Concerns  none  -ES        Transportation Concerns  car, none  -ES           Home Main Entrance    Number of Stairs, Main Entrance  two  -ES           Stairs Within Home, Primary    Number of Stairs, Within Home, Primary  one Pt does not use basement  -ES           Cognitive Assessment/Intervention- PT/OT    Orientation Status (Cognition)  disoriented to;person  -ES        Cognitive Assessment/Intervention Comment  Pt unable to state children's names or location. States her name, and recognzes spouse but unable to state relationship. Perseverative on chewing, impeding self-feeding tasks.  -ES           Safety Issues, Functional Mobility    Impairments Affecting Function (Mobility)  endurance/activity tolerance;cognition;balance;postural/trunk control;strength  -ES           Bed Mobility Assessment/Treatment    Bed Mobility Assessment/Treatment  --  -ES        Supine-Sit [place] (Bed Mobility)  --  -ES        Assistive Device (Bed Mobility)  --  -ES        Comment (Bed Mobility)  Pt in chair upon OT arrival.   -ES           Transfer Assessment/Treatment    Transfer Assessment/Treatment  sit-stand transfer;stand-sit transfer  -ES           Bed-Chair Transfer    Bed-Chair [place] (Transfers)  --  -ES           Sit-Stand Transfer    Sit-Stand [place] (Transfers)  moderate assist (50% patient effort);maximum assist (25% patient  effort)  -ES           Stand-Sit Transfer    Stand-Sit [place] (Transfers)  moderate assist (50% patient effort)  -ES           ADL Assessment/Intervention    BADL Assessment/Intervention  upper body dressing;lower body dressing;feeding  -ES           Upper Body Dressing Assessment/Training    Upper Body Dressing [place] Level  maximum assist (25% patient effort)  -ES           Lower Body Dressing Assessment/Training    Lower Body Dressing [place] Level  dependent (less than 25% patient effort)  -ES           Self-Feeding Assessment/Training    [place] Level (Feeding)  maximum assist (25% patient effort)  -ES        Comment (Feeding)  Requires A for initation, loading utensil, and HHA for hand-mouth.  -ES           BADL Safety/Performance    Impairments, BADL Safety/Performance  balance;cognition;endurance/activity tolerance;coordination;motor control;pain;strength;trunk/postural control  -ES           General ROM    GENERAL ROM COMMENTS  Grossly WFL, though difficult to formally assess  -ES           MMT (Manual Muscle Testing)    General MMT Comments  Grossly 3/5, though difficult to formally assess  -ES           Motor Assessment/Interventions    Additional Documentation  Balance (Group)  -ES           Balance    Balance  static sitting balance;static standing balance;dynamic sitting balance  -ES           Static Sitting Balance    Level of [place] (Unsupported Sitting, Static Balance)  supervision  -ES        Sitting Position (Unsupported Sitting, Static Balance)  sitting in chair  -ES        Time Able to Maintain Position (Unsupported Sitting, Static Balance)  more than 5 minutes  -ES           Dynamic Sitting Balance    Level of [place], Reaches Outside Midline (Sitting, Dynamic Balance)  contact guard assist  -ES        Sitting Position, Reaches Outside Midline (Sitting, Dynamic Balance)  sitting in chair  -ES           Static Standing Balance    Level of [place] (Supported  Standing, Static Balance)  maximal assist, 25 to 49% patient effort  -ES        Time Able to Maintain Position (Supported Standing, Static Balance)  1 to 2 minutes  -ES           Sensory Assessment/Intervention    Sensory General Assessment  -- not able to assess due to poor cognition  -ES           Positioning and Restraints    Pre-Treatment Position  sitting in chair/recliner  -ES        Post Treatment Position  chair  -ES        In Chair  exit alarm on;with family/caregiver;encouraged to call for assist;call light within reach  -ES           Pain Scale: FACES Pre/Post-Treatment    Pain: FACES Scale, Pretreatment  0-->no hurt  -ES        Pain: FACES Scale, Post-Treatment  0-->no hurt  -ES           Health Promotion    Additional Documentation  Coping (Group)  -ES           Respiratory WDL    Respiratory WDL  WDL  -ES           Skin WDL    Skin WDL  WDL  -ES           Coping    Observed Emotional State  calm;cooperative  -ES        Verbalized Emotional State  acceptance  -ES           Plan of Care Review    Plan of Care Reviewed With  patient  -ES        Outcome Summary  Pt is 79 yo female presenting with generalized weakness for 3 days and worsening AMS. PMH Alzheimers dementia. Recent admission for UTI. Patient diagnosed with electrolyte imbalance, dehydration, poss infection, failure to thrive. At baseline, pt lives with spouse and does not utilize AE for ambulation. However, pt requires Max A for most ADLs due to cognitive deficits and difficulty with initiation. Spouse states [facility] PT has been working with pt since last hospitalization. Patient severely limited with cognitive impairment, with pt spouse stating this is well below baseline. Pt requires Max A for self-feeding, and demos significant delay with initation. She stands with Max A, though it appears pt does not truly understand what therapist is requesting. Pt unable to state children's names or location. States her name, and recognzes spouse but unable  to state relationship. Perseverative on chewing, impeding self-feeding tasks. Patient requiring Max A for ADLs at home.  Appears patient would qualify for SNF/IP rehab, but questionable learning/carryover. Patient spouse states he prefers to take patient home and continue C services. Rec OT HHC at  [place].  -ES           Clinical Impression (OT)    Therapy Frequency (OT Eval)  daily  -ES        Predicted Duration of Therapy Intervention (Therapy Eval)  until discharge  -ES        Anticipated Discharge Disposition (OT)  home with home health  -ES           OT Goals    Transfer Goal Selection (OT)  transfer, OT goal 1  -ES        Dressing Goal Selection (OT)  dressing, OT goal 1  -ES        Toileting Goal Selection (OT)  toileting, OT goal 1  -ES           Transfer Goal 1 (OT)    Activity/Assistive Device (Transfer Goal 1, OT)  toilet  -ES        [place] Level/Cues Needed (Transfer Goal 1, OT)  moderate assist (50-74% patient effort)  -ES        Time Frame (Transfer Goal 1, OT)  2 weeks  -ES           Dressing Goal 1 (OT)    Activity/Assistive Device (Dressing Goal 1, OT)  dressing skills, all  -ES        [place]/Cues Needed (Dressing Goal 1, OT)  maximum assist (25-49% patient effort)  -ES        Time Frame (Dressing Goal 1, OT)  2 weeks  -ES           Toileting Goal 1 (OT)    Activity/Device (Toileting Goal 1, OT)  toileting skills, all  -ES        [place] Level/Cues Needed (Toileting Goal 1, OT)  maximum assist (25-49% patient effort)  -ES        Time Frame (Toileting Goal 1, OT)  2 weeks  -ES          User Key  (r) = Recorded By, (t) = Taken By, (c) = Cosigned By    Initials Name Effective Dates    ES Katy Jackson OT 03/01/19 -          Occupational Therapy Education                 Title: PT OT SLP Therapies (In Progress)     Topic: Occupational Therapy (In Progress)     Point: ADL training (In Progress)     Description:   Instruct learner(s) on proper safety adaptation and remediation  techniques during self care or transfers.   Instruct in proper use of assistive devices.              Learning Progress Summary           Patient Acceptance, E, NR,NL by  at 6/1/2020 1514   Family Acceptance, E, NR,NL by ES at 6/1/2020 1514                   Point: Home exercise program (Not Started)     Description:   Instruct learner(s) on appropriate technique for monitoring, assisting and/or progressing therapeutic exercises/activities.              Learner Progress:   Not documented in this visit.          Point: Precautions (Not Started)     Description:   Instruct learner(s) on prescribed precautions during self-care and functional transfers.              Learner Progress:   Not documented in this visit.          Point: Body mechanics (Not Started)     Description:   Instruct learner(s) on proper positioning and spine alignment during self-care, functional mobility activities and/or exercises.              Learner Progress:   Not documented in this visit.                      User Key     Initials Effective Dates Name Provider Type Discipline     03/01/19 -  Katy Jackson OT Occupational Therapist OT                  OT Recommendation and Plan  Outcome Summary/Treatment Plan (OT)  Anticipated Discharge Disposition (OT): home with home health  Therapy Frequency (OT Eval): daily  Plan of Care Review  Plan of Care Reviewed With: patient  Plan of Care Reviewed With: patient  Outcome Summary: Pt is 79 yo female presenting with generalized weakness for 3 days and worsening AMS. PMH Alzheimers dementia. Recent admission for UTI. Patient diagnosed with electrolyte imbalance, dehydration, poss infection, failure to thrive. At baseline, pt lives with spouse and does not utilize AE for ambulation. However, pt requires Max A for most ADLs due to cognitive deficits and difficulty with initiation. Spouse states [facility] PT has been working with pt since last hospitalization. Patient severely limited with cognitive  impairment, with pt spouse stating this is well below baseline. Pt requires Max A for self-feeding, and demos significant delay with initation. She stands with Max A, though it appears pt does not truly understand what therapist is requesting. Pt unable to state children's names or location. States her name, and recognzes spouse but unable to state relationship. Perseverative on chewing, impeding self-feeding tasks. Patient requiring Max A for ADLs at home.  Appears patient would qualify for SNF/IP rehab, but questionable learning/carryover. Patient spouse states he prefers to take patient home and continue HHC services. Rec OT HHC at  [place].        Time Calculation:   Time Calculation- OT     Row Name 06/01/20 1514             Time Calculation- OT    OT Start Time  1445  -ES      OT Stop Time  1508  -ES      OT Time Calculation (min)  23 min  -ES      Total Timed Code Minutes- OT  8 minute(s)  -ES      OT Non-Billable Time (min)  15 min  -ES      OT Received On  06/01/20  -ES      OT - Next Appointment  06/02/20  -ES      OT Goal Re-Cert Due Date  06/15/20  -ES        User Key  (r) = Recorded By, (t) = Taken By, (c) = Cosigned By    Initials Name Provider Type    ES Katy Jackson OT Occupational Therapist        Therapy Charges for Today     Code Description Service Date Service Provider Modifiers Qty    [id number]  OT SELF CARE/MGMT/TRAIN EA 15 MIN 6/1/2020 Katy Jackson OT GO 1    [id number]  OT EVAL MOD COMPLEXITY 3 6/1/2020 Katy Jackson OT GO 1               Katy Jackson OT  6/1/2020

## 2020-06-01 NOTE — PROGRESS NOTES
[facility] Medicine Services Daily Progress Note      Hospitalist Team  LOS 0 days      Patient Care Team:  Camelia Miller APRN as PCP - General    Patient Location: 363/1      Subjective   Subjective     Chief Complaint / Subjective  Chief Complaint   Patient presents with   • Weakness - Generalized         Brief Synopsis of Hospital Course/HPI  female presents to [facility] ER on 5/31/2020 complaining of generalized weakness for the past 3 days.  It was reported by family that patient has had altered mental status that is worsened over the past few days and generally weak.  The  reports that this happened about 3 weeks ago and she was found to have a UTI.  Patient is a poor historian and unable to answer questions or follow verbal commands.  Patient has a history of Alzheimer's dementia.  It was reported by the  that patient also has decreased appetite but denies any history of fever, coughing, nausea, vomiting, diarrhea.  I have  personally attempted to call family but unsuccessful.     Patient was admitted to [facility] on 5/3/2020 for UTI, weakness, decreased oral intake and hypokalemia.  Patient diagnosed with debility, increased weakness with failure to thrive likely related to worsening Alzheimer's dementia.     In the ED, initial troponin negative, potassium low at 2.9, anion gap of 16.0, serum creatinine 0.69 and otherwise unremarkable CMP.  Lipase normal at 24.  CBC unremarkable.  UA showed 1+ ketones and 1+ protein and 3-5 WBCs but otherwise unremarkable.  Chest x-ray showed no active disease.  Patient is afebrile, pulse in the 60s, on room air oxygen at 97% SPO2 and blood pressure 140s over 60s.  Patient given 500 mL normal saline bolus in ED.        6/1/2020  - PT had a large bowel movement. Remains somewhat confused.  reports she is bowel baseline.       Review of Systems   Unable to perform ROS: mental status change          "        Objective   Objective      Vital Signs  Temp:  [96.2 °F (35.7 °C)-97.7 °F (36.5 °C)] 97.7 °F (36.5 °C)  Heart Rate:  [56-79] 79  Resp:  [14-17] 15  BP: (104-171)/(54-84) 151/82  Oxygen Therapy  SpO2: 100 %  Pulse Oximetry Type: Intermittent  Device (Oxygen Therapy): room air  Flowsheet Rows      First Filed Value   Admission Height  157.5 cm (62\") Documented at 05/31/2020 1452   Admission Weight  48 kg (105 lb 13.1 oz) Documented at 05/31/2020 1452        Intake & Output (last 3 days)       05/29 0701 - 05/30 0700 05/30 0701 - 05/31 0700 05/31 0701 - 06/01 0700 06/01 0701 - 06/02 0700    P.O.    0    Total Intake(mL/kg)    0 (0)    Net    0            Urine Unmeasured Occurrence    2 x        Lines, Drains & Airways    Active LDAs     Name:   Placement date:   Placement time:   Site:   Days:    Peripheral IV 05/31/20 1526 Left Antecubital   05/31/20    1526    Antecubital   1                  Physical Exam:    Physical Exam   Constitutional: She appears well-developed and well-nourished. No distress.   HENT:   Head: Normocephalic and atraumatic.   Mouth/Throat: Oropharynx is clear and moist.   Eyes: Pupils are equal, round, and reactive to light. No scleral icterus.   Cardiovascular: Normal rate and regular rhythm.   No murmur heard.  Pulmonary/Chest: Effort normal and breath sounds normal. No respiratory distress. She has no wheezes.   Abdominal: Soft. Bowel sounds are normal.   Moderately ttp lower abdomen    Skin: Skin is warm and dry. No rash noted.   Vitals reviewed.            Procedures:              Results Review:     I reviewed the patient's new clinical results.      Lab Results (last 24 hours)     Procedure Component Value Units Date/Time    Hemoglobin A1c [[id number]]  (Normal) Collected:  06/01/20 0322    Specimen:  Blood Updated:  06/01/20 1113     Hemoglobin A1C 5.0 %     Narrative:       Hemoglobin A1C Reference Range:    <5.7 %        Normal  5.7-6.4 %     Increased risk for diabetes  > 6.4 " %        Diabetes       These guidelines have been recommended by the American Diabetic Association for Hgb A1c.      The following 2010 guidelines have been recommended by the American Diabetes Association for Hemoglobin A1c.    HBA1c 5.7-6.4% Increased risk for future diabetes (pre-diabetes)  HBA1c     >6.4% Diabetes      Vitamin B12 [[id number]]  (Normal) Collected:  06/01/20 0322    Specimen:  Blood Updated:  06/01/20 1056     Vitamin B-12 327 pg/mL     Narrative:       Results may be falsely increased if patient taking Biotin.      TSH [[id number]]  (Abnormal) Collected:  06/01/20 0322    Specimen:  Blood Updated:  06/01/20 0508     TSH 4.550 uIU/mL      Comment: TSH results may be falsely decreased if patient taking Biotin.       Basic Metabolic Panel [[id number]]  (Abnormal) Collected:  06/01/20 0322    Specimen:  Blood Updated:  06/01/20 0506     Glucose 86 mg/dL      BUN 29 mg/dL      Creatinine 0.56 mg/dL      Sodium 145 mmol/L      Potassium 2.9 mmol/L      Chloride 110 mmol/L      CO2 22.0 mmol/L      Calcium 9.2 mg/dL      eGFR Non African Amer 104 mL/min/1.73      BUN/Creatinine Ratio 51.8     Anion Gap 13.0 mmol/L     Narrative:       GFR Normal >60  Chronic Kidney Disease <60  Kidney Failure <15      Magnesium [[id number]]  (Normal) Collected:  06/01/20 0322    Specimen:  Blood Updated:  06/01/20 0506     Magnesium 2.1 mg/dL     CBC Auto Differential [[id number]]  (Normal) Collected:  06/01/20 0322    Specimen:  Blood Updated:  06/01/20 0421     WBC 8.60 10*3/mm3      RBC 4.12 10*6/mm3      Hemoglobin 13.0 g/dL      Hematocrit 37.5 %      MCV 90.8 fL      MCH 31.5 pg      MCHC 34.7 g/dL      RDW 14.9 %      RDW-SD 47.7 fl      MPV 9.2 fL      Platelets 197 10*3/mm3      Neutrophil % 69.8 %      Lymphocyte % 23.0 %      Monocyte % 5.6 %      Eosinophil % 1.3 %      Basophil % 0.3 %      Neutrophils, Absolute 6.00 10*3/mm3      Lymphocytes, Absolute 2.00 10*3/mm3      Monocytes, Absolute 0.50 10*3/mm3       Eosinophils, Absolute 0.10 10*3/mm3      Basophils, Absolute 0.00 10*3/mm3      nRBC 0.0 /100 WBC     Ammonia [[id number]]  (Abnormal) Collected:  05/31/20 2000    Specimen:  Blood Updated:  05/31/20 2049     Ammonia <10 umol/L         Hemoglobin A1C   Date Value Ref Range Status   06/01/2020 5.0 3.5 - 5.6 % Final               Lab Results   Component Value Date    LIPASE 24 05/31/2020     Lab Results   Component Value Date    CHOL 283 (H) 05/04/2020    CHLPL 209 (H) 09/27/2019    TRIG 132 05/04/2020    HDL 57 05/04/2020     (H) 05/04/2020       No results found for: INTRAOP, PREDX, FINALDX, COMDX    Microbiology Results (last 10 days)     ** No results found for the last 240 hours. **          ECG/EMG Results (most recent)     None                    Ct Abdomen Pelvis Without Contrast    Result Date: 5/31/2020  1. A large colonic stool burden is present, including the rectum which is distended up to 8 cm in diameter. Perirectal edema is present. Findings are consistent with stercoral colitis. 2. No change in a 6.4 cm cystic lesion along the left pelvic sidewall. 3. Unchanged T12 and pubic rami fractures. 4. Additional chronic findings as above. Electronically signed by:  Blake Wren M.D.  5/31/2020 7:00 PM    Xr Chest 1 View    Result Date: 5/31/2020  No active disease, stable chest x-ray.  Electronically Signed By-Russell Powell On:5/31/2020 4:04 PM This report was finalized on [id number] by  Russell Powell, .          Xrays, labs reviewed personally by physician.    Medication Review:   I have reviewed the patient's current medication list      Scheduled Meds    amLODIPine 10 mg Oral Daily   aspirin 81 mg Oral Daily   atorvastatin 20 mg Oral Daily   calcium 500 mg vitamin D 5 mcg (200 UT) 1 tablet Oral Daily   citalopram 20 mg Oral Daily   donepezil 10 mg Oral Nightly   meloxicam 15 mg Oral Daily   mineral oil 1 enema Rectal Once   pantoprazole 40 mg Oral QAM   sodium chloride 10 mL Intravenous Q12H              Meds Infusions    lactated ringers 100 mL/hr Last Rate: Stopped (05/31/20 5037)       Meds PRN  •  acetaminophen **OR** acetaminophen **OR** acetaminophen  •  aluminum-magnesium hydroxide-simethicone  •  bisacodyl  •  magnesium hydroxide  •  magnesium sulfate **OR** magnesium sulfate in D5W 1g/100mL (PREMIX)  •  melatonin  •  nitroglycerin  •  ondansetron **OR** ondansetron  •  potassium chloride  •  [COMPLETED] Insert peripheral IV **AND** sodium chloride  •  sodium chloride    I personally reviewed patient's x-ray films and my findings are     I personally reviewed patient's EKG strips and my findings are     Assessment/Plan   Assessment/Plan     Active Hospital Problems    Diagnosis  POA   • **Weakness [R53.1]  Yes   • Abdominal pain [R10.9]  Yes   • Malnutrition (CMS/HCC) [E46]  Yes   • Hypokalemia [E87.6]  Yes   • GERD (gastroesophageal reflux disease) [K21.9]  Yes   • Hyperlipidemia [E78.5]  Yes   • Dementia of the Alzheimer's type with late onset without behavioral disturbance (CMS/HCC) [G30.1, F02.80]  Yes   • Depression [F32.9]  Yes   • Hypertension [I10]  Yes      Resolved Hospital Problems   No resolved problems to display.       MEDICAL DECISION MAKING COMPLEXITY BY PROBLEM:     Altered mental status with generalized weakness and decreased oral intake  - likely multifactorial  - initial troponin negative, potassium low at 2.9,  - Chest x-ray showed no active disease.  - UA negative   - continue IV fluids  - B12 327, will add supplement  - TSH 4.55  - Nutrition consult  - decrease IV fluids to 75cc/hr   - Pt/OT/CM    Abdominal pain likely d/t constipation and fecal impaction   - CT abd/pel reviewed   - mineral oil enema  - pt had a bowel movement 6/1/20  - start miralax daily      Hypokalemia  - potassium replacement protocol   - magnesium 2.1      Alzheimer's dementia  - Reported baseline patient is more aware and talkative  - Continue Aricept     Malnutrition, BMI 19.35  - Nutrition consult  - BMI  continues to decrease over the past 6 months     Essential hypertension, poorly controlled  - Continue home Norvasc     Depression  - Continue home Celexa     GERD  - Continue home PPI     Hyperlipidemia  - Continue home statin     VTE Prophylaxis   - SCDs    Dispo- home with home health vs inpatient rehab      VTE Prophylaxis -   Mechanical Order History:      Ordered        05/31/20 1903  Place Sequential Compression Device  Once         05/31/20 1903  Maintain Sequential Compression Device  Continuous                 Pharmalogical Order History:     None            Code Status -   Code Status and Medical Interventions:   Ordered at: 05/31/20 1817     Code Status:    CPR     Medical Interventions (Level of Support Prior to Arrest):    Full           Discharge Planning      SLP OP Goals     Row Name 06/01/20 1451          Time Calculation    PT Goal Re-Cert Due Date  06/15/20  -SS       User Key  (r) = Recorded By, (t) = Taken By, (c) = Cosigned By    Initials Name Provider Type    Loren Hopkins, PT Physical Therapist          Destination      Coordination has not been started for this encounter.      Durable Medical Equipment      Coordination has not been started for this encounter.      Dialysis/Infusion      Coordination has not been started for this encounter.      Home Medical Care      Service Provider Request Status Selected Services Address Phone Number Fax Number    [facility] Accepted N/A [address] [address] [phone]      Therapy      Coordination has not been started for this encounter.      Community Resources      Coordination has not been started for this encounter.            Electronically signed by Vaughn Hewitt DO, 06/01/20, 16:09.  [facility] Hospitalist Team

## 2020-06-01 NOTE — CONSULTS
Nutrition Services    Patient Name:  Taylor Price  YOB: 1940  MRN: [id number]  Admit Date:  5/31/2020      Comments:   Severe chronic disease malnutrition related to inadequate calorie intake with history of dementia as evidenced by weight loss of 40% in 6 months with inadequate calorie intake for at least 1 month PTA.    Mechanical Soft diet per SLP recommendations. RD has added Boost Plus BID to provide 720kcal and 28g protein if consumed.      Reason for Assessment                Reason for Assessment    Reason For Assessment  6/1/20: MST 3, chronic poor po intake consult       Diagnosis H&P:   80 y.o. female presents to [facility] ER on 5/31/2020 complaining of generalized weakness for the past 3 days.  It was reported by family that patient has had altered mental status that is worsened over the past few days and generally weak.  The  reports that this happened about 3 weeks ago and she was found to have a UTI.  Patient is a poor historian and unable to answer questions or follow verbal commands.    PMH includes Alzheimer's dementia    Current Problems:  Altered mental status with generalized weakness and decreased oral intake  -Possibly due to electrolyte imbalance, dehydration, possible abdominal process/infection, worsening failure to thrive due to alzheimers     Abdominal pain  -Tender to palpation  -Lipase normal,   -UA showed 1+ ketones and 1+ protein and 3-5 WBCs but otherwise unremarkable.  -CT scan of abdomen ordered     Hypokalemia  -potassium low at 2.9  -Replacement protocol ordered  -Check BMP     Alzheimer's dementia  -Reported baseline patient is more aware and talkative  -Continue Aricept     Malnutrition, BMI 19.35  -Nutrition consult  -BMI continues to decrease over the past 6 months     Essential hypertension, poorly controlled  -Continue home Norvasc     Depression  -Continue home Celexa     GERD  -Continue home PPI     Hyperlipidemia  -Continue home  statin             Nutrition/Diet History                Nutrition/Diet History    Narrative     6/1: Secure message with patient's RN, Brandy, who reports no intake to note at this time as SLP just cleared for po diet. Pt is confused, not appropriate for telephone call and no family in room at this time. Pt is known to RD from recent admit where poor po intake was documented for at least 1 month PTA. Weight continues to decrease with report again of poor intake PTA.       Functional Status Pt is dependent on her  for feeding and bathing. Lives at home with spouse.     Food Allergies NKFA     Factors Affecting Nutritional Intake  Dementia         Anthropometrics             Anthropometrics    Height 157.5cm, 62in    Weight 48kg, 106lb, 5/31       Admit Weight    Admit Weight 106lb, 5/31       Ideal Body Weight (IBW)    Ideal Body Weight (IBW) 110lb    % Ideal Body Weight 96%       Usual Body Weight (UBW)    Usual Body Weight ELISEO    % Usual Body Weight ELISEO    Weight Hx  5/3/20 109lb  12/2019 175lb         Body Mass Index (BMI)    BMI (kg/m2) 19.35           Labs/Medications                Labs    Pertinent Lab Results Comments K 2.9 low, BUN 29 elev, Creat 0.56 low        Medications    Pertinent Medications Comments Norvasc, ASA, [facility] +D, Protonix, KCl         Physical Findings                Physical Findings    Overall Physical Appearance Unable to assess in person related to limiting patient interaction during COVID-19 pandemic.     Edema  None documented     Gastrointestinal No BM since admission     Tubes none     Oral/Mouth Cavity SLP eval, mechanically altered diet placed     Skin No breakdown noted         Estimated/Assessed Needs              Calorie Requirements     Height Used for Calorie Calculations       Weight Used for Calorie Calculations      Formula chosen for Calorie Calculations       Estimated Calorie Requirements (kcals/day)              Protein Requirements    Weight Used For Protein  Calculations       Est Protein Requirement Amount (gms/kg)       Estimated Protein Requirements (gms/day)          Fluid Requirements     Estimated Fluid Requirements (mL/day)            Fluid Deficit       Desired Sodium Level (mEq/L)      Desired Sodium Level (mEq/L)      Estimated Fluid Deficit Needs (L)           Nutrition Prescription Ordered                Nutrition Prescription PO    Current PO Diet  Mechanical Soft     Supplement         Nutrition Prescription EN    Enteral Route -     TF modular -     TF Delivery Method -     Current Ordered TF  -     Current Ordered Water flush  -     TF Observation  -        Nutrition Prescription TPN    TPN Route -     Current Ordered TPN Volume  -     Dextrose (gm/kcals)  -     Amino Acid (gm/kcals) -     Lipids (ML/Concentration/Frequency)  -     TPN Observation  -          Evaluation of Received Nutrient/Fluid Intake                PO Evaluation    % PO Intake No intake documented        EN Evaluation    TF Changes -     TF Residual -     TF Tolerance      Average EN Delivered  -        TPN Evaluation    Total Number of Days on TPN  -           Clinical Course      Clinical Nutrition Course Details  5/31 NPO  6/1 Mechanical Soft         Problem/Interventions:                     Nutrition Diagnoses Problem 1      Problem 1   Severe chronic disease malnutrition related to inadequate calorie intake with history of dementia as evidenced by weight loss of 40% in 6 months with inadequate calorie intake for at least 1 month PTA.     Nutrition Diagnoses Problem 2      Problem 2            Intervention Goal                Intervention Goal    General Meal intake at least 50%.    Pt consumes at least one ONS daily.         Nutrition Intervention                Nutrition Intervention    RD/Tech Action  Add Boost Plus BID         Nutrition Prescription                Nutrition Prescription PO      Diet  Mechanical Soft     Supplement Boost Plus BID        Nutrition Prescription  EN    Enteral Prescription -        Nutrition Prescription TPN    TPN Prescription -         Monitor/Evaluation                Monitor/Evaluation    Monitor Intake, weight, labs, ONS acceptance, skin, BM             Electronically signed by:  Tiesha Curry RD  06/01/20 12:48

## 2020-06-01 NOTE — DISCHARGE PLACEMENT REQUEST
"Viraj Price (80 y.o. Female)     Date of Birth Social Security Number Address Home Phone MRN    1940  [address] [place] [address] [phone] [id number]    Gnosticist Marital Status          Non-Jain        Admission Date Admission Type Admitting Provider Attending Provider Department, Room/Bed    5/31/20 Emergency Vaughn Hewitt DO [place], [place], DO [facility] 3C MEDICAL INPATIENT, 363/1    Discharge Date Discharge Disposition Discharge Destination                       Attending Provider:  Vaughn Hewitt DO    Allergies:  No Known Allergies    Isolation:  None   Infection:  None   Code Status:  CPR    Ht:  157.5 cm (62.01\")   Wt:  48 kg (105 lb 13.1 oz)    Admission Cmt:  None   Principal Problem:  Weakness [R53.1]                 Active Insurance as of 5/31/2020     Primary Coverage     Payor Plan Insurance Group Employer/Plan Group    HUMANA MEDICARE REPLACEMENT HUMANA MEDICARE REPLACEMENT [id number]     Payor Plan Address Payor Plan Phone Number Payor Plan Fax Number Effective Dates    [address] [address] [phone]  1/1/2018 - None Entered    [facility] [address]       Subscriber Name Subscriber Birth Date Member ID       VIRAJ PRICE 1940 [id number]                 Emergency Contacts      (Rel.) Home Phone Work Phone Mobile Phone    ANDRESSA PRICE (Spouse) [phone] -- [phone]            Emergency Contact Information     Name Relation Home Work Mobile    ANDRESSA PRICE Spouse [phone]          Insurance Information                HUMANA MEDICARE REPLACEMENT/HUMANA MEDICARE REPLACEMENT Phone: [phone]    Subscriber: Viraj Price Subscriber#: [id number]    Group#: [id number] Precert#:           "

## 2020-06-01 NOTE — PLAN OF CARE
Pt has been resting comfortably overnight with no complaints. Pt is confused. Will continue to monitor...

## 2020-06-01 NOTE — THERAPY EVALUATION
Patient Name: Taylor Price  : 1940    MRN: [id number]                              Today's Date: 2020       Admit Date: 2020    Visit Dx:     ICD-10-CM ICD-9-CM   1. Weakness R53.1 780.79   2. Hypokalemia E87.6 276.8     Patient Active Problem List   Diagnosis   • Nondisplaced zone i fracture of sacrum, initial encounter for closed fracture (CMS/[facility])   • Hyperlipidemia   • Hypertension   • Dementia of the Alzheimer's type with late onset without behavioral disturbance (CMS/[facility])   • Osteoarthritis   • Depression   • Age-related physical debility   • Delirium   • GERD (gastroesophageal reflux disease)   • Alzheimer disease (CMS/HCC)   • Weakness   • Hypernatremia   • Malnutrition (CMS/HCC)   • Hypokalemia   • UTI (urinary tract infection), bacterial   • Abdominal pain     Past Medical History:   Diagnosis Date   • Alzheimer disease (CMS/HCC)    • Arthritis    • Depression    • GERD (gastroesophageal reflux disease)    • Hypertension      Past Surgical History:   Procedure Laterality Date   • HYSTERECTOMY       General Information     Row Name 20 1427          PT Evaluation Time/Intention    Document Type  evaluation  -SS     Mode of Treatment  physical therapy  -SS     Row Name 20 1427          General Information    Patient Profile Reviewed?  yes spouse has difficulty getting pt to eat. They own w/c, walker, BSC. Pt has been in home health PT. Spouse rpeorts pt has demonstrated a decline from her baseline  -SS     Prior Level of Function  max assist:;bed mobility;ADL's;all household mobility;grooming;dressing;bathing;feeding patient lives with her  who is her full time caregiver. She is max A for bathing, dressing, bed mobility, transfers, toileting, ambulation. Spouse usually holds her hands and walks backwards. He does not have other help but states that he is okay  -     Existing Precautions/Restrictions  fall  -SS     Barriers to Rehab  cognitive status  -SS     Row Name  06/01/20 1427          Relationship/Environment    Lives With  spouse  -     Row Name 06/01/20 1427          Resource/Environmental Concerns    Current Living Arrangements  home/apartment/condo  -     Row Name 06/01/20 1427          Home Main Entrance    Number of Stairs, Main Entrance  two  -     Row Name 06/01/20 1427          Stairs Within Home, Primary    Number of Stairs, Within Home, Primary  one  -     Row Name 06/01/20 1427          Cognitive Assessment/Intervention- PT/OT    Orientation Status (Cognition)  disoriented to;place;situation;time;oriented to;person  -     Row Name 06/01/20 1427          Safety Issues, Functional Mobility    Impairments Affecting Function (Mobility)  endurance/activity tolerance;cognition;balance;postural/trunk control;strength  -       User Key  (r) = Recorded By, (t) = Taken By, (c) = Cosigned By    Initials Name Provider Type     Loren Mendoza, PT Physical Therapist        Mobility     Row Name 06/01/20 1435          Bed Mobility Assessment/Treatment    Bed Mobility Assessment/Treatment  supine-sit  -SS     Supine-Sit [place] (Bed Mobility)  moderate assist (50% patient effort)  -     Assistive Device (Bed Mobility)  head of bed elevated  -     Comment (Bed Mobility)  patient with difficulty following cues for all functional activities- requires physical assist. Required extended time sitting EOB while waiting for help. During this time she would select to return to sidelying due to apparent fatigue. Required assist to remain sitting EOB.   -     Row Name 06/01/20 1437          Transfer Assessment/Treatment    Comment (Transfers)  patient performed multipl transfers from bed, to BSC, to chair. She would not stand without hand held assist. She was forward flexed in standing and required mod A to remain standing for abiodun hygeine after use of BSC. She requires A of 2 for transfers due to poor safety and command following.   -     Row Name 06/01/20  1435          Bed-Chair Transfer    Bed-Chair [place] (Transfers)  moderate assist (50% patient effort);2 person assist  -SS     Row Name 06/01/20 1435          Sit-Stand Transfer    Sit-Stand [place] (Transfers)  moderate assist (50% patient effort);2 person assist  -SS     Row Name 06/01/20 1435          Gait/Stairs Assessment/Training    [place] Level (Gait)  moderate assist (50% patient effort);2 person assist  -SS     Distance in Feet (Gait)  20'  -SS     Deviations/Abnormal Patterns (Gait)  base of support, narrow;ataxic  -SS     Bilateral Gait Deviations  forward flexed posture  -SS     Comment (Gait/Stairs)  variability of gait noted/staggering gait shayan with head turns when pt distracted. Pts spouse typically walks backards and hold her hands. Difficulty following directions for direction of walking.   -       User Key  (r) = Recorded By, (t) = Taken By, (c) = Cosigned By    Initials Name Provider Type     Loren Mendoza, PT Physical Therapist        Obj/Interventions     [facility] Name 06/01/20 1444          General ROM    GENERAL ROM COMMENTS  grossly WFl  -[facility] Name 06/01/20 1444          MMT (Manual Muscle Testing)    General MMT Comments  LEs 3+/5 per functional assessment. Pt does not follow commands for strength testing  -[facility] Name 06/01/20 1444          Static Sitting Balance    Level of [place] (Unsupported Sitting, Static Balance)  minimal assist, 75% patient effort;moderate assist, 50 to 74% patient effort  -     Time Able to Maintain Position (Unsupported Sitting, Static Balance)  4 to 5 minutes  -[facility] Name 06/01/20 1444          Static Standing Balance    Level of [place] (Supported Standing, Static Balance)  moderate assist, 50 to 74% patient effort;2 person assist  -SS     [facility] Name 06/01/20 1444          Sensory Assessment/Intervention    Sensory General Assessment  -- not able to assess due to poor cognition  -       User Key  (r) = Recorded  By, (t) = Taken By, (c) = Cosigned By    Initials Name Provider Type     Loren Mendoza, PT Physical Therapist        Goals/Plan     Row Name 06/01/20 1449          Bed Mobility Goal 1 (PT)    Activity/Assistive Device (Bed Mobility Goal 1, PT)  bed mobility activities, all  -SS     [place] Level/Cues Needed (Bed Mobility Goal 1, PT)  minimum assist (75% or more patient effort)  -SS     Time Frame (Bed Mobility Goal 1, PT)  long term goal (LTG);2 weeks  -     Row Name 06/01/20 1449          Transfer Goal 1 (PT)    Activity/Assistive Device (Transfer Goal 1, PT)  transfers, all  -SS     [place] Level/Cues Needed (Transfer Goal 1, PT)  minimum assist (75% or more patient effort)  -SS     Time Frame (Transfer Goal 1, PT)  long term goal (LTG);2 weeks  -     Row Name 06/01/20 1447          Gait Training Goal 1 (PT)    Activity/Assistive Device (Gait Training Goal 1, PT)  gait (walking locomotion) hand held assist  -SS     [place] Level (Gait Training Goal 1, PT)  minimum assist (75% or more patient effort)  -SS     Distance (Gait Goal 1, PT)  50'  -SS     Time Frame (Gait Training Goal 1, PT)  long term goal (LTG);2 weeks  -       User Key  (r) = Recorded By, (t) = Taken By, (c) = Cosigned By    Initials Name Provider Type     Loren Mendoza PT Physical Therapist        Clinical Impression     Row Name 06/01/20 1442          Pain Assessment    Additional Documentation  Pain Scale: FACES Pre/Post-Treatment (Group)  -     Row Name 06/01/20 144          Pain Scale: FACES Pre/Post-Treatment    Pain: FACES Scale, Pretreatment  0-->no hurt  -SS     Pain: FACES Scale, Post-Treatment  0-->no hurt  -SS     Row Name 06/01/20 144          Plan of Care Review    Plan of Care Reviewed With  patient  -SS     Outcome Summary  79 y/o F who presented with generalized weakness for 3 days and worsening AMS. PMH Alzheimers dementia. Recent admission for UTI. Patient diagbosed with electrolyte  imbalance, dehydration, poss infection, failure to thrive. Patient gets max A with all functional activities, mobility and ADLs from her . He prefers to take her home and is not interested in rehab. This date she requires mod A for bed mobility, mod A of 2 for transfers, mod A of 2 for 20' ambulation. Pt assisted in being cleaned up and was able to have large BM on BSC. She will benefit from continuing her HHPT once d/c'ed. PPE: mask, faceshield, gloves  -SS     Row Name 06/01/20 1445          Physical Therapy Clinical Impression    Criteria for Skilled Interventions Met (PT Clinical Impression)  yes;treatment indicated  -SS     Rehab Potential (PT Clinical Summary)  good, to achieve stated therapy goals  -SS     Predicted Duration of Therapy (PT)  until d/c  -SS     Row Name 06/01/20 1445          Positioning and Restraints    Pre-Treatment Position  in bed  -SS     Post Treatment Position  chair  -SS     In Chair  exit alarm on;with family/caregiver  -SS       User Key  (r) = Recorded By, (t) = Taken By, (c) = Cosigned By    Initials Name Provider Type    Loren Hopkins, PT Physical Therapist        Outcome Measures    No documentation.       Physical Therapy Education                 Title: PT OT SLP Therapies (Done)     Topic: Physical Therapy (Done)     Point: Mobility training (Done)     Description:   Instruct learner(s) on safety and technique for assisting patient out of bed, chair or wheelchair.  Instruct in the proper use of assistive devices, such as walker, crutches, cane or brace.              Patient Friendly Description:   It's important to get you on your feet again, but we need to do so in a way that is safe for you. Falling has serious consequences, and your personal safety is the most important thing of all.        When it's time to get out of bed, one of us or a family member will sit next to you on the bed to give you support.     If your doctor or nurse tells you to use a  walker, crutches, a cane, or a brace, be sure you use it every time you get out of bed, even if you think you don't need it.    Learning Progress Summary           Patient Acceptance, SKIP, VU by  at 6/1/2020 1450                               User Key     Initials Effective Dates Name Provider Type [facility] 06/19/19 -  Loren Mendoza, PT Physical Therapist PT              PT Recommendation and Plan  Planned Therapy Interventions (PT Eval): balance training, bed mobility training, gait training, home exercise program, patient/family education, stair training, strengthening, transfer training  Outcome Summary/Treatment Plan (PT)  Anticipated Discharge Disposition (PT): home with home health, home with 24/7 care  Plan of Care Reviewed With: patient  Outcome Summary: 79 y/o F who presented with generalized weakness for 3 days and worsening AMS. PMH Alzheimers dementia. Recent admission for UTI. Patient diagbosed with electrolyte imbalance, dehydration, poss infection, failure to thrive. Patient gets max A with all functional activities, mobility and ADLs from her . He prefers to take her home and is not interested in rehab. This date she requires mod A for bed mobility, mod A of 2 for transfers, mod A of 2 for 20' ambulation. Pt assisted in being cleaned up and was able to have large BM on BSC. She will benefit from continuing her HHPT once d/c'ed. PPE: mask, faceshield, gloves     Time Calculation:   PT Charges     Row Name 06/01/20 1451             Time Calculation    Start Time  1330  -      Stop Time  1418  -      Time Calculation (min)  48 min  -      PT Received On  06/01/20  -      PT - Next Appointment  06/02/20  -      PT Goal Re-Cert Due Date  06/15/20  -         Time Calculation- PT    Total Timed Code Minutes- PT  20 minute(s)  -        User Key  (r) = Recorded By, (t) = Taken By, (c) = Cosigned By    Initials Name Provider Type     Loren Mendoza, PT Physical  Therapist        Therapy Charges for Today     Code Description Service Date Service Provider Modifiers Qty    53225813583  PT EVAL MOD COMPLEXITY 4 6/1/2020 Loren Mendoza, PT GP 1    32193553615 HC PT THERAPEUTIC ACT EA 15 MIN 6/1/2020 Loren Mendoza, PT GP 1    [id number]  PT SELF CARE/MGMT/TRAIN EA 15 MIN 6/1/2020 Loren Mendoza, PT GP 1               Loren Mendoza, PT  6/1/2020

## 2020-06-01 NOTE — PLAN OF CARE
Problem: Patient Care Overview  Goal: Plan of Care Review  Outcome: Ongoing (interventions implemented as appropriate)  Flowsheets (Taken 6/1/2020 1136)  Plan of Care Reviewed With: patient  Outcome Summary: Patient seen for clinical swallow evaluation with trials of water by straw, applesauce, peaches and cracker.  Prolonged mastication over a minute for single bite of cracker noted.  Slow but functional mastication for peaches.  Once swallow was initiated there was no oral residue.  Timely swallow initiation for sips of water by straw.  No overt s/s of aspiration noted.  Recommend a mechanical soft diet and thin liquids.

## 2020-06-01 NOTE — PROGRESS NOTES
Discharge Planning Assessment   Denis     Patient Name: Taylor Price  MRN: [id number]  Today's Date: 6/1/2020    Admit Date: 5/31/2020    Discharge Needs Assessment     Row Name 06/01/20 1112       Living Environment    Lives With  spouse    Current Living Arrangements  home/apartment/condo    Potentially Unsafe Housing Conditions  unable to assess    Primary Care Provided by  spouse/significant other    Provides Primary Care For  no one, unable/limited ability to care for self    Family Caregiver if Needed  spouse    Quality of Family Relationships  supportive    Able to Return to Prior Arrangements  yes spouse states that patient is to return home with home health       Resource/Environmental Concerns    Resource/Environmental Concerns  none    Transportation Concerns  car, none       Transition Planning    Patient/Family Anticipates Transition to  home with help/services    Patient/Family Anticipated Services at Transition  home health care    Transportation Anticipated  family or friend will provide spouse takes patient to md appointments       Discharge Needs Assessment    Readmission Within the Last 30 Days  previous discharge plan unsuccessful    Equipment Currently Used at Home  walker, rolling;cane, quad    Provided Post Acute Provider List?  N/A    N/A Provider List Comment  [facility] and spouse wants services resumed when patient returns home        Discharge Plan     Row Name 06/01/20 1119       Plan    Plan  home with [facility]    Patient/Family in Agreement with Plan  yes    Plan Comments  per spouse patient is from home with [facility]; he states he takes care of her; states she has a walker and cane; states that rehab if recommended will not be used and patient to return home with [facility]         Functional Status    Usual Activity Tolerance  poor    Current Activity Tolerance  poor       Functional Status, IADL    Medications  completely  dependent spouse helps patient with eating and bathing    Meal Preparation  completely dependent       Carol naegele rn  Case management  Office number [phone]  Cell phone [phone]

## 2020-06-01 NOTE — PLAN OF CARE
Problem: Patient Care Overview  Goal: Plan of Care Review  Outcome: Ongoing (interventions implemented as appropriate)  Flowsheets (Taken 6/1/2020 4491)  Outcome Summary: Pt is 81 yo female presenting with generalized weakness for 3 days and worsening AMS. PMH Alzheimers dementia. Recent admission for UTI. Patient diagnosed with electrolyte imbalance, dehydration, poss infection, failure to thrive. At baseline, pt lives with spouse and does not utilize AE for ambulation. However, pt requires Max A for most ADLs due to cognitive deficits and difficulty with initiation. Spouse states [facility] PT has been working with pt since last hospitalization. Patient severely limited with cognitive impairment, with pt spouse stating this is well below baseline. Pt requires Max A for self-feeding, and demos significant delay with initation. She stands with Max A, though it appears pt does not truly understand what therapist is requesting. Pt unable to state children's names or location. States her name, and recognzes spouse but unable to state relationship. Perseverative on chewing, impeding self-feeding tasks. Patient requiring Max A for ADLs at home.  Appears patient would qualify for SNF/IP rehab, but questionable learning/carryover. Patient spouse states he prefers to take patient home and continue HHC services. Rec OT HHC at  MT. PPE: gloves, mask with face shield

## 2020-06-01 NOTE — PLAN OF CARE
Problem: Patient Care Overview  Goal: Plan of Care Review  Outcome: Ongoing (interventions implemented as appropriate)  Flowsheets (Taken 6/1/2020 1721)  Progress: no change  Plan of Care Reviewed With: patient  Outcome Summary: pt alert to self only; voiced no concerns this shift; pt worked with PT/OT; had an extra large BM; ; sat up in chair for an hour or so with spouse at bedside; remains on IV fluids; will continue to monitor

## 2020-06-02 LAB
ANION GAP SERPL CALCULATED.3IONS-SCNC: 11 MMOL/L (ref 5–15)
BASOPHILS # BLD AUTO: 0 10*3/MM3 (ref 0–0.2)
BASOPHILS NFR BLD AUTO: 0.4 % (ref 0–1.5)
BUN BLD-MCNC: 17 MG/DL (ref 8–23)
BUN/CREAT SERPL: 34.7 (ref 7–25)
CALCIUM SPEC-SCNC: 9.3 MG/DL (ref 8.6–10.5)
CHLORIDE SERPL-SCNC: 109 MMOL/L (ref 98–107)
CO2 SERPL-SCNC: 23 MMOL/L (ref 22–29)
CREAT BLD-MCNC: 0.49 MG/DL (ref 0.57–1)
DEPRECATED RDW RBC AUTO: 49 FL (ref 37–54)
EOSINOPHIL # BLD AUTO: 0.2 10*3/MM3 (ref 0–0.4)
EOSINOPHIL NFR BLD AUTO: 2.7 % (ref 0.3–6.2)
ERYTHROCYTE [DISTWIDTH] IN BLOOD BY AUTOMATED COUNT: 15.1 % (ref 12.3–15.4)
GFR SERPL CREATININE-BSD FRML MDRD: 122 ML/MIN/1.73
GLUCOSE BLD-MCNC: 122 MG/DL (ref 65–99)
HCT VFR BLD AUTO: 40.6 % (ref 34–46.6)
HGB BLD-MCNC: 13.3 G/DL (ref 12–15.9)
LYMPHOCYTES # BLD AUTO: 2.1 10*3/MM3 (ref 0.7–3.1)
LYMPHOCYTES NFR BLD AUTO: 27.9 % (ref 19.6–45.3)
MCH RBC QN AUTO: 30.3 PG (ref 26.6–33)
MCHC RBC AUTO-ENTMCNC: 32.8 G/DL (ref 31.5–35.7)
MCV RBC AUTO: 92.2 FL (ref 79–97)
MONOCYTES # BLD AUTO: 0.4 10*3/MM3 (ref 0.1–0.9)
MONOCYTES NFR BLD AUTO: 5 % (ref 5–12)
NEUTROPHILS # BLD AUTO: 4.8 10*3/MM3 (ref 1.7–7)
NEUTROPHILS NFR BLD AUTO: 64 % (ref 42.7–76)
NRBC BLD AUTO-RTO: 0 /100 WBC (ref 0–0.2)
PLATELET # BLD AUTO: 237 10*3/MM3 (ref 140–450)
PMV BLD AUTO: 9.4 FL (ref 6–12)
POTASSIUM BLD-SCNC: 3.7 MMOL/L (ref 3.5–5.2)
RBC # BLD AUTO: 4.41 10*6/MM3 (ref 3.77–5.28)
SODIUM BLD-SCNC: 143 MMOL/L (ref 136–145)
WBC NRBC COR # BLD: 7.5 10*3/MM3 (ref 3.4–10.8)

## 2020-06-02 PROCEDURE — 97530 THERAPEUTIC ACTIVITIES: CPT

## 2020-06-02 PROCEDURE — 92526 ORAL FUNCTION THERAPY: CPT

## 2020-06-02 PROCEDURE — 80048 BASIC METABOLIC PNL TOTAL CA: CPT | Performed by: INTERNAL MEDICINE

## 2020-06-02 PROCEDURE — G0378 HOSPITAL OBSERVATION PER HR: HCPCS

## 2020-06-02 PROCEDURE — 97535 SELF CARE MNGMENT TRAINING: CPT

## 2020-06-02 PROCEDURE — 97116 GAIT TRAINING THERAPY: CPT

## 2020-06-02 PROCEDURE — 85025 COMPLETE CBC W/AUTO DIFF WBC: CPT | Performed by: INTERNAL MEDICINE

## 2020-06-02 PROCEDURE — 99225 PR SBSQ OBSERVATION CARE/DAY 25 MINUTES: CPT | Performed by: INTERNAL MEDICINE

## 2020-06-02 RX ADMIN — POLYETHYLENE GLYCOL 3350 17 G: 17 POWDER, FOR SOLUTION ORAL at 09:02

## 2020-06-02 RX ADMIN — Medication 10 ML: at 09:06

## 2020-06-02 RX ADMIN — ASPIRIN 81 MG 81 MG: 81 TABLET ORAL at 09:02

## 2020-06-02 RX ADMIN — AMLODIPINE BESYLATE 10 MG: 5 TABLET ORAL at 09:02

## 2020-06-02 RX ADMIN — PANTOPRAZOLE SODIUM 40 MG: 40 TABLET, DELAYED RELEASE ORAL at 09:02

## 2020-06-02 RX ADMIN — CITALOPRAM HYDROBROMIDE 20 MG: 20 TABLET ORAL at 09:02

## 2020-06-02 RX ADMIN — DONEPEZIL HYDROCHLORIDE 10 MG: 5 TABLET, FILM COATED ORAL at 20:54

## 2020-06-02 RX ADMIN — ATORVASTATIN CALCIUM 20 MG: 20 TABLET, FILM COATED ORAL at 09:02

## 2020-06-02 RX ADMIN — MELOXICAM 15 MG: 15 TABLET ORAL at 09:02

## 2020-06-02 RX ADMIN — HYDROCORTISONE ACETATE 25 MG: 25 SUPPOSITORY RECTAL at 09:02

## 2020-06-02 RX ADMIN — HYDROCORTISONE ACETATE 25 MG: 25 SUPPOSITORY RECTAL at 20:54

## 2020-06-02 RX ADMIN — SODIUM CHLORIDE, SODIUM LACTATE, POTASSIUM CHLORIDE, AND CALCIUM CHLORIDE 75 ML/HR: 600; 310; 30; 20 INJECTION, SOLUTION INTRAVENOUS at 05:33

## 2020-06-02 RX ADMIN — Medication 10 ML: at 20:54

## 2020-06-02 RX ADMIN — OYSTER SHELL CALCIUM WITH VITAMIN D 1 TABLET: 500; 200 TABLET, FILM COATED ORAL at 09:02

## 2020-06-02 NOTE — THERAPY TREATMENT NOTE
Acute Care - Occupational Therapy Treatment Note   Denis     Patient Name: Taylor Price  : 1940  MRN: [id number]  Today's Date: 2020             Admit Date: 2020       ICD-10-CM ICD-9-CM   1. Weakness R53.1 780.79   2. Hypokalemia E87.6 276.8     Patient Active Problem List   Diagnosis   • Nondisplaced zone i fracture of sacrum, initial encounter for closed fracture (CMS/[facility])   • Hyperlipidemia   • Hypertension   • Dementia of the Alzheimer's type with late onset without behavioral disturbance (CMS/[facility])   • Osteoarthritis   • Depression   • Age-related physical debility   • Delirium   • GERD (gastroesophageal reflux disease)   • Alzheimer disease (CMS/HCC)   • Weakness   • Hypernatremia   • Malnutrition (CMS/HCC)   • Hypokalemia   • UTI (urinary tract infection), bacterial   • Abdominal pain     Past Medical History:   Diagnosis Date   • Alzheimer disease (CMS/HCC)    • Arthritis    • Depression    • GERD (gastroesophageal reflux disease)    • Hypertension      Past Surgical History:   Procedure Laterality Date   • HYSTERECTOMY         Therapy Treatment    Rehabilitation Treatment Summary     Row Name 20             Treatment Time/Intention    Discipline  occupational therapist  -SR      Document Type  therapy note (daily note)  -SR      Recorded by [SR] Debby Burgos OT 20 1030      Row Name 20             Cognitive Assessment/Intervention- PT/OT    Cognitive Assessment/Intervention Comment  Pt unable to answer any questions this date.  Unable to follow verbal commands, though improves with tactile cues.   -SR      Recorded by [SR] Debby Burgos OT 20 1030      Row Name 20             Transfer Assessment/Treatment    Transfer Assessment/Treatment  sit-stand transfer  -SR      Comment (Transfers)  First two attempts pt resisted therapist assisting her to stand and transfer her to [facility].  She improved with transfer when provided with  therapists hand to stand and pivot to chair.      -SR      Recorded by [SR] Debby Burgos OT 06/02/20 1030      Row Name 06/02/20 0901             Bed-Chair Transfer    Bed-Chair Jewell (Transfers)  moderate assist (50% patient effort)  -SR      Recorded by [SR] Debby Burgos, OT 06/02/20 1030      Row Name 06/02/20 0901             ADL Assessment/Intervention    BADL Assessment/Intervention  toileting  -SR      Recorded by [SR] Debby Burgos, OT 06/02/20 1030      Row Name 06/02/20 0901             Toileting Assessment/Training    Jewell Level (Toileting)  dependent (less than 25% patient effort)  -SR      Recorded by [SR] Debby Burgos, OT 06/02/20 1030      Row Name 06/02/20 0901             Static Sitting Balance    Level of Jewell (Unsupported Sitting, Static Balance)  supervision  -SR      Recorded by [SR] Debby Burgos OT 06/02/20 1030      Row Name 06/02/20 0901             Static Standing Balance    Level of Jewell (Supported Standing, Static Balance)  moderate assist, 50 to 74% patient effort;maximal assist, 25 to 49% patient effort;2 person assist  -SR      Recorded by [SR] Debby Burgos OT 06/02/20 1030      Row Name 06/02/20 0901             Positioning and Restraints    Pre-Treatment Position  sitting in chair/recliner  -SR      Post Treatment Position  chair  -SR      In Chair  call light within reach;encouraged to call for assist;exit alarm on;with nsg  -SR      Recorded by [SR] Debby Burgos, OT 06/02/20 1030      Row Name 06/02/20 0901             Pain Scale: FACES Pre/Post-Treatment    Pain: FACES Scale, Pretreatment  0-->no hurt  -SR      Pain: FACES Scale, Post-Treatment  0-->no hurt  -SR      Recorded by [SR] Debby Burgos OT 06/02/20 1030      Row Name 06/02/20 0901             Plan of Care Review    Outcome Summary  Pt continues to have difficulty following therapists commands, though improves  with tactile cues. She requires mod-max assist for transfer to chair.  Appears to be near baseline level of function and from home with 24 hour assist from family.  Recommend HH therapy with return to family at discharge. PPE: mask, shield, gloves.    -SR      Recorded by [SR] Debby Burgos OT 06/02/20 1030      Row Name 06/02/20 0901             Outcome Summary/Treatment Plan (OT)    Daily Summary of Progress (OT)  progress toward functional goals is gradual  -SR      Anticipated Discharge Disposition (OT)  home with home health  -SR      Recorded by [SR] Debby Burgos OT 06/02/20 1030        User Key  (r) = Recorded By, (t) = Taken By, (c) = Cosigned By    Initials Name Effective Dates Discipline    SR Debby Burgos OT 03/01/19 -  OT             Occupational Therapy Education                 Title: PT OT SLP Therapies (In Progress)     Topic: Occupational Therapy (In Progress)     Point: ADL training (In Progress)     Description:   Instruct learner(s) on proper safety adaptation and remediation techniques during self care or transfers.   Instruct in proper use of assistive devices.              Learning Progress Summary           Patient Acceptance, E,TB, NR by SR at 6/2/2020 1031    Acceptance, E, NR,NL by ES at 6/1/2020 1514   Family Acceptance, E, NR,NL by ES at 6/1/2020 1514                   Point: Home exercise program (Not Started)     Description:   Instruct learner(s) on appropriate technique for monitoring, assisting and/or progressing therapeutic exercises/activities.              Learner Progress:   Not documented in this visit.          Point: Precautions (Not Started)     Description:   Instruct learner(s) on prescribed precautions during self-care and functional transfers.              Learner Progress:   Not documented in this visit.          Point: Body mechanics (Not Started)     Description:   Instruct learner(s) on proper positioning and spine alignment during  self-care, functional mobility activities and/or exercises.              Learner Progress:   Not documented in this visit.                      User Key     Initials Effective Dates Name Provider Type Discipline    SR 03/01/19 -  Debby Burgos OT Occupational Therapist OT    ES 03/01/19 -  Katy Jackson OT Occupational Therapist OT                OT Recommendation and Plan  Outcome Summary/Treatment Plan (OT)  Daily Summary of Progress (OT): progress toward functional goals is gradual  Anticipated Discharge Disposition (OT): home with home health  Daily Summary of Progress (OT): progress toward functional goals is gradual  Outcome Summary: Pt continues to have difficulty following therapists commands, though improves with tactile cues. She requires mod-max assist for transfer to chair.  Appears to be near baseline level of function and from home with 24 hour assist from family.  Recommend HH therapy with return to family at discharge. PPE: mask, shield, gloves.         Time Calculation:   Time Calculation- OT     Row Name 06/02/20 1031             Time Calculation- OT    OT Start Time  0901  -SR      OT Stop Time  0915  -SR      OT Time Calculation (min)  14 min  -SR      Total Timed Code Minutes- OT  14 minute(s)  -SR      OT Received On  06/02/20  -SR      OT - Next Appointment  06/03/20  -SR        User Key  (r) = Recorded By, (t) = Taken By, (c) = Cosigned By    Initials Name Provider Type    SR Debby Burgos OT Occupational Therapist        Therapy Charges for Today     Code Description Service Date Service Provider Modifiers Qty    85841631128 HC OT SELF CARE/MGMT/TRAIN EA 15 MIN 6/2/2020 Debby Burgos OT GO 1               Debby Burgos OT  6/2/2020

## 2020-06-02 NOTE — PLAN OF CARE
Problem: Patient Care Overview  Goal: Plan of Care Review  Outcome: Ongoing (interventions implemented as appropriate)  Note:   Pt remains confused overnight. Pt's potassium increased, pt's CXR was normal. Per spouse - pt to return home with home health care. Will continue to monitor pt

## 2020-06-02 NOTE — PLAN OF CARE
Problem: Patient Care Overview  Goal: Plan of Care Review  Outcome: Ongoing (interventions implemented as appropriate)  Flowsheets (Taken 6/2/2020 0258)  Outcome Summary: Patient able to significantly improve gait distance this date to 200' with hand held assist. Still required A of 2 for safety due to poor command following and poor predictability leading to high fall risk. patient will benefit from HHPT at d/c.  prefers to have pt return home with him. PPE: Mask, face sheild, gloves.

## 2020-06-02 NOTE — THERAPY TREATMENT NOTE
Acute Care - Speech Language Pathology   Swallow Treatment Note  Denis     Patient Name: Taylor Price  : 1940  MRN: [id number]  Today's Date: 2020               Admit Date: 2020    Visit Dx:      ICD-10-CM ICD-9-CM   1. Weakness R53.1 780.79   2. Hypokalemia E87.6 276.8     Patient Active Problem List   Diagnosis   • Nondisplaced zone i fracture of sacrum, initial encounter for closed fracture (CMS/[facility])   • Hyperlipidemia   • Hypertension   • Dementia of the Alzheimer's type with late onset without behavioral disturbance (CMS/[facility])   • Osteoarthritis   • Depression   • Age-related physical debility   • Delirium   • GERD (gastroesophageal reflux disease)   • Alzheimer disease (CMS/[facility])   • Weakness   • Hypernatremia   • Malnutrition (CMS/[facility])   • Hypokalemia   • UTI (urinary tract infection), bacterial   • Abdominal pain       Therapy Treatment  Rehabilitation Treatment Summary     Row Name 20 1300          Discipline  speech language pathologist  -MC    Document Type  therapy note (daily note)  -    Comment  PPE donned: gloves, surgical mask, face shield. Skilled ST targeting dysphagia this date during pt's lunch meal. Pt seen sitting upright in chair with tray in front of her.  present for second half of session.   -MC    Recorded by [NATALY] Adelina Medina SLP 20 1322      User Key  (r) = Recorded By, (t) = Taken By, (c) = Cosigned By    Initials Name Effective Dates Discipline     Adelina Medina SLP 19 -  SLP          SLP GOALS     Row Name 20 1300          Oral Nutrition/Hydration Goal 1, SLP  Patient will be seen at a meal within 24-48 hours to assess tolerance of current diet with further recommendations to be given as indicated  -    Barriers (Oral Nutrition/Hydration Goal 1, SLP)  Pt with significant distraction and reduced attention consistent with Alzheimer's. This was observed to serve as a barrier for pt ability to self feed in a sufficient & timely  manner to maintain nutrition. Pt required max cues & encouragement for PO intake.  She consumed tea  by straw, ground turkey, & mashed potatoes as fed by SLP.     -          Oral Nutrition/Hydration Goal 2, SLP  Patient will tolerate safest and least restrictive diet with no complications from aspiration  -    Barriers (Oral Nutrition/Hydration Goal 2, SLP)  Oral dysphagia evidenced with ground meat & to a lesser extent puree. Pt demonstrates pocketing, latency & interjections in oral manipulation/mastication. Pt clears oral cavity of mashed potatoes independently following a prolonged period of time & encouragement. Pt required multiple prompted liquid washes to clear visible oral cavity of stasis however it should be noted that visualization of oral cavity was limited. At this time, recommend pt's diet downgraded to puree, continue thin liquids. Pt should be a full feed with liquid washes & oral cavity inspection in between bites to ensure oral cavity clearance. Diet changes & feeding precautions were duscussed with the pt's significant other & nursing aid. Unable to locate pt's nurse, NA to relay information.   -      User Key  (r) = Recorded By, (t) = Taken By, (c) = Cosigned By    Initials Name Provider Type    Adelina Silva, SLP Speech and Language Pathologist          EDUCATION  The patient has been educated in the following areas:   Modified Diet Instruction.  ST goals/POC   Small bites/sips/bolus size   Level of feeding assistance/supervision required: full feed  Safe swallow positioning        SLP Recommendation and Plan          diet downgraded to puree, continue thin liquids.   Pt should be a full feed with liquid washes & oral cavity inspection in between bites to ensure oral cavity clearance.   Diet changes & feeding precautions were duscussed with the pt's significant other & nursing aid. Unable to locate pt's nurse, NA to relay information.             Adelina Medina, PHILL  6/2/2020

## 2020-06-02 NOTE — THERAPY TREATMENT NOTE
Patient Name: Taylor Price  : 1940    MRN: [id number]                              Today's Date: 2020       Admit Date: 2020    Visit Dx:     ICD-10-CM ICD-9-CM   1. Weakness R53.1 780.79   2. Hypokalemia E87.6 276.8     Patient Active Problem List   Diagnosis   • Nondisplaced zone i fracture of sacrum, initial encounter for closed fracture (CMS/[facility])   • Hyperlipidemia   • Hypertension   • Dementia of the Alzheimer's type with late onset without behavioral disturbance (CMS/[facility])   • Osteoarthritis   • Depression   • Age-related physical debility   • Delirium   • GERD (gastroesophageal reflux disease)   • Alzheimer disease (CMS/HCC)   • Weakness   • Hypernatremia   • Malnutrition (CMS/HCC)   • Hypokalemia   • UTI (urinary tract infection), bacterial   • Abdominal pain     Past Medical History:   Diagnosis Date   • Alzheimer disease (CMS/HCC)    • Arthritis    • Depression    • GERD (gastroesophageal reflux disease)    • Hypertension      Past Surgical History:   Procedure Laterality Date   • HYSTERECTOMY       General Information     Row Name 20 1447          PT Evaluation Time/Intention    Document Type  therapy note (daily note)  -     Mode of Treatment  physical therapy  -     Row Name 20 1447          General Information    Existing Precautions/Restrictions  fall  -     Barriers to Rehab  cognitive status  -     Row Name 20 1447          Cognitive Assessment/Intervention- PT/OT    Orientation Status (Cognition)  oriented to;person;disoriented to;place;time;situation  -     Row Name 20 1447          Safety Issues, Functional Mobility    Impairments Affecting Function (Mobility)  endurance/activity tolerance;cognition;balance;postural/trunk control;strength  -       User Key  (r) = Recorded By, (t) = Taken By, (c) = Cosigned By    Initials Name Provider Type    SS Loren Mendoza, PT Physical Therapist        Mobility     Row Name 20 1448          Bed  Mobility Assessment/Treatment    Supine-Sit Dickson (Bed Mobility)  moderate assist (50% patient effort)  -SS     Assistive Device (Bed Mobility)  head of bed elevated  -SS     Comment (Bed Mobility)  patient in bed upon arrival and requires mod A and max cues to acheive sitting EOB. Pt very plesant and agreeable but exhibits difficulty with sequencing.   -     Row Name 06/02/20 1448          Transfer Assessment/Treatment    Comment (Transfers)  patient responds well to hand held assist as this is what her  uses at home. One person hand held assist and one person at gait belt and assisting with angela parra.   -     Row Name 06/02/20 1448          Sit-Stand Transfer    Sit-Stand Dickson (Transfers)  moderate assist (50% patient effort)  -     Row Name 06/02/20 1448          Gait/Stairs Assessment/Training    Dickson Level (Gait)  moderate assist (50% patient effort);2 person assist  -SS     Distance in Feet (Gait)  200'  -SS     Deviations/Abnormal Patterns (Gait)  base of support, narrow;ataxic  -SS     Bilateral Gait Deviations  forward flexed posture  -SS     Comment (Gait/Stairs)  patient able to significantly progress gait distance with bilateral hand held assist as her  uses at home. patient distractable and often performs head turns which leads to lateral loss of balance from which she requires assist to recover. requires max cues for upright posture and attention to task.   -       User Key  (r) = Recorded By, (t) = Taken By, (c) = Cosigned By    Initials Name Provider Type     Loren Mendoza, PT Physical Therapist        Obj/Interventions     Row Name 06/02/20 1452          Static Sitting Balance    Level of Dickson (Unsupported Sitting, Static Balance)  contact guard assist  -SS     Sitting Position (Unsupported Sitting, Static Balance)  sitting on edge of bed  -     Time Able to Maintain Position (Unsupported Sitting, Static Balance)  1 to 2 minutes  -      [facility] Name 06/02/20 1452          Static Standing Balance    Level of Baldwin (Supported Standing, Static Balance)  minimal assist, 75% patient effort;moderate assist, 50 to 74% patient effort  -SS     Time Able to Maintain Position (Supported Standing, Static Balance)  1 to 2 minutes  -[facility] Name 06/02/20 1452          Dynamic Standing Balance    Level of Baldwin, Reaches Outside Midline (Standing, Dynamic Balance)  moderate assist, 50 to 74% patient effort;2 person assist  -SS     Time Able to Maintain Position, Reaches Outside Midline (Standing, Dynamic Balance)  3 to 4 minutes  -       User Key  (r) = Recorded By, (t) = Taken By, (c) = Cosigned By    Initials Name Provider Type    SS Loren Mendoza PT Physical Therapist        Goals/Plan    No documentation.       Clinical Impression     Row Name 06/02/20 1453          Pain Scale: FACES Pre/Post-Treatment    Pain: FACES Scale, Pretreatment  0-->no hurt  -SS     Pain: FACES Scale, Post-Treatment  0-->no hurt  -SS     Row Name 06/02/20 1453          Plan of Care Review    Plan of Care Reviewed With  patient  -SS     Outcome Summary  Patient able to significantly improve gait distance this date to 200' with hand held assist. Still required A of 2 for safety due to poor command following and poor predictability leading to high fall risk. patient will benefit from HHPT at d/c.  prefers to have pt return home with him. PPE: Mask, face sheild, gloves.   -[facility] Name 06/02/20 1453          Physical Therapy Clinical Impression    Criteria for Skilled Interventions Met (PT Clinical Impression)  yes;treatment indicated  -SS     Row Name 06/02/20 1453          Positioning and Restraints    Post Treatment Position  chair  -SS     In Chair  sitting;exit alarm on;call light within reach  -       User Key  (r) = Recorded By, (t) = Taken By, (c) = Cosigned By    Initials Name Provider Type    SS Loren Mendoza PT Physical Therapist         Outcome Measures    No documentation.       Physical Therapy Education                 Title: PT OT SLP Therapies (In Progress)     Topic: Physical Therapy (Done)     Point: Mobility training (Done)     Description:   Instruct learner(s) on safety and technique for assisting patient out of bed, chair or wheelchair.  Instruct in the proper use of assistive devices, such as walker, crutches, cane or brace.              Patient Friendly Description:   It's important to get you on your feet again, but we need to do so in a way that is safe for you. Falling has serious consequences, and your personal safety is the most important thing of all.        When it's time to get out of bed, one of us or a family member will sit next to you on the bed to give you support.     If your doctor or nurse tells you to use a walker, crutches, a cane, or a brace, be sure you use it every time you get out of bed, even if you think you don't need it.    Learning Progress Summary           Patient Acceptance, E, VU by  at 6/2/2020 1455    Acceptance, E, VU by  at 6/1/2020 1450                               User Key     Initials Effective Dates Name Provider Type Discipline     06/19/19 -  Loren Mendoza, PT Physical Therapist PT              PT Recommendation and Plan  Planned Therapy Interventions (PT Eval): balance training, bed mobility training, gait training, home exercise program, patient/family education, stair training, strengthening, transfer training  Outcome Summary/Treatment Plan (PT)  Anticipated Discharge Disposition (PT): home with home health, home with 24/7 care  Plan of Care Reviewed With: patient  Outcome Summary: Patient able to significantly improve gait distance this date to 200' with hand held assist. Still required A of 2 for safety due to poor command following and poor predictability leading to high fall risk. patient will benefit from HHPT at d/c.  prefers to have pt return home with him.  PPE: Mask, face sheild, gloves.      Time Calculation:   PT Charges     Row Name 06/02/20 1455             Time Calculation    Start Time  1142  -      Stop Time  1203  -      Time Calculation (min)  21 min  -SS      PT Received On  06/02/20  -      PT - Next Appointment  06/03/20  -         Time Calculation- PT    Total Timed Code Minutes- PT  21 minute(s)  -        User Key  (r) = Recorded By, (t) = Taken By, (c) = Cosigned By    Initials Name Provider Type     Loren Mendoza, PT Physical Therapist        Therapy Charges for Today     Code Description Service Date Service Provider Modifiers Qty    67291835313 HC PT EVAL MOD COMPLEXITY 4 6/1/2020 Loren Mendoza, PT GP 1    82854655032 HC PT THERAPEUTIC ACT EA 15 MIN 6/1/2020 Loren Mendoza, PT GP 1    [id number]  PT SELF CARE/MGMT/TRAIN EA 15 MIN 6/1/2020 Loren Mendoza, PT GP 1    77155738073 HC GAIT TRAINING EA 15 MIN 6/2/2020 Loren Mendoza, PT GP 1    12386307277 HC PT THERAPEUTIC ACT EA 15 MIN 6/2/2020 Loren Mendoza, PT GP 1               Loren Mendoza PT  6/2/2020

## 2020-06-02 NOTE — PLAN OF CARE
Problem: Patient Care Overview  Goal: Plan of Care Review  Outcome: Ongoing (interventions implemented as appropriate)      Significant oral dysphagia demonstrating risk of malnutrition due to pocketing & poor oral manipulation consistent with Alzheimer's.  Additionally, pt requires full feed.     At this time, recommend pt's diet downgraded to puree, continue thin liquids. Pt should be a full feed with liquid washes & oral cavity inspection in between bites to ensure oral cavity clearance. Diet changes & feeding precautions were duscussed with the pt's significant other & nursing aid. Unable to locate pt's nurseZAC to relay information.

## 2020-06-02 NOTE — PLAN OF CARE
Problem: Patient Care Overview  Goal: Plan of Care Review  Outcome: Ongoing (interventions implemented as appropriate)  Flowsheets (Taken 6/2/2020 1517)  Progress: no change  Outcome Summary: pt alert to self only; sat up in chair most of shift with spouse at bedside; pt still not eating, drinking very little of enusre; will continue to monitor

## 2020-06-03 VITALS
TEMPERATURE: 98.4 F | BODY MASS INDEX: 19.27 KG/M2 | DIASTOLIC BLOOD PRESSURE: 84 MMHG | HEART RATE: 63 BPM | HEIGHT: 62 IN | SYSTOLIC BLOOD PRESSURE: 143 MMHG | OXYGEN SATURATION: 99 % | WEIGHT: 104.72 LBS | RESPIRATION RATE: 16 BRPM

## 2020-06-03 PROBLEM — E87.6 HYPOKALEMIA: Status: RESOLVED | Noted: 2020-05-03 | Resolved: 2020-06-03

## 2020-06-03 PROBLEM — R10.9 ABDOMINAL PAIN: Status: RESOLVED | Noted: 2020-06-01 | Resolved: 2020-06-03

## 2020-06-03 LAB
ANION GAP SERPL CALCULATED.3IONS-SCNC: 11 MMOL/L (ref 5–15)
BUN BLD-MCNC: 15 MG/DL (ref 8–23)
BUN/CREAT SERPL: 28.8 (ref 7–25)
CALCIUM SPEC-SCNC: 8.6 MG/DL (ref 8.6–10.5)
CHLORIDE SERPL-SCNC: 107 MMOL/L (ref 98–107)
CO2 SERPL-SCNC: 20 MMOL/L (ref 22–29)
CREAT BLD-MCNC: 0.52 MG/DL (ref 0.57–1)
GFR SERPL CREATININE-BSD FRML MDRD: 113 ML/MIN/1.73
GLUCOSE BLD-MCNC: 85 MG/DL (ref 65–99)
POTASSIUM BLD-SCNC: 4.3 MMOL/L (ref 3.5–5.2)
SODIUM BLD-SCNC: 138 MMOL/L (ref 136–145)

## 2020-06-03 PROCEDURE — 80048 BASIC METABOLIC PNL TOTAL CA: CPT | Performed by: INTERNAL MEDICINE

## 2020-06-03 PROCEDURE — G0378 HOSPITAL OBSERVATION PER HR: HCPCS

## 2020-06-03 PROCEDURE — 99217 PR OBSERVATION CARE DISCHARGE MANAGEMENT: CPT | Performed by: INTERNAL MEDICINE

## 2020-06-03 PROCEDURE — 92526 ORAL FUNCTION THERAPY: CPT

## 2020-06-03 RX ORDER — CYANOCOBALAMIN 1000 UG/ML
1000 INJECTION, SOLUTION INTRAMUSCULAR; SUBCUTANEOUS
Qty: 1 ML | Refills: 0 | Status: SHIPPED | OUTPATIENT
Start: 2020-06-29 | End: 2020-07-27

## 2020-06-03 RX ADMIN — OYSTER SHELL CALCIUM WITH VITAMIN D 1 TABLET: 500; 200 TABLET, FILM COATED ORAL at 08:54

## 2020-06-03 RX ADMIN — AMLODIPINE BESYLATE 10 MG: 5 TABLET ORAL at 08:54

## 2020-06-03 RX ADMIN — POLYETHYLENE GLYCOL 3350 17 G: 17 POWDER, FOR SOLUTION ORAL at 08:54

## 2020-06-03 RX ADMIN — CITALOPRAM HYDROBROMIDE 20 MG: 20 TABLET ORAL at 08:54

## 2020-06-03 RX ADMIN — PANTOPRAZOLE SODIUM 40 MG: 40 TABLET, DELAYED RELEASE ORAL at 08:54

## 2020-06-03 RX ADMIN — ATORVASTATIN CALCIUM 20 MG: 20 TABLET, FILM COATED ORAL at 08:54

## 2020-06-03 RX ADMIN — HYDROCORTISONE ACETATE 25 MG: 25 SUPPOSITORY RECTAL at 08:54

## 2020-06-03 RX ADMIN — MELOXICAM 15 MG: 15 TABLET ORAL at 08:54

## 2020-06-03 RX ADMIN — ASPIRIN 81 MG 81 MG: 81 TABLET ORAL at 08:54

## 2020-06-03 NOTE — DISCHARGE SUMMARY
Date of Admission: 5/31/2020    Date of Discharge:  6/3/2020    Length of stay:  LOS: 0 days     Admission Diagnosis:   Hypokalemia [E87.6]  Weakness [R53.1]      Discharge Diagnosis:     Altered mental status with generalized weakness and decreased oral intake  - likely multifactorial   - Chest x-ray showed no active disease  - UA negative   - TSH 4.55  -  declined home health and inpatient rehab     B12 deficiency  - start monthly B12 injection      Abdominal pain likely d/t constipation and fecal impaction   - CT abd/pel reviewed   - mineral oil enema  - pt had a bowel movement 6/1/20, 6/2/2020  - continue miralax daily      Hypokalemia- resolved   - potassium replacement protocol   - magnesium 2.1      Alzheimer's dementia  - Reported baseline patient is more aware  - Continue Aricept     Severe Malnutrition   - Dietary consult  - BMI continues to decrease over the past 6 months  - encourage Boost BID      Essential hypertension, poorly controlled  - Continue home Norvasc     Depression  - Continue home Celexa     GERD  - Continue home PPI     Hyperlipidemia  - Continue home statin       Active Hospital Problems    Diagnosis  POA   • **Weakness [R53.1]  Yes   • Malnutrition (CMS/HCC) [E46]  Yes   • GERD (gastroesophageal reflux disease) [K21.9]  Yes   • Hyperlipidemia [E78.5]  Yes   • Dementia of the Alzheimer's type with late onset without behavioral disturbance (CMS/HCC) [G30.1, F02.80]  Yes   • Depression [F32.9]  Yes   • Hypertension [I10]  Yes      Resolved Hospital Problems    Diagnosis Date Resolved POA   • Abdominal pain [R10.9] 06/03/2020 Yes   • Hypokalemia [E87.6] 06/03/2020 Yes       Hospital Course:  Patient is a 80 y.o. female [facility] complaining of generalized weakness for the past 3 days.  It was reported by family that patient has had altered mental status that is worsened over the past few days and generally weak.  The  reports that this  happened about 3 weeks ago and she was found to have a UTI.  Patient is a poor historian and unable to answer questions or follow verbal commands. It was reported by the  that patient also has decreased appetite.     Patient was admitted to [facility] on 5/3/2020 for UTI, weakness, decreased oral intake and hypokalemia.  Patient diagnosed with debility, increased weakness with failure to thrive likely related to worsening Alzheimer's dementia.     Patient was hydrated, her potassium was replaced. She was able to have a bowel movement. She is tolerating diet. Encouraged to had two boosts per day and start miralax daily.     Procedures Performed:         Consults:   Consults     Date and Time Order Name Status Description    5/4/2020 1016 Inpatient Palliative Care MD Consult Completed     5/3/2020 2028 Hospitalist (on-call MD unless specified) Completed           Vital Signs:  Temp:  [97.6 °F (36.4 °C)-98.4 °F (36.9 °C)] 98.4 °F (36.9 °C)  Heart Rate:  [61-68] 63  Resp:  [14-16] 16  BP: (143-159)/(77-84) 143/84        Physical Exam:  Physical Exam   Constitutional: She appears well-developed. No distress.   HENT:   Head: Normocephalic and atraumatic.   Mouth/Throat: Oropharynx is clear and moist.   Cardiovascular: Normal rate and regular rhythm.   No murmur heard.  Pulmonary/Chest: Effort normal and breath sounds normal.   Abdominal: Soft. Bowel sounds are normal.   Neurological: She is alert.   Oriented to self only   Skin: Skin is warm and dry.   Psychiatric:   Demented    Vitals reviewed.          Pertinent Test Results:   Lab Results (last 72 hours)     Procedure Component Value Units Date/Time    Basic Metabolic Panel [[id number]]  (Abnormal) Collected:  06/03/20 0425    Specimen:  Blood Updated:  06/03/20 0538     Glucose 85 mg/dL      BUN 15 mg/dL      Creatinine 0.52 mg/dL      Sodium 138 mmol/L      Potassium 4.3 mmol/L      Chloride 107 mmol/L      CO2 20.0 mmol/L      Calcium 8.6 mg/dL       eGFR Non African Amer 113 mL/min/1.73      BUN/Creatinine Ratio 28.8     Anion Gap 11.0 mmol/L     Narrative:       GFR Normal >60  Chronic Kidney Disease <60  Kidney Failure <15      Basic Metabolic Panel [[id number]]  (Abnormal) Collected:  06/02/20 0828    Specimen:  Blood Updated:  06/02/20 1010     Glucose 122 mg/dL      BUN 17 mg/dL      Creatinine 0.49 mg/dL      Sodium 143 mmol/L      Potassium 3.7 mmol/L      Chloride 109 mmol/L      CO2 23.0 mmol/L      Calcium 9.3 mg/dL      eGFR Non African Amer 122 mL/min/1.73      BUN/Creatinine Ratio 34.7     Anion Gap 11.0 mmol/L     Narrative:       GFR Normal >60  Chronic Kidney Disease <60  Kidney Failure <15      CBC & Differential [[id number]] Collected:  06/02/20 0828    Specimen:  Blood Updated:  06/02/20 1006    Narrative:       The following orders were created for panel order CBC & Differential.  Procedure                               Abnormality         Status                     ---------                               -----------         ------                     CBC Auto Differential[[id number]]        Normal              Final result                 Please view results for these tests on the individual orders.    CBC Auto Differential [[id number]]  (Normal) Collected:  06/02/20 0828    Specimen:  Blood Updated:  06/02/20 1006     WBC 7.50 10*3/mm3      RBC 4.41 10*6/mm3      Hemoglobin 13.3 g/dL      Hematocrit 40.6 %      MCV 92.2 fL      MCH 30.3 pg      MCHC 32.8 g/dL      RDW 15.1 %      RDW-SD 49.0 fl      MPV 9.4 fL      Platelets 237 10*3/mm3      Neutrophil % 64.0 %      Lymphocyte % 27.9 %      Monocyte % 5.0 %      Eosinophil % 2.7 %      Basophil % 0.4 %      Neutrophils, Absolute 4.80 10*3/mm3      Lymphocytes, Absolute 2.10 10*3/mm3      Monocytes, Absolute 0.40 10*3/mm3      Eosinophils, Absolute 0.20 10*3/mm3      Basophils, Absolute 0.00 10*3/mm3      nRBC 0.0 /100 WBC     Potassium [[id number]]  (Normal) Collected:  06/01/20 1955     Specimen:  Blood Updated:  06/01/20 2100     Potassium 4.4 mmol/L     Hemoglobin A1c [[id number]]  (Normal) Collected:  06/01/20 0322    Specimen:  Blood Updated:  06/01/20 1113     Hemoglobin A1C 5.0 %     Narrative:       Hemoglobin A1C Reference Range:    <5.7 %        Normal  5.7-6.4 %     Increased risk for diabetes  > 6.4 %        Diabetes       These guidelines have been recommended by the American Diabetic Association for Hgb A1c.      The following 2010 guidelines have been recommended by the American Diabetes Association for Hemoglobin A1c.    HBA1c 5.7-6.4% Increased risk for future diabetes (pre-diabetes)  HBA1c     >6.4% Diabetes      Vitamin B12 [[id number]]  (Normal) Collected:  06/01/20 0322    Specimen:  Blood Updated:  06/01/20 1056     Vitamin B-12 327 pg/mL     Narrative:       Results may be falsely increased if patient taking Biotin.      TSH [[id number]]  (Abnormal) Collected:  06/01/20 0322    Specimen:  Blood Updated:  06/01/20 0508     TSH 4.550 uIU/mL      Comment: TSH results may be falsely decreased if patient taking Biotin.       Basic Metabolic Panel [[id number]]  (Abnormal) Collected:  06/01/20 0322    Specimen:  Blood Updated:  06/01/20 0506     Glucose 86 mg/dL      BUN 29 mg/dL      Creatinine 0.56 mg/dL      Sodium 145 mmol/L      Potassium 2.9 mmol/L      Chloride 110 mmol/L      CO2 22.0 mmol/L      Calcium 9.2 mg/dL      eGFR Non African Amer 104 mL/min/1.73      BUN/Creatinine Ratio 51.8     Anion Gap 13.0 mmol/L     Narrative:       GFR Normal >60  Chronic Kidney Disease <60  Kidney Failure <15      Magnesium [[id number]]  (Normal) Collected:  06/01/20 0322    Specimen:  Blood Updated:  06/01/20 0506     Magnesium 2.1 mg/dL     CBC Auto Differential [[id number]]  (Normal) Collected:  06/01/20 0322    Specimen:  Blood Updated:  06/01/20 0421     WBC 8.60 10*3/mm3      RBC 4.12 10*6/mm3      Hemoglobin 13.0 g/dL      Hematocrit 37.5 %      MCV 90.8 fL      MCH 31.5 pg      MCHC 34.7  g/dL      RDW 14.9 %      RDW-SD 47.7 fl      MPV 9.2 fL      Platelets 197 10*3/mm3      Neutrophil % 69.8 %      Lymphocyte % 23.0 %      Monocyte % 5.6 %      Eosinophil % 1.3 %      Basophil % 0.3 %      Neutrophils, Absolute 6.00 10*3/mm3      Lymphocytes, Absolute 2.00 10*3/mm3      Monocytes, Absolute 0.50 10*3/mm3      Eosinophils, Absolute 0.10 10*3/mm3      Basophils, Absolute 0.00 10*3/mm3      nRBC 0.0 /100 WBC     Ammonia [[id number]]  (Abnormal) Collected:  05/31/20 2000    Specimen:  Blood Updated:  05/31/20 2049     Ammonia <10 umol/L     Comprehensive Metabolic Panel [[id number]]  (Abnormal) Collected:  05/31/20 1527    Specimen:  Blood Updated:  05/31/20 1606     Glucose 101 mg/dL      BUN 37 mg/dL      Creatinine 0.69 mg/dL      Sodium 145 mmol/L      Potassium 2.9 mmol/L      Chloride 105 mmol/L      CO2 24.0 mmol/L      Calcium 9.7 mg/dL      Total Protein 7.3 g/dL      Albumin 4.20 g/dL      ALT (SGPT) 15 U/L      AST (SGOT) 26 U/L      Alkaline Phosphatase 73 U/L      Total Bilirubin 0.6 mg/dL      eGFR Non African Amer 82 mL/min/1.73      Globulin 3.1 gm/dL      A/G Ratio 1.4 g/dL      BUN/Creatinine Ratio 53.6     Anion Gap 16.0 mmol/L     Narrative:       GFR Normal >60  Chronic Kidney Disease <60  Kidney Failure <15      Troponin [[id number]]  (Normal) Collected:  05/31/20 1527    Specimen:  Blood Updated:  05/31/20 1606     Troponin T <0.010 ng/mL     Narrative:       Troponin T Reference Range:  <= 0.03 ng/mL-   Negative for AMI  >0.03 ng/mL-     Abnormal for myocardial necrosis.  Clinicians would have to utilize clinical acumen, EKG, Troponin and serial changes to determine if it is an Acute Myocardial Infarction or myocardial injury due to an underlying chronic condition.       Results may be falsely decreased if patient taking Biotin.      Lipase [[id number]]  (Normal) Collected:  05/31/20 1527    Specimen:  Blood Updated:  05/31/20 1606     Lipase 24 U/L     Urinalysis With Culture If  Indicated - Urine, Catheter [[id number]]  (Abnormal) Collected:  05/31/20 1521    Specimen:  Urine, Catheter Updated:  05/31/20 1544     Color, UA Dark Yellow     Appearance, UA Clear     pH, UA 6.0     Specific Gravity, UA 1.027     Glucose, UA Negative     Ketones, UA 15 mg/dL (1+)     Bilirubin, UA Negative     Blood, UA Negative     Protein, UA 30 mg/dL (1+)     Leuk Esterase, UA Negative     Nitrite, UA Negative     Urobilinogen, UA 1.0 E.U./dL    Urinalysis, Microscopic Only - Urine, Catheter [[id number]]  (Abnormal) Collected:  05/31/20 1521    Specimen:  Urine, Catheter Updated:  05/31/20 1544     RBC, UA 0-2 /HPF      WBC, UA 3-5 /HPF      Bacteria, UA None Seen /HPF      Squamous Epithelial Cells, UA 0-2 /HPF      Hyaline Casts, UA 3-6 /LPF      Methodology Automated Microscopy    CBC & Differential [[id number]] Collected:  05/31/20 1527    Specimen:  Blood Updated:  05/31/20 1537    Narrative:       The following orders were created for panel order CBC & Differential.  Procedure                               Abnormality         Status                     ---------                               -----------         ------                     CBC Auto Differential[[id number]]        Abnormal            Final result                 Please view results for these tests on the individual orders.    CBC Auto Differential [[id number]]  (Abnormal) Collected:  05/31/20 1527    Specimen:  Blood Updated:  05/31/20 1537     WBC 10.10 10*3/mm3      RBC 4.25 10*6/mm3      Hemoglobin 13.3 g/dL      Hematocrit 38.8 %      MCV 91.4 fL      MCH 31.2 pg      MCHC 34.1 g/dL      RDW 15.0 %      RDW-SD 48.1 fl      MPV 8.6 fL      Platelets 228 10*3/mm3      Neutrophil % 75.2 %      Lymphocyte % 18.1 %      Monocyte % 5.3 %      Eosinophil % 0.6 %      Basophil % 0.8 %      Neutrophils, Absolute 7.60 10*3/mm3      Lymphocytes, Absolute 1.80 10*3/mm3      Monocytes, Absolute 0.50 10*3/mm3      Eosinophils, Absolute 0.10 10*3/mm3       Basophils, Absolute 0.10 10*3/mm3      nRBC 0.1 /100 WBC               Imaging Results (All)     Procedure Component Value Units Date/Time    CT Abdomen Pelvis Without Contrast [[id number]] Collected:  05/31/20 1854     Updated:  05/31/20 2101    Narrative:       EXAM: CT OF THE ABDOMEN AND PELVIS WITHOUT IV CONTRAST    INDICATIONS: Pain    TECHNIQUE: CT of the abdomen and pelvis was performed without the administration of intravenous or oral contrast. CT dose lowering techniques were used, to include: automated exposure control, adjustment for patient size, and / or use of iterative   reconstruction. Image quality is suboptimal due to patient motion.    COMPARISON: 5/3/2020    FINDINGS:  Bones: A T12 compression deformity is unchanged. Remote bilateral pubic rami fractures are also again noted.    Lung bases: Unremarkable    ABDOMEN:  Liver: Unremarkable in noncontrast appearance.    Gallbladder and Bile Ducts: Unremarkable CT appearance of the gallbladder. There is no intrahepatic or extrahepatic biliary ductal dilatation.    Spleen: Unremarkable in noncontrast appearance.    Pancreas: The pancreatic parenchyma is unremarkable. There is no pancreatic ductal dilatation.    Adrenals: Unremarkable without nodularity.    Kidneys: There is no hydronephrosis or nephrolithiasis. A fluid attenuation lesion arising from the left kidney likely represents a cyst.    Vasculature: There are atherosclerotic calcifications throughout the abdomen and pelvis, without aneurysm.    Nodes: There is no lymphadenopathy by size criteria.    Bowel: There is no bowel obstruction. The rectum is distended with stool up to 8 cm in diameter. Perirectal edema is present. A large colonic stool burden is present. The appendix is not identified, however there are no inflammatory changes at the cecal   base.     Mesentery/Peritoneum: Unremarkable    Soft Tissues: Unremarkable    PELVIS:  Pelvic Organs: There is no significant interval change in a  6.4 cm cystic lesion along the left pelvic sidewall (series 2, image 84). Status post hysterectomy. The urinary bladder is unremarkable.        Impression:       1. A large colonic stool burden is present, including the rectum which is distended up to 8 cm in diameter. Perirectal edema is present. Findings are consistent with stercoral colitis.  2. No change in a 6.4 cm cystic lesion along the left pelvic sidewall.  3. Unchanged T12 and pubic rami fractures.  4. Additional chronic findings as above.    Electronically signed by:  Blake Wren M.D.    5/31/2020 7:00 PM    XR Chest 1 View [[id number]] Collected:  05/31/20 1603     Updated:  05/31/20 1606    Narrative:       DATE OF EXAM:  5/31/2020 3:30 PM     PROCEDURE:  XR CHEST 1 VW-     INDICATIONS:  Acute mental status changes, dementia, weakness, hypertension.      COMPARISON:  05/03/2020.     TECHNIQUE:   Single radiographic view of the chest was obtained.     FINDINGS:  The heart size is normal. The pulmonary vascular markings are normal.  There is mild linear scarring in the right midlung zone. The remaining  lungs and pleural spaces are clear. There are mild chronic age-related  changes involving the bony thorax and thoracic aorta.       Impression:       No active disease, stable chest x-ray.     Electronically Signed By-Russell Powell On:5/31/2020 4:04 PM  This report was finalized on [id number] by  Russell Powell, .                Discharge Disposition:  Home or Self Care    Discharge Medications:     Discharge Medications      New Medications      Instructions Start Date   cyanocobalamin 1000 MCG/ML injection   1,000 mcg, Intramuscular, Every 28 Days   Start Date:  June 29, 2020     polyethylene glycol powder powder  Commonly known as:  MIRALAX   17 g, Oral, Daily         Continue These Medications      Instructions Start Date   amLODIPine 10 MG tablet  Commonly known as:  NORVASC   10 mg, Oral, Daily      aspirin 81 MG chewable tablet   81 mg, Oral, Daily       Calcium 600+D 600-800 MG-UNIT tablet  Generic drug:  calcium carb-cholecalciferol   1 tablet, Oral, Daily      citalopram 20 MG tablet  Commonly known as:  CeleXA   20 mg, Oral, Daily      donepezil 10 MG tablet  Commonly known as:  ARICEPT   10 mg, Oral, Nightly      meloxicam 15 MG tablet  Commonly known as:  MOBIC   15 mg, Oral, Daily      omeprazole 20 MG capsule  Commonly known as:  priLOSEC   20 mg, Oral, Daily      pravastatin 80 MG tablet  Commonly known as:  PRAVACHOL   80 mg, Oral, Daily             Discharge Diet:   Diet Instructions     Diet: Dysphagia; Thin Liquids, No Restrictions; Pureed      Discharge Diet:  Dysphagia    Fluid Consistency:  Thin Liquids, No Restrictions    Pureed Options:  Pureed          Activity at Discharge:   Activity Instructions     Activity as Tolerated            Follow-up Appointments:  No future appointments.      Test Results Pending at Discharge:  Stable     Condition on Discharge:    none     Risk for Readmission (LACE): Score: 8 (6/3/2020  6:00 AM)          Vaughn Hewitt DO  06/03/20  16:44    Time: Discharge 31 min with face-to-face history/exam, writing all prescriptions, and documenting discharge data including care coordination

## 2020-06-03 NOTE — THERAPY TREATMENT NOTE
Acute Care - Speech Language Pathology   Swallow Treatment Note  [facility]     Patient Name: Taylor Price  : 1940  MRN: [id number]  Today's Date: 6/3/2020               Admit Date: 2020    Visit Dx:      ICD-10-CM ICD-9-CM   1. Weakness R53.1 780.79   2. Hypokalemia E87.6 276.8     Patient Active Problem List   Diagnosis   • Nondisplaced zone i fracture of sacrum, initial encounter for closed fracture (CMS/[facility], located within [facility])   • Hyperlipidemia   • Hypertension   • Dementia of the Alzheimer's type with late onset without behavioral disturbance (CMS/[facility], located within [facility])   • Osteoarthritis   • Depression   • Age-related physical debility   • Delirium   • GERD (gastroesophageal reflux disease)   • Alzheimer disease (CMS/HCC)   • Weakness   • Hypernatremia   • Malnutrition (CMS/HCC)   • UTI (urinary tract infection), bacterial       Therapy Treatment  Rehabilitation Treatment Summary     Row Name 20 1300          Discipline  speech language pathologist  -EC    Document Type  therapy note (daily note)  -EC    Subjective Information  no complaints Pt appears pleasantly confused  -EC    Care Plan Review  evaluation/treatment results reviewed;care plan/treatment goals reviewed  -EC    Patient Effort  good  -EC    Patient Response to Treatment  Pt seen for skilled dysphagia therapy on this date. Pt appears pleasantly confused; pt does not respond when asked name. Pt amenable to assessment of puree diet and thin liquids. Pt fed by clinician. Pt consumes pudding and thin water by straw, commenting that she likes the pudding. Oral transit appears WFL for puree and thin liquids w/no sig oral residue noted. No clinical s/s of aspiration demonstrated w/puree or thin liquid by straw. Recommend continue current diet, full feed.  -EC    Recorded by [EC] Katy New 20 1401    Row Name 20 1300             Outcome Summary/Treatment Plan (SLP)    Plan for Continued Treatment (SLP)  Recommend continue current puree diet w/thin  liquids, full feed  -EC      Recorded by [EC] Katy New 06/03/20 1401        User Key  (r) = Recorded By, (t) = Taken By, (c) = Cosigned By    Initials Name Effective Dates Discipline    EC Katy New 03/01/19 -  SLP          Outcome Summary  Outcome Summary/Treatment Plan (SLP)  Plan for Continued Treatment (SLP): Recommend continue current puree diet w/thin liquids, full feed (06/03/20 1300 : Katy New)      SLP GOALS     Row Name 06/03/20 1400 06/02/20 1300 06/01/20 1000       Oral Nutrition/Hydration Goal 1 (SLP)    Oral Nutrition/Hydration Goal 1, SLP  --  Patient will be seen at a meal within 24-48 hours to assess tolerance of current diet with further recommendations to be given as indicated  -MC  Patient will be seen at a meal within 24-48 hours to assess tolerance of current diet with further recommendations to be given as indicated  -MM    Time Frame (Oral Nutrition/Hydration Goal 1, SLP)  --  --  by discharge  -MM    Barriers (Oral Nutrition/Hydration Goal 1, SLP)  --  Pt with significant distraction and reduced attention consistent with Alzheimer's. This was observed to serve as a barrier for pt ability to self feed in a sufficient & timely manner to maintain nutrition. Pt required max cues & encouragement for PO intake.  She consumed tea  by straw, ground turkey, & mashed potatoes as fed by SLP.     -MC  --       Oral Nutrition/Hydration Goal 2 (SLP)    Oral Nutrition/Hydration Goal 2, SLP  Patient will tolerate safest and least restrictive diet with no complications from aspiration  -EC  Patient will tolerate safest and least restrictive diet with no complications from aspiration  -MC  Patient will tolerate safest and least restrictive diet with no complications from aspiration  -MM    Time Frame (Oral Nutrition/Hydration Goal 2, SLP)  --  --  by discharge  -MM    Barriers (Oral Nutrition/Hydration Goal 2, SLP)  pt tolerating puree diet, see above note  -EC  Oral dysphagia  evidenced with ground meat & to a lesser extent puree. Pt demonstrates pocketing, latency & interjections in oral manipulation/mastication. Pt clears oral cavity of mashed potatoes independently following a prolonged period of time & encouragement. Pt required multiple prompted liquid washes to clear visible oral cavity of stasis however it should be noted that visualization of oral cavity was limited. At this time, recommend pt's diet downgraded to puree, continue thin liquids. Pt should be a full feed with liquid washes & oral cavity inspection in between bites to ensure oral cavity clearance. Diet changes & feeding precautions were duscussed with the pt's significant other & nursing aid. Unable to locate pt's nurse, NA to relay information.   -MC  --    Progress/Outcomes (Oral Nutrition/Hydration Goal 2, SLP)  good progress toward goal  -EC  --  --      User Key  (r) = Recorded By, (t) = Taken By, (c) = Cosigned By    Initials Name Provider Type    Katy Moreira Speech and Language Pathologist    Julita Gray, SLP Speech and Language Pathologist    Adelina Silva, SLP Speech and Language Pathologist          EDUCATION  The patient has been educated in the following areas:   Dysphagia (Swallowing Impairment).    SLP Recommendation and Plan        Plan for Continued Treatment (SLP): Recommend continue current puree diet w/thin liquids, full feed                       Time Calculation:                  Ktay New  6/3/2020

## 2020-06-03 NOTE — PLAN OF CARE
Problem: Patient Care Overview  Goal: Plan of Care Review  Outcome: Ongoing (interventions implemented as appropriate)  Flowsheets (Taken 6/3/2020 7904)  Outcome Summary: pt remains only alert to self. Pt did not want to eat dinner. Will continue to monitor pt

## 2020-06-04 ENCOUNTER — READMISSION MANAGEMENT (OUTPATIENT)
Dept: CALL CENTER | Facility: HOSPITAL | Age: 80
End: 2020-06-04

## 2020-06-04 NOTE — PROGRESS NOTES
Discharge Planning Assessment   [facility]     Patient Name: Taylor Price  MRN: [id number]  Today's Date: 6/4/2020    Admit Date: 5/31/2020          Plan    Final Discharge Disposition Code  06 - home with home health care    Final Note  home with a home health       Carol naegele rn  Case management  Office number [phone]  Cell phone [phone]

## 2020-06-04 NOTE — OUTREACH NOTE
Prep Survey      Responses   [facility] facility patient discharged from?  [facility]   Is LACE score < 7 ?  No   Eligibility  Readm Mgmt   Discharge diagnosis  Weakness/Malnutrition   COVID-19 Test Status  Negative   Does the patient have one of the following disease processes/diagnoses(primary or secondary)?  Other   Does the patient have Home health ordered?  Yes   What is the Home health agency?   [facility]   Is there a DME ordered?  No   Prep survey completed?  Yes          Veronique Lima RN

## 2020-06-05 ENCOUNTER — READMISSION MANAGEMENT (OUTPATIENT)
Dept: CALL CENTER | Facility: HOSPITAL | Age: 80
End: 2020-06-05

## 2020-06-05 NOTE — OUTREACH NOTE
Medical Week 1 Survey      Responses   [facility] patient discharged from?  [facility]   COVID-19 Test Status  Negative   Does the patient have one of the following disease processes/diagnoses(primary or secondary)?  Other   Is there a successful TCM telephone encounter documented?  No   Week 1 attempt successful?  Yes   Call start time  1459   Call end time  1501   Is patient permission given to speak with other caregiver?  Yes   List who call center can speak with  ANDRESSA WILBURN   Person spoke with today (if not patient) and relationship  ANDRESSA PASTORT- SPOUSE   Meds reviewed with patient/caregiver?  Yes   Is the patient having any side effects they believe may be caused by any medication additions or changes?  No   Does the patient have all medications ordered at discharge?  Yes   Is the patient taking all medications as directed (includes completed medication regime)?  Yes   Does the patient have a primary care provider?   Yes   Does the patient have an appointment with their PCP within 7 days of discharge?  Yes   Comments regarding PCP   STATES SHE HAS A FOLLOW UP APPOINTMENT WITH HER PCP SCHEDULED   Has the patient kept scheduled appointments due by today?  N/A   What is the Home health agency?   [facility]   Has home health visited the patient within 72 hours of discharge?  Yes   Pulse Ox monitoring  None   Did the patient receive a copy of their discharge instructions?  Yes   Nursing interventions  Reviewed instructions with patient   What is the patient's perception of their health status since discharge?  Improving   Is the patient/caregiver able to teach back signs and symptoms related to disease process for when to call PCP?  Yes   Is the patient/caregiver able to teach back signs and symptoms related to disease process for when to call 911?  Yes   Is the patient/caregiver able to teach back the hierarchy of who to call/visit for symptoms/problems? PCP, Specialist, Home health nurse, Urgent Care,  ED, 911  Yes   Week 1 call completed?  Yes          Angeles Mendoza LPN

## 2020-06-10 ENCOUNTER — READMISSION MANAGEMENT (OUTPATIENT)
Dept: CALL CENTER | Facility: HOSPITAL | Age: 80
End: 2020-06-10

## 2020-06-10 NOTE — OUTREACH NOTE
Medical Week 2 Survey      Responses   [facility] patient discharged from?  [facility]   COVID-19 Test Status  Negative   Does the patient have one of the following disease processes/diagnoses(primary or secondary)?  Other   Week 2 attempt successful?  Yes   Call start time  1537   Discharge diagnosis  Weakness/Malnutrition   Call end time  1540   Is patient permission given to speak with other caregiver?  Yes   List who call center can speak with  ANDRESSA COSMO   Person spoke with today (if not patient) and relationship  ANDRESSA COSMO- SPOUSE   Meds reviewed with patient/caregiver?  Yes   Is the patient having any side effects they believe may be caused by any medication additions or changes?  No   Does the patient have all medications ordered at discharge?  Yes   Is the patient taking all medications as directed (includes completed medication regime)?  Yes   Does the patient have a primary care provider?   Yes   Does the patient have an appointment with their PCP within 7 days of discharge?  Greater than 7 days   Has the patient kept scheduled appointments due by today?  N/A   What is the Home health agency?   [facility]   Has home health visited the patient within 72 hours of discharge?  Yes   Pulse Ox monitoring  None   Did the patient receive a copy of their discharge instructions?  Yes   Nursing interventions  Reviewed instructions with patient   What is the patient's perception of their health status since discharge?  Returned to baseline/stable   Is the patient/caregiver able to teach back signs and symptoms related to disease process for when to call PCP?  Yes   Is the patient/caregiver able to teach back signs and symptoms related to disease process for when to call 911?  Yes   Is the patient/caregiver able to teach back the hierarchy of who to call/visit for symptoms/problems? PCP, Specialist, Home health nurse, Urgent Care, ED, 911  Yes   Additional teach back comments   states she is doing ok.   She will see her PCP next week.   Week 2 Call Completed?  Yes   Wrap up additional comments  No questions or needs at this time          Angeles Rivera LPN

## 2020-06-16 ENCOUNTER — READMISSION MANAGEMENT (OUTPATIENT)
Dept: CALL CENTER | Facility: HOSPITAL | Age: 80
End: 2020-06-16

## 2020-06-16 NOTE — OUTREACH NOTE
Medical Week 3 Survey      Responses   [facility] patient discharged from?  [facility]   COVID-19 Test Status  Negative   Does the patient have one of the following disease processes/diagnoses(primary or secondary)?  Other   Week 3 attempt successful?  Yes   Call start time  1803   Call end time  1804   Discharge diagnosis  Weakness/Malnutrition   Is patient permission given to speak with other caregiver?  Yes   List who call center can speak with  ANDRESSA PASTORT   Person spoke with today (if not patient) and relationship  ANDRESSA PASTORT- SPOUSE   What is the patient's perception of their health status since discharge?  Improving   Is the patient/caregiver able to teach back signs and symptoms related to disease process for when to call PCP?  Yes   Is the patient/caregiver able to teach back signs and symptoms related to disease process for when to call 911?  Yes   Is the patient/caregiver able to teach back the hierarchy of who to call/visit for symptoms/problems? PCP, Specialist, Home health nurse, Urgent Care, ED, 911  Yes   Additional teach back comments  Call was brief and states she is doing ok.     Week 3 Call Completed?  Yes   Graduated  Yes   Did the patient feel the follow up calls were helpful during their recovery period?  Yes   Was the number of calls appropriate?  Yes   Graduated/Revoked comments  No questions or needs at  this time          Angeles Rivera LPN

## 2020-10-08 NOTE — PROGRESS NOTES
[facility] Medicine Services Daily Progress Note      Hospitalist Team  LOS 0 days      Patient Care Team:  Camelia Miller APRN as PCP - General    Patient Location: 363/1      Subjective   Subjective     Chief Complaint / Subjective  Chief Complaint   Patient presents with   • Weakness - Generalized         Brief Synopsis of Hospital Course/HPI  female presents to [facility] ER on 5/31/2020 complaining of generalized weakness for the past 3 days.  It was reported by family that patient has had altered mental status that is worsened over the past few days and generally weak.  The  reports that this happened about 3 weeks ago and she was found to have a UTI.  Patient is a poor historian and unable to answer questions or follow verbal commands.  Patient has a history of Alzheimer's dementia.  It was reported by the  that patient also has decreased appetite but denies any history of fever, coughing, nausea, vomiting, diarrhea.  I have  personally attempted to call family but unsuccessful.     Patient was admitted to [facility] on 5/3/2020 for UTI, weakness, decreased oral intake and hypokalemia.  Patient diagnosed with debility, increased weakness with failure to thrive likely related to worsening Alzheimer's dementia.     In the ED, initial troponin negative, potassium low at 2.9, anion gap of 16.0, serum creatinine 0.69 and otherwise unremarkable CMP.  Lipase normal at 24.  CBC unremarkable.  UA showed 1+ ketones and 1+ protein and 3-5 WBCs but otherwise unremarkable.  Chest x-ray showed no active disease.  Patient is afebrile, pulse in the 60s, on room air oxygen at 97% SPO2 and blood pressure 140s over 60s.  Patient given 500 mL normal saline bolus in ED.        6/1/2020  - PT had a large bowel movement. Remains somewhat confused.  reports she is bowel baseline.     6/2/2020  - Patient had a bowel movement yesterday afternoon. Nursing reports no  "acute overnight event.      Review of Systems   Unable to perform ROS: dementia         Objective   Objective      Vital Signs  Temp:  [97.5 °F (36.4 °C)-98.9 °F (37.2 °C)] 97.8 °F (36.6 °C)  Heart Rate:  [60-75] 60  Resp:  [15-16] 15  BP: (120-149)/(72-83) 149/72  Oxygen Therapy  SpO2: 99 %  Pulse Oximetry Type: Intermittent  Device (Oxygen Therapy): room air  Flowsheet Rows      First Filed Value   Admission Height  157.5 cm (62\") Documented at 05/31/2020 1452   Admission Weight  48 kg (105 lb 13.1 oz) Documented at 05/31/2020 1452        Intake & Output (last 3 days)       05/30 0701 - 05/31 0700 05/31 0701 - 06/01 0700 06/01 0701 - 06/02 0700 06/02 0701 - 06/03 0700    P.O.   120 360    I.V. (mL/kg)   970 (20.4)     Total Intake(mL/kg)   1090 (22.9) 360 (7.6)    Net   +1090 +360            Urine Unmeasured Occurrence   2 x 1 x        Lines, Drains & Airways    Active LDAs     Name:   Placement date:   Placement time:   Site:   Days:    Peripheral IV 05/31/20 1526 Left Antecubital   05/31/20    1526    Antecubital   1                  Physical Exam:    Physical Exam   Constitutional: She appears well-developed and well-nourished. No distress.   HENT:   Head: Normocephalic and atraumatic.   Mouth/Throat: Oropharynx is clear and moist.   Eyes: Pupils are equal, round, and reactive to light. No scleral icterus.   Cardiovascular: Normal rate and regular rhythm.   No murmur heard.  Pulmonary/Chest: Effort normal and breath sounds normal. No respiratory distress. She has no wheezes.   Abdominal: Soft. Bowel sounds are normal. There is no rebound and no guarding.   Mildly ttp    Neurological: She is alert.   Oriented to self only   Skin: Skin is warm and dry. No rash noted.   Psychiatric: Cognition and memory are impaired.   Vitals reviewed.            Procedures:              Results Review:     I reviewed the patient's new clinical results.      Lab Results (last 24 hours)     Procedure Component Value Units Date/Time "    Basic Metabolic Panel [[id number]]  (Abnormal) Collected:  06/02/20 0828    Specimen:  Blood Updated:  06/02/20 1010     Glucose 122 mg/dL      BUN 17 mg/dL      Creatinine 0.49 mg/dL      Sodium 143 mmol/L      Potassium 3.7 mmol/L      Chloride 109 mmol/L      CO2 23.0 mmol/L      Calcium 9.3 mg/dL      eGFR Non African Amer 122 mL/min/1.73      BUN/Creatinine Ratio 34.7     Anion Gap 11.0 mmol/L     Narrative:       GFR Normal >60  Chronic Kidney Disease <60  Kidney Failure <15      CBC & Differential [[id number]] Collected:  06/02/20 0828    Specimen:  Blood Updated:  06/02/20 1006    Narrative:       The following orders were created for panel order CBC & Differential.  Procedure                               Abnormality         Status                     ---------                               -----------         ------                     CBC Auto Differential[[id number]]        Normal              Final result                 Please view results for these tests on the individual orders.    CBC Auto Differential [[id number]]  (Normal) Collected:  06/02/20 0828    Specimen:  Blood Updated:  06/02/20 1006     WBC 7.50 10*3/mm3      RBC 4.41 10*6/mm3      Hemoglobin 13.3 g/dL      Hematocrit 40.6 %      MCV 92.2 fL      MCH 30.3 pg      MCHC 32.8 g/dL      RDW 15.1 %      RDW-SD 49.0 fl      MPV 9.4 fL      Platelets 237 10*3/mm3      Neutrophil % 64.0 %      Lymphocyte % 27.9 %      Monocyte % 5.0 %      Eosinophil % 2.7 %      Basophil % 0.4 %      Neutrophils, Absolute 4.80 10*3/mm3      Lymphocytes, Absolute 2.10 10*3/mm3      Monocytes, Absolute 0.40 10*3/mm3      Eosinophils, Absolute 0.20 10*3/mm3      Basophils, Absolute 0.00 10*3/mm3      nRBC 0.0 /100 WBC     Potassium [[id number]]  (Normal) Collected:  06/01/20 1955    Specimen:  Blood Updated:  06/01/20 2100     Potassium 4.4 mmol/L         Hemoglobin A1C   Date Value Ref Range Status   06/01/2020 5.0 3.5 - 5.6 % Final               Lab Results      Component Value Date    LIPASE 24 05/31/2020     Lab Results   Component Value Date    CHOL 283 (H) 05/04/2020    CHLPL 209 (H) 09/27/2019    TRIG 132 05/04/2020    HDL 57 05/04/2020     (H) 05/04/2020       No results found for: INTRAOP, PREDX, FINALDX, COMDX    Microbiology Results (last 10 days)     ** No results found for the last 240 hours. **          ECG/EMG Results (most recent)     None                    Ct Abdomen Pelvis Without Contrast    Result Date: 5/31/2020  1. A large colonic stool burden is present, including the rectum which is distended up to 8 cm in diameter. Perirectal edema is present. Findings are consistent with stercoral colitis. 2. No change in a 6.4 cm cystic lesion along the left pelvic sidewall. 3. Unchanged T12 and pubic rami fractures. 4. Additional chronic findings as above. Electronically signed by:  Blake Wren M.D.  5/31/2020 7:00 PM    Xr Chest 1 View    Result Date: 5/31/2020  No active disease, stable chest x-ray.  Electronically Signed By-Russell Powell On:5/31/2020 4:04 PM This report was finalized on [id number] by  Russell Powell, .          Xrays, labs reviewed personally by physician.    Medication Review:   I have reviewed the patient's current medication list      Scheduled Meds    amLODIPine 10 mg Oral Daily   aspirin 81 mg Oral Daily   atorvastatin 20 mg Oral Daily   calcium 500 mg vitamin D 5 mcg (200 UT) 1 tablet Oral Daily   citalopram 20 mg Oral Daily   cyanocobalamin 1,000 mcg Intramuscular Q28 Days   donepezil 10 mg Oral Nightly   hydrocortisone 25 mg Rectal BID   meloxicam 15 mg Oral Daily   mineral oil 1 enema Rectal Once   pantoprazole 40 mg Oral QAM   polyethylene glycol 17 g Oral Daily   sodium chloride 10 mL Intravenous Q12H       Meds Infusions    lactated ringers 75 mL/hr Last Rate: 75 mL/hr (06/02/20 0533)       Meds PRN  •  acetaminophen **OR** acetaminophen **OR** acetaminophen  •  aluminum-magnesium hydroxide-simethicone  •  bisacodyl  •   magnesium hydroxide  •  magnesium sulfate **OR** magnesium sulfate in D5W 1g/100mL (PREMIX)  •  melatonin  •  nitroglycerin  •  ondansetron **OR** ondansetron  •  potassium chloride  •  [COMPLETED] Insert peripheral IV **AND** sodium chloride  •  sodium chloride    I personally reviewed patient's x-ray films and my findings are     I personally reviewed patient's EKG strips and my findings are     Assessment/Plan   Assessment/Plan     Active Hospital Problems    Diagnosis  POA   • **Weakness [R53.1]  Yes   • Abdominal pain [R10.9]  Yes   • Malnutrition (CMS/HCC) [E46]  Yes   • Hypokalemia [E87.6]  Yes   • GERD (gastroesophageal reflux disease) [K21.9]  Yes   • Hyperlipidemia [E78.5]  Yes   • Dementia of the Alzheimer's type with late onset without behavioral disturbance (CMS/HCC) [G30.1, F02.80]  Yes   • Depression [F32.9]  Yes   • Hypertension [I10]  Yes      Resolved Hospital Problems   No resolved problems to display.       MEDICAL DECISION MAKING COMPLEXITY BY PROBLEM:     Altered mental status with generalized weakness and decreased oral intake  - likely multifactorial  - initial troponin negative, potassium low at 2.9,  - Chest x-ray showed no active disease.  - UA negative   - continue IV fluids  - B12 327, will add supplement  - TSH 4.55  - Nutrition consult  - decrease IV fluids to 75cc/hr   - Pt/OT/CM    Abdominal pain likely d/t constipation and fecal impaction   - CT abd/pel reviewed   - mineral oil enema  - pt had a bowel movement 6/1/20  - continue miralax daily      Hypokalemia- improving  - potassium replacement protocol   - magnesium 2.1      Alzheimer's dementia  - Reported baseline patient is more aware  - Continue Aricept     Severe Malnutrition   - Dietary consult  - BMI continues to decrease over the past 6 months     Essential hypertension, poorly controlled  - Continue home Norvasc     Depression  - Continue home Celexa     GERD  - Continue home PPI     Hyperlipidemia  - Continue home  statin     VTE Prophylaxis   - SCDs    Dispo- pt  refusing inpatient rehab, home with home health once tolerating more po hopefully tomorrow       VTE Prophylaxis -   Mechanical Order History:      Ordered        05/31/20 1903  Place Sequential Compression Device  Once         05/31/20 1903  Maintain Sequential Compression Device  Continuous                 Pharmalogical Order History:     None            Code Status -   Code Status and Medical Interventions:   Ordered at: 05/31/20 1817     Code Status:    CPR     Medical Interventions (Level of Support Prior to Arrest):    Full           Discharge Planning          Destination      Coordination has not been started for this encounter.      Durable Medical Equipment      Coordination has not been started for this encounter.      Dialysis/Infusion      Coordination has not been started for this encounter.      Home Medical Care      Service Provider Request Status Selected Services Address Phone Number Fax Number    [facility] Accepted N/A [address] [place] [id number] [phone]      Therapy      Coordination has not been started for this encounter.      Community Resources      Coordination has not been started for this encounter.            Electronically signed by Vaughn Hewitt DO, 06/02/20, 15:32.  [facility] Hospitalist Team         [Healthy Appearing] : healthy appearing [Well Nourished] : well nourished [Interactive] : interactive [Normal Appearance] : normal appearance [Well formed] : well formed [WNL for age] : within normal limits of age [Normal S1 and S2] : normal S1 and S2 [Clear to Ausculation Bilaterally] : clear to auscultation bilaterally [Abdomen Soft] : soft [Eddie Stage ___] : the Eddie stage for breast development was [unfilled] [Normal] : normal  [Goiter] : no goiter [de-identified] : +micrognathia, mildly webbed neck [de-identified] : small nevi on the face  [de-identified] : wears glasses [FreeTextEntry1] : late lucía 3